# Patient Record
Sex: FEMALE | Race: WHITE | NOT HISPANIC OR LATINO | Employment: OTHER | URBAN - METROPOLITAN AREA
[De-identification: names, ages, dates, MRNs, and addresses within clinical notes are randomized per-mention and may not be internally consistent; named-entity substitution may affect disease eponyms.]

---

## 2023-07-10 LAB
ALANINE AMINOTRANSFERASE (SGPT) (U/L) IN SER/PLAS: 13 U/L (ref 7–45)
ALBUMIN (G/DL) IN SER/PLAS: 4.4 G/DL (ref 3.4–5)
ALKALINE PHOSPHATASE (U/L) IN SER/PLAS: 65 U/L (ref 33–136)
ANION GAP IN SER/PLAS: 12 MMOL/L (ref 10–20)
ASPARTATE AMINOTRANSFERASE (SGOT) (U/L) IN SER/PLAS: 20 U/L (ref 9–39)
BILIRUBIN TOTAL (MG/DL) IN SER/PLAS: 0.7 MG/DL (ref 0–1.2)
CALCIUM (MG/DL) IN SER/PLAS: 9.2 MG/DL (ref 8.6–10.6)
CARBON DIOXIDE, TOTAL (MMOL/L) IN SER/PLAS: 28 MMOL/L (ref 21–32)
CHLORIDE (MMOL/L) IN SER/PLAS: 102 MMOL/L (ref 98–107)
CHOLESTEROL (MG/DL) IN SER/PLAS: 172 MG/DL (ref 0–199)
CHOLESTEROL IN HDL (MG/DL) IN SER/PLAS: 56.5 MG/DL
CHOLESTEROL/HDL RATIO: 3
CREATININE (MG/DL) IN SER/PLAS: 0.6 MG/DL (ref 0.5–1.05)
GFR FEMALE: 89 ML/MIN/1.73M2
GLUCOSE (MG/DL) IN SER/PLAS: 84 MG/DL (ref 74–99)
LDL: 79 MG/DL (ref 0–99)
POTASSIUM (MMOL/L) IN SER/PLAS: 4.7 MMOL/L (ref 3.5–5.3)
PROTEIN TOTAL: 7.2 G/DL (ref 6.4–8.2)
SODIUM (MMOL/L) IN SER/PLAS: 137 MMOL/L (ref 136–145)
THYROTROPIN (MIU/L) IN SER/PLAS BY DETECTION LIMIT <= 0.05 MIU/L: 1.32 MIU/L (ref 0.44–3.98)
THYROXINE (T4) FREE (NG/DL) IN SER/PLAS: 1.36 NG/DL (ref 0.78–1.48)
TRIGLYCERIDE (MG/DL) IN SER/PLAS: 184 MG/DL (ref 0–149)
TRIIODOTHYRONINE (T3) FREE (PG/ML) IN SER/PLAS: 3.2 PG/ML (ref 2.3–4.2)
UREA NITROGEN (MG/DL) IN SER/PLAS: 16 MG/DL (ref 6–23)
VLDL: 37 MG/DL (ref 0–40)

## 2024-01-15 ENCOUNTER — LAB (OUTPATIENT)
Dept: LAB | Facility: LAB | Age: 84
End: 2024-01-15
Payer: MEDICARE

## 2024-01-15 ENCOUNTER — OFFICE VISIT (OUTPATIENT)
Dept: PRIMARY CARE | Facility: CLINIC | Age: 84
End: 2024-01-15
Payer: MEDICARE

## 2024-01-15 ENCOUNTER — TELEPHONE (OUTPATIENT)
Dept: PRIMARY CARE | Facility: CLINIC | Age: 84
End: 2024-01-15

## 2024-01-15 VITALS
DIASTOLIC BLOOD PRESSURE: 84 MMHG | WEIGHT: 163 LBS | SYSTOLIC BLOOD PRESSURE: 128 MMHG | HEART RATE: 84 BPM | BODY MASS INDEX: 26.51 KG/M2 | OXYGEN SATURATION: 94 %

## 2024-01-15 DIAGNOSIS — Z00.00 HEALTHCARE MAINTENANCE: ICD-10-CM

## 2024-01-15 DIAGNOSIS — Z76.89 ENCOUNTER TO ESTABLISH CARE WITH NEW DOCTOR: ICD-10-CM

## 2024-01-15 DIAGNOSIS — E03.9 HYPOTHYROIDISM, UNSPECIFIED TYPE: ICD-10-CM

## 2024-01-15 DIAGNOSIS — E78.2 MIXED HYPERLIPIDEMIA: ICD-10-CM

## 2024-01-15 DIAGNOSIS — N32.81 OVERACTIVE BLADDER: ICD-10-CM

## 2024-01-15 DIAGNOSIS — E78.2 MIXED HYPERLIPIDEMIA: Primary | ICD-10-CM

## 2024-01-15 DIAGNOSIS — J01.90 ACUTE NON-RECURRENT SINUSITIS, UNSPECIFIED LOCATION: Primary | ICD-10-CM

## 2024-01-15 PROBLEM — R10.31 RIGHT LOWER QUADRANT PAIN: Status: ACTIVE | Noted: 2024-01-15

## 2024-01-15 PROBLEM — M25.551 RIGHT HIP PAIN: Status: ACTIVE | Noted: 2020-06-11

## 2024-01-15 PROBLEM — Z96.649 HISTORY OF HIP REPLACEMENT, TOTAL: Status: ACTIVE | Noted: 2024-01-15

## 2024-01-15 PROBLEM — R79.0 LOW MAGNESIUM LEVEL: Status: ACTIVE | Noted: 2020-05-06

## 2024-01-15 PROBLEM — E87.1 LOW SODIUM LEVELS: Status: ACTIVE | Noted: 2019-12-23

## 2024-01-15 PROBLEM — E53.8 VITAMIN B 12 DEFICIENCY: Status: ACTIVE | Noted: 2020-05-06

## 2024-01-15 PROBLEM — E55.9 VITAMIN D DEFICIENCY: Status: ACTIVE | Noted: 2020-05-06

## 2024-01-15 PROBLEM — B02.23 SHINGLES (HERPES ZOSTER) POLYNEUROPATHY: Status: ACTIVE | Noted: 2019-12-06

## 2024-01-15 LAB
CHOLEST SERPL-MCNC: 159 MG/DL (ref 0–199)
CHOLESTEROL/HDL RATIO: 3.5
HDLC SERPL-MCNC: 45.9 MG/DL
LDLC SERPL CALC-MCNC: 82 MG/DL
NON HDL CHOLESTEROL: 113 MG/DL (ref 0–149)
T3FREE SERPL-MCNC: 2.9 PG/ML (ref 2.3–4.2)
T4 FREE SERPL-MCNC: 1.49 NG/DL (ref 0.78–1.48)
TRIGL SERPL-MCNC: 158 MG/DL (ref 0–149)
TSH SERPL-ACNC: 3.58 MIU/L (ref 0.44–3.98)
VLDL: 32 MG/DL (ref 0–40)

## 2024-01-15 PROCEDURE — 84439 ASSAY OF FREE THYROXINE: CPT

## 2024-01-15 PROCEDURE — 84481 FREE ASSAY (FT-3): CPT

## 2024-01-15 PROCEDURE — 1159F MED LIST DOCD IN RCRD: CPT | Performed by: STUDENT IN AN ORGANIZED HEALTH CARE EDUCATION/TRAINING PROGRAM

## 2024-01-15 PROCEDURE — 84443 ASSAY THYROID STIM HORMONE: CPT

## 2024-01-15 PROCEDURE — 1036F TOBACCO NON-USER: CPT | Performed by: STUDENT IN AN ORGANIZED HEALTH CARE EDUCATION/TRAINING PROGRAM

## 2024-01-15 PROCEDURE — 99213 OFFICE O/P EST LOW 20 MIN: CPT | Performed by: STUDENT IN AN ORGANIZED HEALTH CARE EDUCATION/TRAINING PROGRAM

## 2024-01-15 PROCEDURE — 36415 COLL VENOUS BLD VENIPUNCTURE: CPT

## 2024-01-15 PROCEDURE — 80061 LIPID PANEL: CPT

## 2024-01-15 PROCEDURE — 99387 INIT PM E/M NEW PAT 65+ YRS: CPT | Performed by: STUDENT IN AN ORGANIZED HEALTH CARE EDUCATION/TRAINING PROGRAM

## 2024-01-15 PROCEDURE — 1126F AMNT PAIN NOTED NONE PRSNT: CPT | Performed by: STUDENT IN AN ORGANIZED HEALTH CARE EDUCATION/TRAINING PROGRAM

## 2024-01-15 RX ORDER — SIMVASTATIN 40 MG/1
40 TABLET, FILM COATED ORAL NIGHTLY
COMMUNITY
End: 2024-01-15 | Stop reason: SDUPTHER

## 2024-01-15 RX ORDER — SIMVASTATIN 40 MG/1
40 TABLET, FILM COATED ORAL NIGHTLY
Qty: 90 TABLET | Refills: 1 | Status: SHIPPED | OUTPATIENT
Start: 2024-01-15

## 2024-01-15 RX ORDER — LEVOTHYROXINE SODIUM 100 UG/1
100 TABLET ORAL
Qty: 90 TABLET | Refills: 1 | Status: SHIPPED | OUTPATIENT
Start: 2024-01-15

## 2024-01-15 RX ORDER — NITROGLYCERIN 20 MG/G
0.5 OINTMENT TOPICAL
COMMUNITY
Start: 2023-01-26 | End: 2024-01-15 | Stop reason: WASHOUT

## 2024-01-15 RX ORDER — DOXYCYCLINE 100 MG/1
100 CAPSULE ORAL 2 TIMES DAILY
Qty: 10 CAPSULE | Refills: 0 | Status: SHIPPED | OUTPATIENT
Start: 2024-01-15 | End: 2024-01-20

## 2024-01-15 RX ORDER — DESMOPRESSIN ACETATE 0.2 MG/1
0.2 TABLET ORAL DAILY
COMMUNITY

## 2024-01-15 RX ORDER — LEVOTHYROXINE SODIUM 100 UG/1
100 TABLET ORAL
COMMUNITY
End: 2024-01-15 | Stop reason: SDUPTHER

## 2024-01-15 RX ORDER — MELOXICAM 15 MG/1
15 TABLET ORAL DAILY
COMMUNITY
Start: 2024-01-02 | End: 2024-01-15 | Stop reason: WASHOUT

## 2024-01-15 RX ORDER — LATANOPROST 50 UG/ML
1 SOLUTION/ DROPS OPHTHALMIC DAILY
COMMUNITY
End: 2024-01-15 | Stop reason: WASHOUT

## 2024-01-15 ASSESSMENT — PAIN SCALES - GENERAL: PAINLEVEL: 0-NO PAIN

## 2024-01-15 ASSESSMENT — PATIENT HEALTH QUESTIONNAIRE - PHQ9
2. FEELING DOWN, DEPRESSED OR HOPELESS: NOT AT ALL
SUM OF ALL RESPONSES TO PHQ9 QUESTIONS 1 AND 2: 0
1. LITTLE INTEREST OR PLEASURE IN DOING THINGS: NOT AT ALL

## 2024-01-15 NOTE — PROGRESS NOTES
Subjective   Patient ID: Keisha Baxter is a 83 y.o. female who presents for Medicare Annual Wellness Visit Subsequent (Cough and congestion in nose and chest. ).    HPI  84 yo female here to establish care and for annual wellness and URI  Est care  2. Hypothyrroidism  On levothyroxine 100 mcg  3. HLD  On simvastatin 40 mg   4. Overactive bladder  Sees Dr Bergeron  On desmopressin .2mg daily  5. Sinusitis  Headache  Neg covid  Cough, nasal congestion  Symptoms for 11 days  6. HM  No longer gets Mammogram  No longer gets Pap smear  No longer getting Colonoscopy  Immunizations: UTD on pneumonia vaccine, shingles vaccine, covid  Well balanced diet  Exercises regularly  Sees dentist regularly  No hearing or vision changes  No tobacco use currently, 50 years of use 20 years ago  Occasional alcohol use    Review of Systems  All pertinent positive symptoms are included in the history of present illness.    All other systems have been reviewed and are negative and noncontributory to this patient's current ailments.     Allergies   Allergen Reactions    Nickel Itching and Rash     rash    Penicillins Hives and Unknown     hives    Shellfish Derived Hives        Immunization History   Administered Date(s) Administered    Pfizer Purple Cap SARS-CoV-2 01/29/2021, 02/27/2021, 10/05/2021       Objective   Vitals:    01/15/24 0951   BP: 128/84   BP Location: Right arm   Patient Position: Sitting   Pulse: 84   SpO2: 94%   Weight: 73.9 kg (163 lb)       Physical Exam  CONSTITUTIONAL - well nourished, well developed, looks like stated age, in no acute distress  SKIN - normal skin color and pigmentation  HEAD - no trauma, normocephalic  EYES - extraocular muscles are intact  ENT - atraumatic  NECK - no neck mass was observed  LUNGS - CTA B/L  CARDIAC - regular rate and regular rhythm  ABDOMEN - no organomegaly, soft, nontender, nondistended  EXTREMITIES - no edema, no deformities  MSK - moves limbs equally  NEUROLOGICAL - alert,  oriented and no focal signs  PSYCHIATRIC - alert, pleasant and cordial, age-appropriate    Assessment/Plan   84 yo female here to establish care and for annual wellness and URI  Est care  PMHx reviewed  2. Hypothryroidism  Continue levothyroxine 100 mcg  3. HLD  Continue simvastatin 40 mg   4. Overactive bladder  Continue seeing Dr Bergeron  Continue desmopressin .2mg daily  5. Sinusitis  Start doxycycline  6. HM  No longer gets Mammogram  No longer gets Pap smear  No longer getting Colonoscopy  Immunizations: UTD on pneumonia vaccine, shingles vaccine, covid  Lab work ordered    Follow up in 6 months

## 2024-07-15 ENCOUNTER — OFFICE VISIT (OUTPATIENT)
Dept: PRIMARY CARE | Facility: CLINIC | Age: 84
End: 2024-07-15
Payer: MEDICARE

## 2024-07-15 ENCOUNTER — LAB (OUTPATIENT)
Dept: LAB | Facility: LAB | Age: 84
End: 2024-07-15
Payer: MEDICARE

## 2024-07-15 VITALS — HEART RATE: 87 BPM | OXYGEN SATURATION: 98 % | SYSTOLIC BLOOD PRESSURE: 150 MMHG | DIASTOLIC BLOOD PRESSURE: 80 MMHG

## 2024-07-15 DIAGNOSIS — E78.2 MIXED HYPERLIPIDEMIA: Primary | ICD-10-CM

## 2024-07-15 DIAGNOSIS — E03.9 HYPOTHYROIDISM, UNSPECIFIED TYPE: ICD-10-CM

## 2024-07-15 DIAGNOSIS — E78.2 MIXED HYPERLIPIDEMIA: ICD-10-CM

## 2024-07-15 LAB
ALBUMIN SERPL BCP-MCNC: 4.6 G/DL (ref 3.4–5)
ALP SERPL-CCNC: 68 U/L (ref 33–136)
ALT SERPL W P-5'-P-CCNC: 16 U/L (ref 7–45)
ANION GAP SERPL CALC-SCNC: 12 MMOL/L (ref 10–20)
AST SERPL W P-5'-P-CCNC: 17 U/L (ref 9–39)
BILIRUB SERPL-MCNC: 0.8 MG/DL (ref 0–1.2)
BUN SERPL-MCNC: 15 MG/DL (ref 6–23)
CALCIUM SERPL-MCNC: 9.8 MG/DL (ref 8.6–10.6)
CHLORIDE SERPL-SCNC: 100 MMOL/L (ref 98–107)
CHOLEST SERPL-MCNC: 180 MG/DL (ref 0–199)
CHOLESTEROL/HDL RATIO: 3.2
CO2 SERPL-SCNC: 28 MMOL/L (ref 21–32)
CREAT SERPL-MCNC: 0.64 MG/DL (ref 0.5–1.05)
EGFRCR SERPLBLD CKD-EPI 2021: 87 ML/MIN/1.73M*2
GLUCOSE SERPL-MCNC: 88 MG/DL (ref 74–99)
HDLC SERPL-MCNC: 55.5 MG/DL
LDLC SERPL CALC-MCNC: 94 MG/DL
NON HDL CHOLESTEROL: 125 MG/DL (ref 0–149)
POTASSIUM SERPL-SCNC: 4.4 MMOL/L (ref 3.5–5.3)
PROT SERPL-MCNC: 7.3 G/DL (ref 6.4–8.2)
SODIUM SERPL-SCNC: 136 MMOL/L (ref 136–145)
TRIGL SERPL-MCNC: 154 MG/DL (ref 0–149)
TSH SERPL-ACNC: 2.74 MIU/L (ref 0.44–3.98)
VLDL: 31 MG/DL (ref 0–40)

## 2024-07-15 PROCEDURE — 99214 OFFICE O/P EST MOD 30 MIN: CPT | Performed by: STUDENT IN AN ORGANIZED HEALTH CARE EDUCATION/TRAINING PROGRAM

## 2024-07-15 PROCEDURE — 80061 LIPID PANEL: CPT

## 2024-07-15 PROCEDURE — 84443 ASSAY THYROID STIM HORMONE: CPT

## 2024-07-15 PROCEDURE — 1157F ADVNC CARE PLAN IN RCRD: CPT | Performed by: STUDENT IN AN ORGANIZED HEALTH CARE EDUCATION/TRAINING PROGRAM

## 2024-07-15 PROCEDURE — 1159F MED LIST DOCD IN RCRD: CPT | Performed by: STUDENT IN AN ORGANIZED HEALTH CARE EDUCATION/TRAINING PROGRAM

## 2024-07-15 PROCEDURE — 36415 COLL VENOUS BLD VENIPUNCTURE: CPT

## 2024-07-15 PROCEDURE — 1158F ADVNC CARE PLAN TLK DOCD: CPT | Performed by: STUDENT IN AN ORGANIZED HEALTH CARE EDUCATION/TRAINING PROGRAM

## 2024-07-15 PROCEDURE — 1123F ACP DISCUSS/DSCN MKR DOCD: CPT | Performed by: STUDENT IN AN ORGANIZED HEALTH CARE EDUCATION/TRAINING PROGRAM

## 2024-07-15 PROCEDURE — 1125F AMNT PAIN NOTED PAIN PRSNT: CPT | Performed by: STUDENT IN AN ORGANIZED HEALTH CARE EDUCATION/TRAINING PROGRAM

## 2024-07-15 PROCEDURE — 80053 COMPREHEN METABOLIC PANEL: CPT

## 2024-07-15 PROCEDURE — 1036F TOBACCO NON-USER: CPT | Performed by: STUDENT IN AN ORGANIZED HEALTH CARE EDUCATION/TRAINING PROGRAM

## 2024-07-15 RX ORDER — MELOXICAM 15 MG/1
1 TABLET ORAL
COMMUNITY
Start: 2024-06-12

## 2024-07-15 RX ORDER — ACETAMINOPHEN, DIPHENHYDRAMINE HCL, PHENYLEPHRINE HCL 325; 25; 5 MG/1; MG/1; MG/1
TABLET ORAL
COMMUNITY

## 2024-07-15 RX ORDER — ACETAMINOPHEN 500 MG
5000 TABLET ORAL
COMMUNITY

## 2024-07-15 ASSESSMENT — PAIN SCALES - GENERAL: PAINLEVEL: 10-WORST PAIN EVER

## 2024-07-15 ASSESSMENT — PATIENT HEALTH QUESTIONNAIRE - PHQ9
1. LITTLE INTEREST OR PLEASURE IN DOING THINGS: NOT AT ALL
SUM OF ALL RESPONSES TO PHQ9 QUESTIONS 1 AND 2: 0
2. FEELING DOWN, DEPRESSED OR HOPELESS: NOT AT ALL

## 2024-07-15 NOTE — PROGRESS NOTES
Subjective   Patient ID: Keisha Baxter is a 84 y.o. female who presents for Follow-up (6 months).    HPI  83 yo female here for 6 month follow up  Hypothyroidism  On levothyroxine 100 mcg  2. HLD  On simvastatin 40 mg   3. Overactive bladder  Seeing Dr Davidson  On desmopressin .2mg daily  4. R knee osteoporosis  Seeing ortho  Uses meloxicam prn    Review of Systems  All pertinent positive symptoms are included in the history of present illness.    All other systems have been reviewed and are negative and noncontributory to this patient's current ailments.     Allergies   Allergen Reactions    Nickel Itching and Rash     rash    Penicillins Hives and Unknown     hives    Shellfish Derived Hives        Immunization History   Administered Date(s) Administered    Pfizer Purple Cap SARS-CoV-2 01/29/2021, 02/27/2021, 10/05/2021       Objective   Vitals:    07/15/24 1024 07/15/24 1230   BP: (!) 148/94 150/80   Pulse: 87    SpO2: 98%        Physical Exam  CONSTITUTIONAL - well nourished, well developed, looks like stated age, in no acute distress  SKIN - normal skin color and pigmentation. No rash, lesions, or nodules visualized  HEAD - no trauma, normocephalic  EYES - extraocular muscles are intact  ENT - atraumatic  NECK - no neck mass was observed  LUNGS - CTA B/L  CARDIAC - regular rate and regular rhythm  ABDOMEN - no organomegaly, soft, nontender, nondistended  EXTREMITIES - no edema, no deformities  MSK - moves limbs equally  NEUROLOGICAL - alert, oriented and no focal signs  PSYCHIATRIC - alert, pleasant and cordial, age-appropriate  IMMUNOLOGIC - no cervical lymphadenopathy     Assessment/Plan   83 yo female here for 6 month follow up  Hypothyroidism  Continue levothyroxine 100 mcg  2. HLD  Continue simvastatin 40 mg   3. Overactive bladder  See Dr Davidson  Continue desmopressin .2mg daily  4. R knee osteoporosis  See ortho  Meloxicam prn    RTC in 6 months

## 2024-07-16 ENCOUNTER — TELEPHONE (OUTPATIENT)
Dept: PRIMARY CARE | Facility: CLINIC | Age: 84
End: 2024-07-16
Payer: MEDICARE

## 2024-07-16 DIAGNOSIS — E03.9 HYPOTHYROIDISM, UNSPECIFIED TYPE: ICD-10-CM

## 2024-07-16 DIAGNOSIS — E78.2 MIXED HYPERLIPIDEMIA: ICD-10-CM

## 2024-07-16 RX ORDER — SIMVASTATIN 40 MG/1
40 TABLET, FILM COATED ORAL NIGHTLY
Qty: 90 TABLET | Refills: 1 | Status: SHIPPED | OUTPATIENT
Start: 2024-07-16

## 2024-07-16 RX ORDER — LEVOTHYROXINE SODIUM 100 UG/1
100 TABLET ORAL
Qty: 90 TABLET | Refills: 1 | Status: SHIPPED | OUTPATIENT
Start: 2024-07-16

## 2024-10-01 ENCOUNTER — LAB (OUTPATIENT)
Dept: LAB | Facility: LAB | Age: 84
End: 2024-10-01
Payer: MEDICARE

## 2024-10-01 ENCOUNTER — PRE-ADMISSION TESTING (OUTPATIENT)
Dept: PREADMISSION TESTING | Facility: HOSPITAL | Age: 84
End: 2024-10-01
Payer: MEDICARE

## 2024-10-01 VITALS
HEIGHT: 66 IN | RESPIRATION RATE: 16 BRPM | HEART RATE: 90 BPM | WEIGHT: 162.6 LBS | SYSTOLIC BLOOD PRESSURE: 162 MMHG | BODY MASS INDEX: 26.13 KG/M2 | OXYGEN SATURATION: 98 % | TEMPERATURE: 98.6 F | DIASTOLIC BLOOD PRESSURE: 110 MMHG

## 2024-10-01 DIAGNOSIS — Z01.818 PRE-OP EXAMINATION: Primary | ICD-10-CM

## 2024-10-01 DIAGNOSIS — Z01.818 PRE-OP EXAMINATION: ICD-10-CM

## 2024-10-01 LAB
ATRIAL RATE: 79 BPM
BASOPHILS # BLD AUTO: 0.02 X10*3/UL (ref 0–0.1)
BASOPHILS NFR BLD AUTO: 0.4 %
EOSINOPHIL # BLD AUTO: 0.06 X10*3/UL (ref 0–0.4)
EOSINOPHIL NFR BLD AUTO: 1.1 %
ERYTHROCYTE [DISTWIDTH] IN BLOOD BY AUTOMATED COUNT: 12.5 % (ref 11.5–14.5)
HCT VFR BLD AUTO: 46.4 % (ref 36–46)
HGB BLD-MCNC: 15.2 G/DL (ref 12–16)
IMM GRANULOCYTES # BLD AUTO: 0.01 X10*3/UL (ref 0–0.5)
IMM GRANULOCYTES NFR BLD AUTO: 0.2 % (ref 0–0.9)
LYMPHOCYTES # BLD AUTO: 1.58 X10*3/UL (ref 0.8–3)
LYMPHOCYTES NFR BLD AUTO: 28.8 %
MCH RBC QN AUTO: 30.7 PG (ref 26–34)
MCHC RBC AUTO-ENTMCNC: 32.8 G/DL (ref 32–36)
MCV RBC AUTO: 94 FL (ref 80–100)
MONOCYTES # BLD AUTO: 0.79 X10*3/UL (ref 0.05–0.8)
MONOCYTES NFR BLD AUTO: 14.4 %
NEUTROPHILS # BLD AUTO: 3.03 X10*3/UL (ref 1.6–5.5)
NEUTROPHILS NFR BLD AUTO: 55.1 %
NRBC BLD-RTO: 0 /100 WBCS (ref 0–0)
P AXIS: 56 DEGREES
P OFFSET: 194 MS
P ONSET: 135 MS
PLATELET # BLD AUTO: 153 X10*3/UL (ref 150–450)
PR INTERVAL: 184 MS
Q ONSET: 227 MS
QRS COUNT: 13 BEATS
QRS DURATION: 114 MS
QT INTERVAL: 416 MS
QTC CALCULATION(BAZETT): 477 MS
QTC FREDERICIA: 456 MS
R AXIS: -43 DEGREES
RBC # BLD AUTO: 4.95 X10*6/UL (ref 4–5.2)
T AXIS: 75 DEGREES
T OFFSET: 435 MS
VENTRICULAR RATE: 79 BPM
WBC # BLD AUTO: 5.5 X10*3/UL (ref 4.4–11.3)

## 2024-10-01 PROCEDURE — 99203 OFFICE O/P NEW LOW 30 MIN: CPT

## 2024-10-01 PROCEDURE — 93010 ELECTROCARDIOGRAM REPORT: CPT | Performed by: INTERNAL MEDICINE

## 2024-10-01 PROCEDURE — 93005 ELECTROCARDIOGRAM TRACING: CPT

## 2024-10-01 PROCEDURE — 87081 CULTURE SCREEN ONLY: CPT | Mod: WESLAB

## 2024-10-01 PROCEDURE — 36415 COLL VENOUS BLD VENIPUNCTURE: CPT

## 2024-10-01 PROCEDURE — 85025 COMPLETE CBC W/AUTO DIFF WBC: CPT

## 2024-10-01 RX ORDER — CHLORHEXIDINE GLUCONATE ORAL RINSE 1.2 MG/ML
SOLUTION DENTAL
Qty: 473 ML | Refills: 0 | Status: SHIPPED | OUTPATIENT
Start: 2024-10-01 | End: 2024-10-04 | Stop reason: HOSPADM

## 2024-10-01 ASSESSMENT — DUKE ACTIVITY SCORE INDEX (DASI)
CAN YOU HAVE SEXUAL RELATIONS: YES
CAN YOU WALK A BLOCK OR TWO ON LEVEL GROUND: NO
CAN YOU WALK INDOORS, SUCH AS AROUND YOUR HOUSE: YES
CAN YOU DO MODERATE WORK AROUND THE HOUSE LIKE VACUUMING, SWEEPING FLOORS OR CARRYING GROCERIES: NO
CAN YOU PARTICIPATE IN STRENOUS SPORTS LIKE SWIMMING, SINGLES TENNIS, FOOTBALL, BASKETBALL, OR SKIING: NO
CAN YOU DO HEAVY WORK AROUND THE HOUSE LIKE SCRUBBING FLOORS OR LIFTING AND MOVING HEAVY FURNITURE: YES
CAN YOU CLIMB A FLIGHT OF STAIRS OR WALK UP A HILL: YES
CAN YOU DO LIGHT WORK AROUND THE HOUSE LIKE DUSTING OR WASHING DISHES: NO
CAN YOU DO YARD WORK LIKE RAKING LEAVES, WEEDING OR PUSHING A MOWER: YES
CAN YOU PARTICIPATE IN MODERATE RECREATIONAL ACTIVITIES LIKE GOLF, BOWLING, DANCING, DOUBLES TENNIS OR THROWING A BASEBALL OR FOOTBALL: YES
DASI METS SCORE: 6.9
TOTAL_SCORE: 33.75
CAN YOU RUN A SHORT DISTANCE: NO
CAN YOU TAKE CARE OF YOURSELF (EAT, DRESS, BATHE, OR USE TOILET): YES

## 2024-10-01 ASSESSMENT — ENCOUNTER SYMPTOMS
GASTROINTESTINAL NEGATIVE: 1
CONSTITUTIONAL NEGATIVE: 1
CARDIOVASCULAR NEGATIVE: 1
RESPIRATORY NEGATIVE: 1
HEMATOLOGIC/LYMPHATIC NEGATIVE: 1
EYES NEGATIVE: 1
PSYCHIATRIC NEGATIVE: 1
ENDOCRINE NEGATIVE: 1
ALLERGIC/IMMUNOLOGIC NEGATIVE: 1

## 2024-10-01 ASSESSMENT — PAIN - FUNCTIONAL ASSESSMENT: PAIN_FUNCTIONAL_ASSESSMENT: 0-10

## 2024-10-01 ASSESSMENT — PAIN SCALES - GENERAL: PAINLEVEL_OUTOF10: 0 - NO PAIN

## 2024-10-01 NOTE — H&P (VIEW-ONLY)
CPM/PAT Evaluation       Name: Keisha Baxter (Keisha Baxter)  /Age: 1940/84 y.o.     In-Person       Chief Complaint: Right knee pain    HPI: Keisha Baxter is a 84 year old female with history of hypothyroid and hyperlipidemia. She states she has been having right knee pain since after her bilateral hip replacements in 2019. She denies numbness and tingling. She states she can't walk long distances without having pain. She mentioned going from sitting standing is painful . She denies instability problems with knee. She states she is very careful when moving around. She denies fever, chills ,nausea, vomiting, chest pain, sob, dizziness, and palpitations. She is scheduled for a total right knee replacement.     Past Medical History:   Diagnosis Date    Arthritis     Disease of thyroid gland     Hyperlipidemia        Past Surgical History:   Procedure Laterality Date    TOTAL HIP ARTHROPLASTY Bilateral 2019       Social History     Tobacco Use    Smoking status: Former     Current packs/day: 0.00     Average packs/day: 1 pack/day for 48.0 years (48.0 ttl pk-yrs)     Types: Cigarettes     Start date:      Quit date:      Years since quittin.7    Smokeless tobacco: Never   Substance Use Topics    Alcohol use: Yes     Alcohol/week: 3.0 standard drinks of alcohol     Types: 3 Glasses of wine per week     Comment: 3 glasses of wine per week     Social History     Substance and Sexual Activity   Drug Use Never         No family history on file.    Allergies   Allergen Reactions    Penicillins Hives and Unknown     hives    Shellfish Derived Hives     Current Outpatient Medications   Medication Sig Dispense Refill    cholecalciferol (Vitamin D-3) 5,000 Units tablet Take 1 tablet (5,000 Units) by mouth once daily.      cyanocobalamin, vitamin B-12, 5,000 mcg tablet, sublingual Place under the tongue.      desmopressin (DDAVP) 0.2 mg tablet Take 1 tablet (0.2 mg) by mouth once daily.       levothyroxine (Synthroid, Levoxyl) 100 mcg tablet Take 1 tablet (100 mcg) by mouth once daily in the morning. Take before meals. 90 tablet 1    meloxicam (Mobic) 15 mg tablet Take 1 tablet (15 mg) by mouth early in the morning..      simvastatin (Zocor) 40 mg tablet Take 1 tablet (40 mg) by mouth once daily at bedtime. 90 tablet 1    chlorhexidine (Peridex) 0.12 % solution Use as directed 473 mL 0     No current facility-administered medications for this visit.       Review of Systems   Constitutional: Negative.    HENT: Negative.     Eyes: Negative.    Respiratory: Negative.     Cardiovascular: Negative.    Gastrointestinal: Negative.    Endocrine: Negative.    Genitourinary: Negative.    Musculoskeletal:  Positive for gait problem.        Right knee pain     Skin: Negative.    Allergic/Immunologic: Negative.    Hematological: Negative.    Psychiatric/Behavioral: Negative.                Physical Exam  Vitals reviewed.   Constitutional:       Appearance: Normal appearance.   HENT:      Head: Normocephalic and atraumatic.      Nose: Nose normal.      Mouth/Throat:      Mouth: Mucous membranes are moist.      Pharynx: Oropharynx is clear.   Eyes:      Extraocular Movements: Extraocular movements intact.      Conjunctiva/sclera: Conjunctivae normal.   Cardiovascular:      Rate and Rhythm: Normal rate and regular rhythm.      Pulses: Normal pulses.      Heart sounds: Normal heart sounds.   Pulmonary:      Effort: Pulmonary effort is normal.      Breath sounds: Normal breath sounds.   Abdominal:      General: Bowel sounds are normal.      Palpations: Abdomen is soft.   Genitourinary:     Comments: Assessment referred to physician  Musculoskeletal:         General: Normal range of motion.      Cervical back: Normal range of motion and neck supple.   Skin:     General: Skin is warm and dry.   Neurological:      General: No focal deficit present.      Mental Status: She is alert and oriented to person, place, and time.  "  Psychiatric:         Mood and Affect: Mood normal.         Behavior: Behavior normal.         Thought Content: Thought content normal.         Judgment: Judgment normal.          PAT AIRWAY:   Airway:     Mallampati::  II    TM distance::  >3 FB    Neck ROM::  Full  normal      BP (!) 162/110 Comment: maunual  Pulse 90   Temp 37 °C (98.6 °F) (Temporal)   Resp 16   Ht 1.676 m (5' 6\")   Wt 73.8 kg (162 lb 9.6 oz)   SpO2 98%   BMI 26.24 kg/m²     ASA:II  EDWIN:2.8%  RCRI:0.4%      DASI Risk Score      Flowsheet Row Pre-Admission Testing from 10/1/2024 in [facility]   Can you take care of yourself (eat, dress, bathe, or use toilet)?  2.75 filed at 10/01/2024 0918   Can you walk indoors, such as around your house? 1.75 filed at 10/01/2024 0918   Can you walk a block or two on level ground?  0 filed at 10/01/2024 0918   Can you climb a flight of stairs or walk up a hill? 5.5 filed at 10/01/2024 0918   Can you run a short distance? 0 filed at 10/01/2024 0918   Can you do light work around the house like dusting or washing dishes? 0 filed at 10/01/2024 0918   Can you do moderate work around the house like vacuuming, sweeping floors or carrying groceries? 0 filed at 10/01/2024 0918   Can you do heavy work around the house like scrubbing floors or lifting and moving heavy furniture?  8 filed at 10/01/2024 0918   Can you do yard work like raking leaves, weeding or pushing a mower? 4.5 filed at 10/01/2024 0918   Can you have sexual relations? 5.25 filed at 10/01/2024 0918   Can you participate in moderate recreational activities like golf, bowling, dancing, doubles tennis or throwing a baseball or football? 6 filed at 10/01/2024 0918   Can you participate in strenous sports like swimming, singles tennis, football, basketball, or skiing? 0 filed at 10/01/2024 0918   DASI SCORE 33.75 filed at 10/01/2024 0918   METS Score (Will be calculated only when all the questions are answered) 6.9 filed at 10/01/2024 " 0918          Caprini DVT Assessment    No data to display       Modified Frailty Index    No data to display       CHADS2 Stroke Risk         N/A 3 to 100%: High Risk   2 to < 3%: Medium Risk   0 to < 2%: Low Risk     Last Change: N/A          This score determines the patient's risk of having a stroke if the patient has atrial fibrillation.        This score is not applicable to this patient. Components are not calculated.          Revised Cardiac Risk Index      Flowsheet Row Pre-Admission Testing from 10/1/2024 in [facility]   High-Risk Surgery (Intraperitoneal, Intrathoracic,Suprainguinal vascular) 0 filed at 10/01/2024 0956   History of ischemic heart disease (History of MI, History of positive exercuse test, Current chest paint considered due to myocardial ischemia, Use of nitrate therapy, ECG with pathological Q Waves) 0 filed at 10/01/2024 0956   History of congestive heart failure (pulmonary edemia, bilateral rales or S3 gallop, Paroxysmal nocturnal dyspnea, CXR showing pulmonary vascular redistribution) 0 filed at 10/01/2024 0956   History of cerebrovascular disease (Prior TIA or stroke) 0 filed at 10/01/2024 0956   Pre-operative insulin treatment 0 filed at 10/01/2024 0956   Pre-operative creatinine>2 mg/dl 0 filed at 10/01/2024 0956   Revised Cardiac Risk Calculator 0 filed at 10/01/2024 0956          Apfel Simplified Score    No data to display       Risk Analysis Index Results This Encounter    No data found in the last 10 encounters.       Stop Bang Score      Flowsheet Row Pre-Admission Testing from 10/1/2024 in [facility]   Do you snore loudly? 1 filed at 10/01/2024 0919   Do you often feel tired or fatigued after your sleep? 0 filed at 10/01/2024 0919   Has anyone ever observed you stop breathing in your sleep? 0 filed at 10/01/2024 0919   Do you have or are you being treated for high blood pressure? 0 filed at 10/01/2024 0919   Recent BMI (Calculated) 26.3 filed  at 10/01/2024 0919   Is BMI greater than 35 kg/m2? 0=No filed at 10/01/2024 0919   Age older than 50 years old? 1=Yes filed at 10/01/2024 0919   Is your neck circumference greater than 17 inches (Male) or 16 inches (Female)? 0 filed at 10/01/2024 0919   Gender - Male 0=No filed at 10/01/2024 0919   STOP-BANG Total Score 2 filed at 10/01/2024 0919            Assessment and Plan:     Primary osteoarthritis of right knee : OPEN TOTAL KNEE ARTHROPLASTY   Hypothyroid:  Levothyroxine, TSH 2.74  7/15/24  Hyperlipidemia: Simvastatin  High blood pressure without diagnosis of hypertension: initial dynamap /135. Manual /110. Patient states she doesn't have high blood pressure. Patient will see her primary for clearance on 10/7/24. Patient was instructed to let the primary know her blood pressure was high today in PAT.    [facility]  [facility] 8/28/24  HECTOR Allen      EKG read by cardiologist. EKG with similar reading can be found scanned into [facility] date 1/23/2019.    JAS Maxwell-SAUL

## 2024-10-01 NOTE — PREPROCEDURE INSTRUCTIONS
Medication List            Accurate as of October 1, 2024  9:20 AM. Always use your most recent med list.                cholecalciferol 5,000 Units tablet  Commonly known as: Vitamin D-3  Additional Medication Adjustments for Surgery: Take last dose 7 days before surgery     cyanocobalamin (vitamin B-12) 5,000 mcg tablet, sublingual  Additional Medication Adjustments for Surgery: Take last dose 7 days before surgery     desmopressin 0.2 mg tablet  Commonly known as: DDAVP  Medication Adjustments for Surgery: Take on the morning of surgery     levothyroxine 100 mcg tablet  Commonly known as: Synthroid, Levoxyl  Take 1 tablet (100 mcg) by mouth once daily in the morning. Take before meals.  Medication Adjustments for Surgery: Take on the morning of surgery     meloxicam 15 mg tablet  Commonly known as: Mobic  Additional Medication Adjustments for Surgery: Take last dose 7 days before surgery     simvastatin 40 mg tablet  Commonly known as: Zocor  Take 1 tablet (40 mg) by mouth once daily at bedtime.  Medication Adjustments for Surgery: Take/Use as prescribed                Preoperative Fasting Guidelines    Why must I stop eating and drinking near surgery time?  With sedation, food or liquid in your stomach can enter your lungs causing serious complications  Increases nausea and vomiting    When do I need to stop eating and drinking before my surgery?  Do not eat any food or drink any liquids after midnight the night before your surgery/procedure.  You may have sips of water to take medications.    PAT DISCHARGE INSTRUCTIONS    Please call the Same Day Surgery (SDS) Department of the hospital where your procedure will be performed after 2:00 PM the day before your surgery. If you are scheduled on a Monday, or a Tuesday following a Monday holiday, you will need to call on the last business day prior to your surgery.    [address]  [address]  [phone]  [facility]  [address] [facility], [id number] [phone]  [facility]  [address] [place] [address]  [phone]    Please let your surgeon know if:      You develop any open sores, shingles, burning or painful urination as these may increase your risk of an infection.   You no longer wish to have the surgery.   Any other personal circumstances change that may lead to the need to cancel or defer this surgery-such as being sick or getting admitted to any hospital within one week of your planned procedure.    Your contact details change, such as a change of address or phone number.    Starting now:     Please DO NOT drink alcohol or smoke for 24 hours before surgery. It is well known that quitting smoking can make a huge difference to your health and recovery from surgery. The longer you abstain from smoking, the better your chances of a healthy recovery. If you need help with quitting, call 1-686-QUIT-NOW to be connected to a trained counselor who will discuss the best methods to help you quit.     Before your surgery:    Please stop all supplements 7 days prior to surgery. Or as directed by your surgeon.   Please stop taking NSAID pain medicine such as Advil and Motrin 7 days before surgery.    If you develop any fever, cough, cold, rashes, cuts, scratches, scrapes, urinary symptoms or infection anywhere on your body (including teeth and gums) prior to surgery, please call your surgeon’s office as soon as possible. This may require treatment to reduce the chance of cancellation on the day of surgery.    The day before your surgery:   DIET- Please follow the diet instructions at the top of your packet.   Get a good night’s rest.  Use the special soap for bathing if you have been instructed to use one.    Scheduled surgery times may change and you will be notified if  this occurs - please check your personal voicemail for any updates.     On the morning of surgery:   Wear comfortable, loose fitting clothes which open in the front. Please do not wear moisturizers, creams, lotions, makeup or perfume.    Please bring with you to surgery:   Photo ID and insurance card   Current list of medicines and allergies   Pacemaker/ Defibrillator/Heart stent cards   CPAP machine and mask    Slings/ splints/ crutches   A copy of your complete advanced directive/DHPOA.    Please do NOT bring with you to surgery:   All jewelry and valuables should be left at home.   Prosthetic devices such as contact lenses, hearing aids, dentures, eyelash extensions, hairpins and body piercings must be removed prior to going in to the surgical suite.    After outpatient surgery:   A responsible adult MUST accompany you at the time of discharge and stay with you for 24 hours after your surgery. You may NOT drive yourself home after surgery.    Do not drive, operate machinery, make critical decisions or do activities that require co-ordination or balance until after a night’s sleep.   Do not drink alcoholic beverages for 24 hours.   Instructions for resuming your medications will be provided by your surgeon.    CALL YOUR DOCTOR AFTER SURGERY IF YOU HAVE:     Chills and/or a fever of 101° F or higher.    Redness, swelling, pus or drainage from your surgical wound or a bad smell from the wound.    Lightheadedness, fainting or confusion.    Persistent vomiting (throwing up) and are not able to eat or drink for 12 hours.    Three or more loose, watery bowel movements in 24 hours (diarrhea).   Difficulty or pain while urinating( after non-urological surgery)    Pain and swelling in your legs, especially if it is only on one side.    Difficulty breathing or are breathing faster than normal.    Any new concerning symptoms.      Patient Information: Pre-Operative Infection Prevention Measures     Why did I have my nose,  under my arms, and groin swabbed?  The purpose of the swab is to identify Staphylococcus aureus inside your nose or on your skin.  The swab was sent to the laboratory for culture.  A positive swab/culture for Staphylococcus aureus is called colonization or carriage.      What is Staphylococcus aureus?  Staphylococcus aureus, also known as “staph”, is a germ found on the skin or in the nose of healthy people.  Sometimes Staphylococcus aureus can get into the body and cause an infection.  This can be minor (such as pimples, boils, or other skin problems).  It might also be serious (such as a blood infection, pneumonia, or a surgical site infection).    What is Staphylococcus aureus colonization or carriage?  Colonization or carriage means that a person has the germ but is not sick from it.  These bacteria can be spread on the hands or when breathing or sneezing.    How is Staphylococcus aureus spread?  It is most often spread by close contact with a person or item that carries it.    What happens if my culture is positive for Staphylococcus aureus?  Your doctor/medical team will use this information to guide any antibiotic treatment which may be necessary.  Regardless of the culture results, we will clean the inside of your nose with a betadine swab just before you have your surgery.      Will I get an infection if I have Staphylococcus aureus in my nose or on my skin?  Anyone can get an infection with Staphylococcus aureus.  However, the best way to reduce your risk of infection is to follow the instructions provided to you for the use of your CHG soap and dental rinse.        Patient Information: Oral/Dental Rinse    What is oral/dental rinse?   It is a mouthwash. It is a way of cleaning the mouth with a germ-killing solution before your surgery.  The solution contains chlorhexidine, commonly known as CHG.   It is used inside the mouth to kill a bacteria known as Staphylococcus aureus.  Let your doctor know if you  are allergic to Chlorhexidine.    Why do I need to use CHG oral/dental rinse?  The CHG oral/dental rinse helps to kill a bacteria in your mouth known as Staphylococcus aureus.     This reduces the risk of infection at the surgical site.      Using your CHG oral/dental rinse  STEPS:  Use your CHG oral/dental rinse after you brush your teeth the night before (at bedtime) and the morning of your surgery.  Follow all directions on your prescription label.    Use the cap on the container to measure 15ml   Swish (gargle if you can) the mouthwash in your mouth for at least 30 seconds, (do not swallow) and spit out  After you use your CHG rinse, do not rinse your mouth with water, drink or eat.  Please refer to the prescription label for the appropriate time to resume oral intake      What side effects might I have using the CHG oral/dental rinse?  CHG rinse will stick to plaque on the teeth.  Brush and floss just before use.  Teeth brushing will help avoid staining of plaque during use.      Patient Information: Home Preoperative Antibacterial Shower      What is a home preoperative antibacterial shower?  This shower is a way of cleaning the skin with a germ-killing solution before surgery.  The solution contains chlorhexidine, commonly known as CHG.  CHG is a skin cleanser with germ-killing ability.  Let your doctor know if you are allergic to chlorhexidine.    Why do I need to take a preoperative antibacterial shower?  Skin is not sterile.  It is best to try to make your skin as free of germs as possible before surgery.  Proper cleansing with a germ-killing soap before surgery can lower the number of germs on your skin.  This helps to reduce the risk of infection at the surgical site.  Following the instructions listed below will help you prepare your skin for surgery.      How do I use the solution?  Steps:  Begin using your CHG soap 5 days before your scheduled surgery on _____start wash 10/11/24_______.    First, wash  and rinse your hair using soap. Keep CHG soap away from ear canals and eyes.  Rinse completely, do not condition.  Hair extensions should be removed.  Wash your face with your normal soap and rinse.    Apply the CHG solution to a clean wet washcloth.  Turn the water off or move away from the water spray to avoid premature rinsing of the CHG soap as you are applying.   Firmly lather your entire body from the neck down.  Do not use on your face.  Pay special attention to the area(s) where your incision(s) will be located unless they are on your face.  Avoid scrubbing your skin too hard.  The important point is to have the CHG soap sit on your skin for 3 minutes.    When the 3 minutes are up, turn on the water and rinse the CHG solution off your body completely.   DO NOT wash with regular soap after you have used the CHG soap solution  Pat yourself dry with a clean, freshly-laundered towel.  DO NOT apply powders, deodorants, or lotions.  Dress in clean, freshly laundered nightclothes.    Be sure to sleep with clean, freshly laundered sheets.  Be aware that CHG will cause stains on fabrics; if you wash them with bleach after use.  Rinse your washcloth and other linens that have contact with CHG completely.  Use only non-chlorine detergents to launder the items used.   The morning of surgery is the fifth day.  Repeat the above steps and dress in clean comfortable clothing     Whom should I contact if I have any questions regarding the use of CHG soap?  Call the [facility] at [phone] if you have any questions.

## 2024-10-01 NOTE — CPM/PAT H&P
CPM/PAT Evaluation       Name: Keisha Baxter (eKisha Baxter)  /Age: 1940/84 y.o.     In-Person       Chief Complaint: Right knee pain    HPI: Keisha Baxter is a 84 year old female with history of hypothyroid and hyperlipidemia. She states she has been having right knee pain since after her bilateral hip replacements in 2019. She denies numbness and tingling. She states she can't walk long distances without having pain. She mentioned going from sitting standing is painful . She denies instability problems with knee. She states she is very careful when moving around. She denies fever, chills ,nausea, vomiting, chest pain, sob, dizziness, and palpitations. She is scheduled for a total right knee replacement.     Past Medical History:   Diagnosis Date    Arthritis     Disease of thyroid gland     Hyperlipidemia        Past Surgical History:   Procedure Laterality Date    TOTAL HIP ARTHROPLASTY Bilateral 2019       Social History     Tobacco Use    Smoking status: Former     Current packs/day: 0.00     Average packs/day: 1 pack/day for 48.0 years (48.0 ttl pk-yrs)     Types: Cigarettes     Start date:      Quit date:      Years since quittin.7    Smokeless tobacco: Never   Substance Use Topics    Alcohol use: Yes     Alcohol/week: 3.0 standard drinks of alcohol     Types: 3 Glasses of wine per week     Comment: 3 glasses of wine per week     Social History     Substance and Sexual Activity   Drug Use Never         No family history on file.    Allergies   Allergen Reactions    Penicillins Hives and Unknown     hives    Shellfish Derived Hives     Current Outpatient Medications   Medication Sig Dispense Refill    cholecalciferol (Vitamin D-3) 5,000 Units tablet Take 1 tablet (5,000 Units) by mouth once daily.      cyanocobalamin, vitamin B-12, 5,000 mcg tablet, sublingual Place under the tongue.      desmopressin (DDAVP) 0.2 mg tablet Take 1 tablet (0.2 mg) by mouth once daily.       levothyroxine (Synthroid, Levoxyl) 100 mcg tablet Take 1 tablet (100 mcg) by mouth once daily in the morning. Take before meals. 90 tablet 1    meloxicam (Mobic) 15 mg tablet Take 1 tablet (15 mg) by mouth early in the morning..      simvastatin (Zocor) 40 mg tablet Take 1 tablet (40 mg) by mouth once daily at bedtime. 90 tablet 1    chlorhexidine (Peridex) 0.12 % solution Use as directed 473 mL 0     No current facility-administered medications for this visit.       Review of Systems   Constitutional: Negative.    HENT: Negative.     Eyes: Negative.    Respiratory: Negative.     Cardiovascular: Negative.    Gastrointestinal: Negative.    Endocrine: Negative.    Genitourinary: Negative.    Musculoskeletal:  Positive for gait problem.        Right knee pain     Skin: Negative.    Allergic/Immunologic: Negative.    Hematological: Negative.    Psychiatric/Behavioral: Negative.                Physical Exam  Vitals reviewed.   Constitutional:       Appearance: Normal appearance.   HENT:      Head: Normocephalic and atraumatic.      Nose: Nose normal.      Mouth/Throat:      Mouth: Mucous membranes are moist.      Pharynx: Oropharynx is clear.   Eyes:      Extraocular Movements: Extraocular movements intact.      Conjunctiva/sclera: Conjunctivae normal.   Cardiovascular:      Rate and Rhythm: Normal rate and regular rhythm.      Pulses: Normal pulses.      Heart sounds: Normal heart sounds.   Pulmonary:      Effort: Pulmonary effort is normal.      Breath sounds: Normal breath sounds.   Abdominal:      General: Bowel sounds are normal.      Palpations: Abdomen is soft.   Genitourinary:     Comments: Assessment referred to physician  Musculoskeletal:         General: Normal range of motion.      Cervical back: Normal range of motion and neck supple.   Skin:     General: Skin is warm and dry.   Neurological:      General: No focal deficit present.      Mental Status: She is alert and oriented to person, place, and time.  "  Psychiatric:         Mood and Affect: Mood normal.         Behavior: Behavior normal.         Thought Content: Thought content normal.         Judgment: Judgment normal.          PAT AIRWAY:   Airway:     Mallampati::  II    TM distance::  >3 FB    Neck ROM::  Full  normal      BP (!) 162/110 Comment: maunual  Pulse 90   Temp 37 °C (98.6 °F) (Temporal)   Resp 16   Ht 1.676 m (5' 6\")   Wt 73.8 kg (162 lb 9.6 oz)   SpO2 98%   BMI 26.24 kg/m²     ASA:II  EDWIN:2.8%  RCRI:0.4%      DASI Risk Score      Flowsheet Row Pre-Admission Testing from 10/1/2024 in [facility]   Can you take care of yourself (eat, dress, bathe, or use toilet)?  2.75 filed at 10/01/2024 0918   Can you walk indoors, such as around your house? 1.75 filed at 10/01/2024 0918   Can you walk a block or two on level ground?  0 filed at 10/01/2024 0918   Can you climb a flight of stairs or walk up a hill? 5.5 filed at 10/01/2024 0918   Can you run a short distance? 0 filed at 10/01/2024 0918   Can you do light work around the house like dusting or washing dishes? 0 filed at 10/01/2024 0918   Can you do moderate work around the house like vacuuming, sweeping floors or carrying groceries? 0 filed at 10/01/2024 0918   Can you do heavy work around the house like scrubbing floors or lifting and moving heavy furniture?  8 filed at 10/01/2024 0918   Can you do yard work like raking leaves, weeding or pushing a mower? 4.5 filed at 10/01/2024 0918   Can you have sexual relations? 5.25 filed at 10/01/2024 0918   Can you participate in moderate recreational activities like golf, bowling, dancing, doubles tennis or throwing a baseball or football? 6 filed at 10/01/2024 0918   Can you participate in strenous sports like swimming, singles tennis, football, basketball, or skiing? 0 filed at 10/01/2024 0918   DASI SCORE 33.75 filed at 10/01/2024 0918   METS Score (Will be calculated only when all the questions are answered) 6.9 filed at 10/01/2024 " 0918          Caprini DVT Assessment    No data to display       Modified Frailty Index    No data to display       CHADS2 Stroke Risk         N/A 3 to 100%: High Risk   2 to < 3%: Medium Risk   0 to < 2%: Low Risk     Last Change: N/A          This score determines the patient's risk of having a stroke if the patient has atrial fibrillation.        This score is not applicable to this patient. Components are not calculated.          Revised Cardiac Risk Index      Flowsheet Row Pre-Admission Testing from 10/1/2024 in [facility]   High-Risk Surgery (Intraperitoneal, Intrathoracic,Suprainguinal vascular) 0 filed at 10/01/2024 0956   History of ischemic heart disease (History of MI, History of positive exercuse test, Current chest paint considered due to myocardial ischemia, Use of nitrate therapy, ECG with pathological Q Waves) 0 filed at 10/01/2024 0956   History of congestive heart failure (pulmonary edemia, bilateral rales or S3 gallop, Paroxysmal nocturnal dyspnea, CXR showing pulmonary vascular redistribution) 0 filed at 10/01/2024 0956   History of cerebrovascular disease (Prior TIA or stroke) 0 filed at 10/01/2024 0956   Pre-operative insulin treatment 0 filed at 10/01/2024 0956   Pre-operative creatinine>2 mg/dl 0 filed at 10/01/2024 0956   Revised Cardiac Risk Calculator 0 filed at 10/01/2024 0956          Apfel Simplified Score    No data to display       Risk Analysis Index Results This Encounter    No data found in the last 10 encounters.       Stop Bang Score      Flowsheet Row Pre-Admission Testing from 10/1/2024 in [facility]   Do you snore loudly? 1 filed at 10/01/2024 0919   Do you often feel tired or fatigued after your sleep? 0 filed at 10/01/2024 0919   Has anyone ever observed you stop breathing in your sleep? 0 filed at 10/01/2024 0919   Do you have or are you being treated for high blood pressure? 0 filed at 10/01/2024 0919   Recent BMI (Calculated) 26.3 filed  at 10/01/2024 0919   Is BMI greater than 35 kg/m2? 0=No filed at 10/01/2024 0919   Age older than 50 years old? 1=Yes filed at 10/01/2024 0919   Is your neck circumference greater than 17 inches (Male) or 16 inches (Female)? 0 filed at 10/01/2024 0919   Gender - Male 0=No filed at 10/01/2024 0919   STOP-BANG Total Score 2 filed at 10/01/2024 0919            Assessment and Plan:     Primary osteoarthritis of right knee : OPEN TOTAL KNEE ARTHROPLASTY   Hypothyroid:  Levothyroxine, TSH 2.74  7/15/24  Hyperlipidemia: Simvastatin  High blood pressure without diagnosis of hypertension: initial dynamap /135. Manual /110. Patient states she doesn't have high blood pressure. Patient will see her primary for clearance on 10/7/24. Patient was instructed to let the primary know her blood pressure was high today in PAT.    EKG collected in [facility]  CBC collected in [facility], [facility] 8/28/24  HECTOR Allen      EKG read by cardiologist. EKG with similar reading can be found scanned into [facility] date 1/23/2019.    JAS Maxwell-SAUL

## 2024-10-03 ENCOUNTER — APPOINTMENT (OUTPATIENT)
Dept: RADIOLOGY | Facility: HOSPITAL | Age: 84
End: 2024-10-03
Payer: MEDICARE

## 2024-10-03 ENCOUNTER — HOSPITAL ENCOUNTER (OUTPATIENT)
Facility: HOSPITAL | Age: 84
Setting detail: OBSERVATION
LOS: 1 days | Discharge: HOME | End: 2024-10-04
Attending: EMERGENCY MEDICINE | Admitting: INTERNAL MEDICINE
Payer: MEDICARE

## 2024-10-03 ENCOUNTER — APPOINTMENT (OUTPATIENT)
Dept: CARDIOLOGY | Facility: HOSPITAL | Age: 84
End: 2024-10-03
Payer: MEDICARE

## 2024-10-03 DIAGNOSIS — G45.9 TIA (TRANSIENT ISCHEMIC ATTACK): ICD-10-CM

## 2024-10-03 DIAGNOSIS — I63.9 CEREBROVASCULAR ACCIDENT (CVA), UNSPECIFIED MECHANISM (MULTI): ICD-10-CM

## 2024-10-03 DIAGNOSIS — I16.1 HYPERTENSIVE EMERGENCY: Primary | ICD-10-CM

## 2024-10-03 DIAGNOSIS — R42 DIZZINESS: ICD-10-CM

## 2024-10-03 DIAGNOSIS — G45.8 ACUTE ANTERIOR CIRCULATION TIA: ICD-10-CM

## 2024-10-03 PROBLEM — G89.29 CHRONIC PAIN OF RIGHT KNEE: Status: ACTIVE | Noted: 2024-10-03

## 2024-10-03 PROBLEM — R20.0 LEFT SIDED NUMBNESS: Status: ACTIVE | Noted: 2024-10-03

## 2024-10-03 PROBLEM — R03.0 ELEVATED BLOOD PRESSURE READING WITHOUT DIAGNOSIS OF HYPERTENSION: Status: ACTIVE | Noted: 2024-10-03

## 2024-10-03 PROBLEM — M25.561 CHRONIC PAIN OF RIGHT KNEE: Status: ACTIVE | Noted: 2024-10-03

## 2024-10-03 LAB
ALBUMIN SERPL BCP-MCNC: 4.5 G/DL (ref 3.4–5)
ALP SERPL-CCNC: 66 U/L (ref 33–136)
ALT SERPL W P-5'-P-CCNC: 15 U/L (ref 7–45)
ANION GAP SERPL CALCULATED.3IONS-SCNC: 13 MMOL/L (ref 10–20)
APTT PPP: 28.2 SECONDS (ref 22–32.5)
AST SERPL W P-5'-P-CCNC: 18 U/L (ref 9–39)
ATRIAL RATE: 78 BPM
BASOPHILS # BLD AUTO: 0.02 X10*3/UL (ref 0–0.1)
BASOPHILS NFR BLD AUTO: 0.4 %
BILIRUB SERPL-MCNC: 0.8 MG/DL (ref 0–1.2)
BUN SERPL-MCNC: 16 MG/DL (ref 6–23)
CALCIUM SERPL-MCNC: 9.2 MG/DL (ref 8.6–10.3)
CARDIAC TROPONIN I PNL SERPL HS: 9 NG/L (ref 0–13)
CHLORIDE SERPL-SCNC: 98 MMOL/L (ref 98–107)
CO2 SERPL-SCNC: 25 MMOL/L (ref 21–32)
CREAT SERPL-MCNC: 0.63 MG/DL (ref 0.5–1.05)
EGFRCR SERPLBLD CKD-EPI 2021: 88 ML/MIN/1.73M*2
EOSINOPHIL # BLD AUTO: 0.06 X10*3/UL (ref 0–0.4)
EOSINOPHIL NFR BLD AUTO: 1.1 %
ERYTHROCYTE [DISTWIDTH] IN BLOOD BY AUTOMATED COUNT: 12.4 % (ref 11.5–14.5)
GLUCOSE BLD MANUAL STRIP-MCNC: 140 MG/DL (ref 74–99)
GLUCOSE SERPL-MCNC: 123 MG/DL (ref 74–99)
HCT VFR BLD AUTO: 46.7 % (ref 36–46)
HGB BLD-MCNC: 15.6 G/DL (ref 12–16)
IMM GRANULOCYTES # BLD AUTO: 0.02 X10*3/UL (ref 0–0.5)
IMM GRANULOCYTES NFR BLD AUTO: 0.4 % (ref 0–0.9)
INR PPP: 1 (ref 0.9–1.2)
LYMPHOCYTES # BLD AUTO: 2.33 X10*3/UL (ref 0.8–3)
LYMPHOCYTES NFR BLD AUTO: 43.9 %
MCH RBC QN AUTO: 30.6 PG (ref 26–34)
MCHC RBC AUTO-ENTMCNC: 33.4 G/DL (ref 32–36)
MCV RBC AUTO: 92 FL (ref 80–100)
MONOCYTES # BLD AUTO: 0.69 X10*3/UL (ref 0.05–0.8)
MONOCYTES NFR BLD AUTO: 13 %
NEUTROPHILS # BLD AUTO: 2.19 X10*3/UL (ref 1.6–5.5)
NEUTROPHILS NFR BLD AUTO: 41.2 %
NRBC BLD-RTO: 0 /100 WBCS (ref 0–0)
P AXIS: 67 DEGREES
P OFFSET: 194 MS
P ONSET: 134 MS
PLATELET # BLD AUTO: 147 X10*3/UL (ref 150–450)
POTASSIUM SERPL-SCNC: 3.7 MMOL/L (ref 3.5–5.3)
PR INTERVAL: 182 MS
PROT SERPL-MCNC: 7.5 G/DL (ref 6.4–8.2)
PROTHROMBIN TIME: 10.8 SECONDS (ref 9.3–12.7)
Q ONSET: 225 MS
QRS COUNT: 13 BEATS
QRS DURATION: 120 MS
QT INTERVAL: 414 MS
QTC CALCULATION(BAZETT): 471 MS
QTC FREDERICIA: 451 MS
R AXIS: -24 DEGREES
RBC # BLD AUTO: 5.1 X10*6/UL (ref 4–5.2)
SODIUM SERPL-SCNC: 132 MMOL/L (ref 136–145)
STAPHYLOCOCCUS SPEC CULT: NORMAL
T AXIS: 84 DEGREES
T OFFSET: 432 MS
VENTRICULAR RATE: 78 BPM
WBC # BLD AUTO: 5.3 X10*3/UL (ref 4.4–11.3)

## 2024-10-03 PROCEDURE — G0378 HOSPITAL OBSERVATION PER HR: HCPCS

## 2024-10-03 PROCEDURE — 70544 MR ANGIOGRAPHY HEAD W/O DYE: CPT

## 2024-10-03 PROCEDURE — 82947 ASSAY GLUCOSE BLOOD QUANT: CPT

## 2024-10-03 PROCEDURE — 93005 ELECTROCARDIOGRAM TRACING: CPT

## 2024-10-03 PROCEDURE — 96375 TX/PRO/DX INJ NEW DRUG ADDON: CPT

## 2024-10-03 PROCEDURE — 70551 MRI BRAIN STEM W/O DYE: CPT

## 2024-10-03 PROCEDURE — 85610 PROTHROMBIN TIME: CPT | Performed by: EMERGENCY MEDICINE

## 2024-10-03 PROCEDURE — 85730 THROMBOPLASTIN TIME PARTIAL: CPT | Performed by: EMERGENCY MEDICINE

## 2024-10-03 PROCEDURE — 2500000004 HC RX 250 GENERAL PHARMACY W/ HCPCS (ALT 636 FOR OP/ED): Performed by: NURSE PRACTITIONER

## 2024-10-03 PROCEDURE — 99291 CRITICAL CARE FIRST HOUR: CPT

## 2024-10-03 PROCEDURE — 96365 THER/PROPH/DIAG IV INF INIT: CPT

## 2024-10-03 PROCEDURE — 2500000004 HC RX 250 GENERAL PHARMACY W/ HCPCS (ALT 636 FOR OP/ED): Performed by: EMERGENCY MEDICINE

## 2024-10-03 PROCEDURE — 85025 COMPLETE CBC W/AUTO DIFF WBC: CPT | Performed by: EMERGENCY MEDICINE

## 2024-10-03 PROCEDURE — 96366 THER/PROPH/DIAG IV INF ADDON: CPT

## 2024-10-03 PROCEDURE — 80053 COMPREHEN METABOLIC PANEL: CPT | Performed by: EMERGENCY MEDICINE

## 2024-10-03 PROCEDURE — 84484 ASSAY OF TROPONIN QUANT: CPT | Performed by: EMERGENCY MEDICINE

## 2024-10-03 PROCEDURE — 36415 COLL VENOUS BLD VENIPUNCTURE: CPT | Performed by: EMERGENCY MEDICINE

## 2024-10-03 PROCEDURE — 70450 CT HEAD/BRAIN W/O DYE: CPT

## 2024-10-03 PROCEDURE — 70450 CT HEAD/BRAIN W/O DYE: CPT | Performed by: RADIOLOGY

## 2024-10-03 RX ORDER — GUAIFENESIN/DEXTROMETHORPHAN 100-10MG/5
5 SYRUP ORAL EVERY 4 HOURS PRN
Status: DISCONTINUED | OUTPATIENT
Start: 2024-10-03 | End: 2024-10-04 | Stop reason: HOSPADM

## 2024-10-03 RX ORDER — FAMOTIDINE 10 MG/ML
20 INJECTION INTRAVENOUS 2 TIMES DAILY
Status: DISCONTINUED | OUTPATIENT
Start: 2024-10-04 | End: 2024-10-04 | Stop reason: HOSPADM

## 2024-10-03 RX ORDER — ASPIRIN 325 MG
325 TABLET ORAL ONCE
Status: DISCONTINUED | OUTPATIENT
Start: 2024-10-03 | End: 2024-10-03

## 2024-10-03 RX ORDER — POLYETHYLENE GLYCOL 3350 17 G/17G
17 POWDER, FOR SOLUTION ORAL AS NEEDED
Status: DISCONTINUED | OUTPATIENT
Start: 2024-10-03 | End: 2024-10-04 | Stop reason: HOSPADM

## 2024-10-03 RX ORDER — DESMOPRESSIN ACETATE 0.1 MG/1
0.2 TABLET ORAL DAILY
Status: DISCONTINUED | OUTPATIENT
Start: 2024-10-04 | End: 2024-10-04 | Stop reason: HOSPADM

## 2024-10-03 RX ORDER — SIMVASTATIN 40 MG/1
40 TABLET, FILM COATED ORAL NIGHTLY
Status: DISCONTINUED | OUTPATIENT
Start: 2024-10-04 | End: 2024-10-04 | Stop reason: HOSPADM

## 2024-10-03 RX ORDER — LEVOTHYROXINE SODIUM 100 UG/1
100 TABLET ORAL DAILY
Status: DISCONTINUED | OUTPATIENT
Start: 2024-10-04 | End: 2024-10-04 | Stop reason: HOSPADM

## 2024-10-03 RX ORDER — BISACODYL 10 MG/1
10 SUPPOSITORY RECTAL DAILY PRN
Status: DISCONTINUED | OUTPATIENT
Start: 2024-10-03 | End: 2024-10-04 | Stop reason: HOSPADM

## 2024-10-03 RX ORDER — NICARDIPINE HYDROCHLORIDE 0.2 MG/ML
2.5-15 INJECTION INTRAVENOUS CONTINUOUS
Status: DISCONTINUED | OUTPATIENT
Start: 2024-10-03 | End: 2024-10-04 | Stop reason: HOSPADM

## 2024-10-03 RX ORDER — PROCHLORPERAZINE 25 MG/1
25 SUPPOSITORY RECTAL EVERY 12 HOURS PRN
Status: DISCONTINUED | OUTPATIENT
Start: 2024-10-03 | End: 2024-10-04 | Stop reason: HOSPADM

## 2024-10-03 RX ORDER — PROCHLORPERAZINE EDISYLATE 5 MG/ML
10 INJECTION INTRAMUSCULAR; INTRAVENOUS EVERY 6 HOURS PRN
Status: DISCONTINUED | OUTPATIENT
Start: 2024-10-03 | End: 2024-10-04 | Stop reason: HOSPADM

## 2024-10-03 RX ORDER — ONDANSETRON HYDROCHLORIDE 2 MG/ML
4 INJECTION, SOLUTION INTRAVENOUS EVERY 8 HOURS PRN
Status: DISCONTINUED | OUTPATIENT
Start: 2024-10-03 | End: 2024-10-04 | Stop reason: HOSPADM

## 2024-10-03 RX ORDER — ASPIRIN 81 MG/1
81 TABLET ORAL DAILY
Status: DISCONTINUED | OUTPATIENT
Start: 2024-10-04 | End: 2024-10-04

## 2024-10-03 RX ORDER — ACETAMINOPHEN 650 MG/1
650 SUPPOSITORY RECTAL EVERY 4 HOURS PRN
Status: DISCONTINUED | OUTPATIENT
Start: 2024-10-03 | End: 2024-10-04 | Stop reason: HOSPADM

## 2024-10-03 RX ORDER — NAPROXEN SODIUM 220 MG/1
81 TABLET, FILM COATED ORAL DAILY
Status: DISCONTINUED | OUTPATIENT
Start: 2024-10-04 | End: 2024-10-03 | Stop reason: ALTCHOICE

## 2024-10-03 RX ORDER — BISACODYL 5 MG
10 TABLET, DELAYED RELEASE (ENTERIC COATED) ORAL DAILY PRN
Status: DISCONTINUED | OUTPATIENT
Start: 2024-10-03 | End: 2024-10-04 | Stop reason: HOSPADM

## 2024-10-03 RX ORDER — ACETAMINOPHEN 160 MG/5ML
650 SOLUTION ORAL EVERY 4 HOURS PRN
Status: DISCONTINUED | OUTPATIENT
Start: 2024-10-03 | End: 2024-10-04 | Stop reason: HOSPADM

## 2024-10-03 RX ORDER — ONDANSETRON 4 MG/1
4 TABLET, FILM COATED ORAL EVERY 8 HOURS PRN
Status: DISCONTINUED | OUTPATIENT
Start: 2024-10-03 | End: 2024-10-04 | Stop reason: HOSPADM

## 2024-10-03 RX ORDER — ACETAMINOPHEN 325 MG/1
650 TABLET ORAL EVERY 4 HOURS PRN
Status: DISCONTINUED | OUTPATIENT
Start: 2024-10-03 | End: 2024-10-04 | Stop reason: HOSPADM

## 2024-10-03 RX ORDER — HEPARIN SODIUM 5000 [USP'U]/ML
5000 INJECTION, SOLUTION INTRAVENOUS; SUBCUTANEOUS EVERY 12 HOURS
Status: DISCONTINUED | OUTPATIENT
Start: 2024-10-04 | End: 2024-10-03 | Stop reason: SDUPTHER

## 2024-10-03 RX ORDER — FAMOTIDINE 20 MG/1
20 TABLET, FILM COATED ORAL 2 TIMES DAILY
Status: DISCONTINUED | OUTPATIENT
Start: 2024-10-04 | End: 2024-10-04 | Stop reason: HOSPADM

## 2024-10-03 RX ORDER — CHOLECALCIFEROL (VITAMIN D3) 25 MCG
5000 TABLET,CHEWABLE ORAL DAILY
Status: DISCONTINUED | OUTPATIENT
Start: 2024-10-04 | End: 2024-10-04 | Stop reason: HOSPADM

## 2024-10-03 RX ORDER — HEPARIN SODIUM 5000 [USP'U]/ML
5000 INJECTION, SOLUTION INTRAVENOUS; SUBCUTANEOUS 2 TIMES DAILY
Status: DISCONTINUED | OUTPATIENT
Start: 2024-10-04 | End: 2024-10-04 | Stop reason: HOSPADM

## 2024-10-03 RX ORDER — PROCHLORPERAZINE MALEATE 10 MG
10 TABLET ORAL EVERY 6 HOURS PRN
Status: DISCONTINUED | OUTPATIENT
Start: 2024-10-03 | End: 2024-10-04 | Stop reason: HOSPADM

## 2024-10-03 RX ORDER — HYDRALAZINE HYDROCHLORIDE 20 MG/ML
5 INJECTION INTRAMUSCULAR; INTRAVENOUS EVERY 4 HOURS PRN
Status: DISCONTINUED | OUTPATIENT
Start: 2024-10-03 | End: 2024-10-04 | Stop reason: HOSPADM

## 2024-10-03 SDOH — SOCIAL STABILITY: SOCIAL INSECURITY
WITHIN THE LAST YEAR, HAVE TO BEEN RAPED OR FORCED TO HAVE ANY KIND OF SEXUAL ACTIVITY BY YOUR PARTNER OR EX-PARTNER?: NO

## 2024-10-03 SDOH — HEALTH STABILITY: MENTAL HEALTH
HOW OFTEN DO YOU NEED TO HAVE SOMEONE HELP YOU WHEN YOU READ INSTRUCTIONS, PAMPHLETS, OR OTHER WRITTEN MATERIAL FROM YOUR DOCTOR OR PHARMACY?: NEVER

## 2024-10-03 SDOH — HEALTH STABILITY: MENTAL HEALTH: HOW OFTEN DO YOU HAVE 6 OR MORE DRINKS ON ONE OCCASION?: NEVER

## 2024-10-03 SDOH — ECONOMIC STABILITY: HOUSING INSECURITY: AT ANY TIME IN THE PAST 12 MONTHS, WERE YOU HOMELESS OR LIVING IN A SHELTER (INCLUDING NOW)?: NO

## 2024-10-03 SDOH — SOCIAL STABILITY: SOCIAL INSECURITY: ARE YOU OR HAVE YOU BEEN THREATENED OR ABUSED PHYSICALLY, EMOTIONALLY, OR SEXUALLY BY ANYONE?: NO

## 2024-10-03 SDOH — HEALTH STABILITY: PHYSICAL HEALTH: ON AVERAGE, HOW MANY DAYS PER WEEK DO YOU ENGAGE IN MODERATE TO STRENUOUS EXERCISE (LIKE A BRISK WALK)?: 0 DAYS

## 2024-10-03 SDOH — SOCIAL STABILITY: SOCIAL INSECURITY: HAVE YOU HAD THOUGHTS OF HARMING ANYONE ELSE?: NO

## 2024-10-03 SDOH — ECONOMIC STABILITY: FOOD INSECURITY: WITHIN THE PAST 12 MONTHS, YOU WORRIED THAT YOUR FOOD WOULD RUN OUT BEFORE YOU GOT MONEY TO BUY MORE.: NEVER TRUE

## 2024-10-03 SDOH — HEALTH STABILITY: MENTAL HEALTH: HOW OFTEN DO YOU HAVE A DRINK CONTAINING ALCOHOL?: 2-4 TIMES A MONTH

## 2024-10-03 SDOH — SOCIAL STABILITY: SOCIAL INSECURITY: ARE THERE ANY APPARENT SIGNS OF INJURIES/BEHAVIORS THAT COULD BE RELATED TO ABUSE/NEGLECT?: NO

## 2024-10-03 SDOH — HEALTH STABILITY: MENTAL HEALTH: HOW MANY STANDARD DRINKS CONTAINING ALCOHOL DO YOU HAVE ON A TYPICAL DAY?: 1 OR 2

## 2024-10-03 SDOH — HEALTH STABILITY: MENTAL HEALTH
STRESS IS WHEN SOMEONE FEELS TENSE, NERVOUS, ANXIOUS, OR CAN'T SLEEP AT NIGHT BECAUSE THEIR MIND IS TROUBLED. HOW STRESSED ARE YOU?: NOT AT ALL

## 2024-10-03 SDOH — ECONOMIC STABILITY: INCOME INSECURITY: IN THE PAST 12 MONTHS, HAS THE ELECTRIC, GAS, OIL, OR WATER COMPANY THREATENED TO SHUT OFF SERVICE IN YOUR HOME?: NO

## 2024-10-03 SDOH — ECONOMIC STABILITY: INCOME INSECURITY: IN THE LAST 12 MONTHS, WAS THERE A TIME WHEN YOU WERE NOT ABLE TO PAY THE MORTGAGE OR RENT ON TIME?: NO

## 2024-10-03 SDOH — SOCIAL STABILITY: SOCIAL INSECURITY: DOES ANYONE TRY TO KEEP YOU FROM HAVING/CONTACTING OTHER FRIENDS OR DOING THINGS OUTSIDE YOUR HOME?: NO

## 2024-10-03 SDOH — ECONOMIC STABILITY: HOUSING INSECURITY: IN THE PAST 12 MONTHS, HOW MANY TIMES HAVE YOU MOVED WHERE YOU WERE LIVING?: 0

## 2024-10-03 SDOH — ECONOMIC STABILITY: INCOME INSECURITY: HOW HARD IS IT FOR YOU TO PAY FOR THE VERY BASICS LIKE FOOD, HOUSING, MEDICAL CARE, AND HEATING?: NOT HARD AT ALL

## 2024-10-03 SDOH — HEALTH STABILITY: MENTAL HEALTH: BEHAVIORAL HEALTH(WDL): WITHIN DEFINED LIMITS

## 2024-10-03 SDOH — SOCIAL STABILITY: SOCIAL INSECURITY: WITHIN THE LAST YEAR, HAVE YOU BEEN AFRAID OF YOUR PARTNER OR EX-PARTNER?: NO

## 2024-10-03 SDOH — ECONOMIC STABILITY: FOOD INSECURITY: WITHIN THE PAST 12 MONTHS, THE FOOD YOU BOUGHT JUST DIDN'T LAST AND YOU DIDN'T HAVE MONEY TO GET MORE.: NEVER TRUE

## 2024-10-03 SDOH — SOCIAL STABILITY: SOCIAL INSECURITY: DO YOU FEEL ANYONE HAS EXPLOITED OR TAKEN ADVANTAGE OF YOU FINANCIALLY OR OF YOUR PERSONAL PROPERTY?: NO

## 2024-10-03 SDOH — SOCIAL STABILITY: SOCIAL NETWORK: HOW OFTEN DO YOU ATTEND CHURCH OR RELIGIOUS SERVICES?: NEVER

## 2024-10-03 SDOH — SOCIAL STABILITY: SOCIAL INSECURITY: WERE YOU ABLE TO COMPLETE ALL THE BEHAVIORAL HEALTH SCREENINGS?: YES

## 2024-10-03 SDOH — SOCIAL STABILITY: SOCIAL NETWORK: HOW OFTEN DO YOU GET TOGETHER WITH FRIENDS OR RELATIVES?: MORE THAN THREE TIMES A WEEK

## 2024-10-03 SDOH — SOCIAL STABILITY: SOCIAL INSECURITY: WITHIN THE LAST YEAR, HAVE YOU BEEN HUMILIATED OR EMOTIONALLY ABUSED IN OTHER WAYS BY YOUR PARTNER OR EX-PARTNER?: NO

## 2024-10-03 SDOH — SOCIAL STABILITY: SOCIAL NETWORK: ARE YOU MARRIED, WIDOWED, DIVORCED, SEPARATED, NEVER MARRIED, OR LIVING WITH A PARTNER?: MARRIED

## 2024-10-03 SDOH — SOCIAL STABILITY: SOCIAL NETWORK
DO YOU BELONG TO ANY CLUBS OR ORGANIZATIONS SUCH AS CHURCH GROUPS UNIONS, FRATERNAL OR ATHLETIC GROUPS, OR SCHOOL GROUPS?: NO

## 2024-10-03 SDOH — SOCIAL STABILITY: SOCIAL INSECURITY: HAS ANYONE EVER THREATENED TO HURT YOUR FAMILY OR YOUR PETS?: NO

## 2024-10-03 SDOH — SOCIAL STABILITY: SOCIAL INSECURITY
WITHIN THE LAST YEAR, HAVE YOU BEEN KICKED, HIT, SLAPPED, OR OTHERWISE PHYSICALLY HURT BY YOUR PARTNER OR EX-PARTNER?: NO

## 2024-10-03 SDOH — ECONOMIC STABILITY: TRANSPORTATION INSECURITY
IN THE PAST 12 MONTHS, HAS THE LACK OF TRANSPORTATION KEPT YOU FROM MEDICAL APPOINTMENTS OR FROM GETTING MEDICATIONS?: NO

## 2024-10-03 SDOH — SOCIAL STABILITY: SOCIAL NETWORK: HOW OFTEN DO YOU ATTENT MEETINGS OF THE CLUB OR ORGANIZATION YOU BELONG TO?: NEVER

## 2024-10-03 SDOH — HEALTH STABILITY: PHYSICAL HEALTH: ON AVERAGE, HOW MANY MINUTES DO YOU ENGAGE IN EXERCISE AT THIS LEVEL?: 0 MIN

## 2024-10-03 SDOH — SOCIAL STABILITY: SOCIAL NETWORK
IN A TYPICAL WEEK, HOW MANY TIMES DO YOU TALK ON THE PHONE WITH FAMILY, FRIENDS, OR NEIGHBORS?: MORE THAN THREE TIMES A WEEK

## 2024-10-03 SDOH — SOCIAL STABILITY: SOCIAL INSECURITY: ABUSE: ADULT

## 2024-10-03 SDOH — ECONOMIC STABILITY: TRANSPORTATION INSECURITY
IN THE PAST 12 MONTHS, HAS LACK OF TRANSPORTATION KEPT YOU FROM MEETINGS, WORK, OR FROM GETTING THINGS NEEDED FOR DAILY LIVING?: NO

## 2024-10-03 SDOH — SOCIAL STABILITY: SOCIAL INSECURITY: DO YOU FEEL UNSAFE GOING BACK TO THE PLACE WHERE YOU ARE LIVING?: NO

## 2024-10-03 ASSESSMENT — ENCOUNTER SYMPTOMS
CHEST TIGHTNESS: 0
ABDOMINAL PAIN: 0
CONSTIPATION: 0
FACIAL ASYMMETRY: 0
VOICE CHANGE: 0
FEVER: 0
CHILLS: 0
ARTHRALGIAS: 0
PALPITATIONS: 0
MYALGIAS: 1
HEMATOLOGIC/LYMPHATIC NEGATIVE: 1
VOMITING: 0
DIFFICULTY URINATING: 0
SHORTNESS OF BREATH: 0
ACTIVITY CHANGE: 1
COUGH: 0
NAUSEA: 1
ENDOCRINE NEGATIVE: 1
DIAPHORESIS: 0
TREMORS: 0
ABDOMINAL DISTENTION: 0
UNEXPECTED WEIGHT CHANGE: 0
TROUBLE SWALLOWING: 0
WHEEZING: 0
CHOKING: 0
APNEA: 0
SORE THROAT: 0
PSYCHIATRIC NEGATIVE: 1
BACK PAIN: 0
SEIZURES: 0
DIZZINESS: 1
FATIGUE: 0
NUMBNESS: 1
APPETITE CHANGE: 0
HEADACHES: 0
NECK STIFFNESS: 0
BLOOD IN STOOL: 0
HEMATURIA: 0
RHINORRHEA: 0
SPEECH DIFFICULTY: 0
LIGHT-HEADEDNESS: 1
NECK PAIN: 0
EYES NEGATIVE: 1
DIARRHEA: 0
SINUS PAIN: 0
WEAKNESS: 1

## 2024-10-03 ASSESSMENT — COLUMBIA-SUICIDE SEVERITY RATING SCALE - C-SSRS
6. HAVE YOU EVER DONE ANYTHING, STARTED TO DO ANYTHING, OR PREPARED TO DO ANYTHING TO END YOUR LIFE?: NO
1. IN THE PAST MONTH, HAVE YOU WISHED YOU WERE DEAD OR WISHED YOU COULD GO TO SLEEP AND NOT WAKE UP?: NO
2. HAVE YOU ACTUALLY HAD ANY THOUGHTS OF KILLING YOURSELF?: NO

## 2024-10-03 ASSESSMENT — LIFESTYLE VARIABLES
AUDIT-C TOTAL SCORE: 2
HAVE PEOPLE ANNOYED YOU BY CRITICIZING YOUR DRINKING: NO
PRESCIPTION_ABUSE_PAST_12_MONTHS: NO
SKIP TO QUESTIONS 9-10: 1
SKIP TO QUESTIONS 9-10: 1
EVER HAD A DRINK FIRST THING IN THE MORNING TO STEADY YOUR NERVES TO GET RID OF A HANGOVER: NO
TOTAL SCORE: 0
SUBSTANCE_ABUSE_PAST_12_MONTHS: NO
HAVE YOU EVER FELT YOU SHOULD CUT DOWN ON YOUR DRINKING: NO
HOW MANY STANDARD DRINKS CONTAINING ALCOHOL DO YOU HAVE ON A TYPICAL DAY: PATIENT DOES NOT DRINK
AUDIT-C TOTAL SCORE: 0
AUDIT-C TOTAL SCORE: 0
HOW OFTEN DO YOU HAVE 6 OR MORE DRINKS ON ONE OCCASION: NEVER
HOW OFTEN DO YOU HAVE A DRINK CONTAINING ALCOHOL: NEVER
EVER FELT BAD OR GUILTY ABOUT YOUR DRINKING: NO

## 2024-10-03 ASSESSMENT — ACTIVITIES OF DAILY LIVING (ADL)
TOILETING: INDEPENDENT
GROOMING: INDEPENDENT
ADEQUATE_TO_COMPLETE_ADL: YES
DRESSING YOURSELF: INDEPENDENT
HEARING - RIGHT EAR: FUNCTIONAL
BATHING: INDEPENDENT
FEEDING YOURSELF: INDEPENDENT
WALKS IN HOME: INDEPENDENT
PATIENT'S MEMORY ADEQUATE TO SAFELY COMPLETE DAILY ACTIVITIES?: YES
JUDGMENT_ADEQUATE_SAFELY_COMPLETE_DAILY_ACTIVITIES: YES
LACK_OF_TRANSPORTATION: NO
HEARING - LEFT EAR: FUNCTIONAL

## 2024-10-03 ASSESSMENT — COGNITIVE AND FUNCTIONAL STATUS - GENERAL
PATIENT BASELINE BEDBOUND: NO
MOVING TO AND FROM BED TO CHAIR: A LITTLE
DRESSING REGULAR UPPER BODY CLOTHING: A LITTLE
PERSONAL GROOMING: A LITTLE
TURNING FROM BACK TO SIDE WHILE IN FLAT BAD: A LITTLE
TOILETING: A LITTLE
CLIMB 3 TO 5 STEPS WITH RAILING: A LITTLE
STANDING UP FROM CHAIR USING ARMS: A LITTLE
DAILY ACTIVITIY SCORE: 19
HELP NEEDED FOR BATHING: A LITTLE
DRESSING REGULAR LOWER BODY CLOTHING: A LITTLE
MOBILITY SCORE: 19
WALKING IN HOSPITAL ROOM: A LITTLE

## 2024-10-03 ASSESSMENT — PATIENT HEALTH QUESTIONNAIRE - PHQ9
1. LITTLE INTEREST OR PLEASURE IN DOING THINGS: NOT AT ALL
2. FEELING DOWN, DEPRESSED OR HOPELESS: NOT AT ALL
SUM OF ALL RESPONSES TO PHQ9 QUESTIONS 1 & 2: 0

## 2024-10-03 ASSESSMENT — PAIN - FUNCTIONAL ASSESSMENT: PAIN_FUNCTIONAL_ASSESSMENT: 0-10

## 2024-10-03 ASSESSMENT — PAIN SCALES - GENERAL: PAINLEVEL_OUTOF10: 0 - NO PAIN

## 2024-10-03 NOTE — ASSESSMENT & PLAN NOTE
Patient's blood pressure was around 180 we will keep blood pressure high due to possible TIA  Order hydralazine if systolic blood pressure above 220  Consider to start patient on oral blood pressure meds if remains high after 24 hours

## 2024-10-03 NOTE — ED NOTES
Keisha Baxter is a 84 y.o. who presents to the emergency department with complaints of dizziness and hypertension.  She states this occurred suddenly while getting up and watching her coffee cup.  She was preparing to go rollerskating with her .  She has never had anything like this previously.  EMS arrived and noted that her symptoms are worse upon standing however her blood pressure was significantly elevated.  She denies a history of hypertension and is on no antihypertensive medications per her report.  Shortly after arrival to the emergency department the patient complained of numbness and tingling to the left upper and left lower extremity and there was nursing concern for possible facial droop.  Patient complains of minimal headache.  No chest pain or difficulty breathing.  No other acute complaints.         PMHX:  Past Medical History:   Diagnosis Date    Arthritis     Disease of thyroid gland     Hyperlipidemia         Allergies   Allergen Reactions    Penicillins Hives and Unknown     hives    Shellfish Derived Hives         LABS:   Latest Reference Range & Units 10/03/24 10:23   GLUCOSE 74 - 99 mg/dL 123 (H)   SODIUM 136 - 145 mmol/L 132 (L)   POTASSIUM 3.5 - 5.3 mmol/L 3.7   CHLORIDE 98 - 107 mmol/L 98   Bicarbonate 21 - 32 mmol/L 25   Anion Gap 10 - 20 mmol/L 13   Blood Urea Nitrogen 6 - 23 mg/dL 16   Creatinine 0.50 - 1.05 mg/dL 0.63   EGFR >60 mL/min/1.73m*2 88   Calcium 8.6 - 10.3 mg/dL 9.2   Albumin 3.4 - 5.0 g/dL 4.5   Alkaline Phosphatase 33 - 136 U/L 66   ALT 7 - 45 U/L 15   AST 9 - 39 U/L 18   Bilirubin Total 0.0 - 1.2 mg/dL 0.8   Total Protein 6.4 - 8.2 g/dL 7.5   Troponin I, High Sensitivity 0 - 13 ng/L 9   (H): Data is abnormally high  (L): Data is abnormally low   Latest Reference Range & Units 10/03/24 10:23   INR 0.9 - 1.2  1.0   Protime 9.3 - 12.7 seconds 10.8   aPTT 22.0 - 32.5 seconds 28.2   WBC 4.4 - 11.3 x10*3/uL 5.3   nRBC 0.0 - 0.0 /100 WBCs 0.0   RBC 4.00 - 5.20  x10*6/uL 5.10   HEMOGLOBIN 12.0 - 16.0 g/dL 15.6   HEMATOCRIT 36.0 - 46.0 % 46.7 (H)   MCV 80 - 100 fL 92   MCH 26.0 - 34.0 pg 30.6   MCHC 32.0 - 36.0 g/dL 33.4   RED CELL DISTRIBUTION WIDTH 11.5 - 14.5 % 12.4   Platelets 150 - 450 x10*3/uL 147 (L)   (H): Data is abnormally high  (L): Data is abnormally low          PLAN: MULU FOR MRI                   Faith Atwood, RN  10/03/24 [id number]

## 2024-10-03 NOTE — ASSESSMENT & PLAN NOTE
Patient has tingling numbness on left side, addition light headache  MRI MRI ultrasound of carotid  Order D echo  Consult neuro  Neurocheck every shift and if needed  Fall precautions and aspiration precaution

## 2024-10-03 NOTE — PROGRESS NOTES
10/03/24 1510   Physical Activity   On average, how many days per week do you engage in moderate to strenuous exercise (like a brisk walk)? 0 days   On average, how many minutes do you engage in exercise at this level? 0 min   Financial Resource Strain   How hard is it for you to pay for the very basics like food, housing, medical care, and heating? Not hard   Housing Stability   In the last 12 months, was there a time when you were not able to pay the mortgage or rent on time? N   In the past 12 months, how many times have you moved where you were living? 0   At any time in the past 12 months, were you homeless or living in a shelter (including now)? N   Transportation Needs   In the past 12 months, has lack of transportation kept you from medical appointments or from getting medications? no   In the past 12 months, has lack of transportation kept you from meetings, work, or from getting things needed for daily living? No   Food Insecurity   Within the past 12 months, you worried that your food would run out before you got the money to buy more. Never true   Within the past 12 months, the food you bought just didn't last and you didn't have money to get more. Never true   Stress   Do you feel stress - tense, restless, nervous, or anxious, or unable to sleep at night because your mind is troubled all the time - these days? Not at all   Social Connections   In a typical week, how many times do you talk on the phone with family, friends, or neighbors? More than 3   How often do you get together with friends or relatives? More than 3   How often do you attend Yarsani or Jehovah's witness services? Never   Do you belong to any clubs or organizations such as Yarsani groups, unions, fraternal or athletic groups, or school groups? No   How often do you attend meetings of the clubs or organizations you belong to? Never   Are you , , , , never , or living with a partner?    Intimate  Partner Violence   Within the last year, have you been afraid of your partner or ex-partner? No   Within the last year, have you been humiliated or emotionally abused in other ways by your partner or ex-partner? No   Within the last year, have you been kicked, hit, slapped, or otherwise physically hurt by your partner or ex-partner? No   Within the last year, have you been raped or forced to have any kind of sexual activity by your partner or ex-partner? No   Alcohol Use   Q1: How often do you have a drink containing alcohol? 2-4 pr month   Q2: How many drinks containing alcohol do you have on a typical day when you are drinking? 1 or 2   Q3: How often do you have six or more drinks on one occasion? Never   Utilities   In the past 12 months has the electric, gas, oil, or water company threatened to shut off services in your home? No   Health Literacy   How often do you need to have someone help you when you read instructions, pamphlets, or other written material from your doctor or pharmacy? Never

## 2024-10-03 NOTE — PROGRESS NOTES
Spiritual Care Visit    Clinical Encounter Type  Visited With: Patient  Routine Visit: Introduction    Advent Encounters  Advent Needs:  (Emotional Support.)      received notification for Brain attack alert. Patient was resting and appears comfortable.  greeted the patient's spouse who was in the room.  explained the role for support and provided a listening presence as the patient shared the events of earlier today. Patient expressed concern and the  offered a calm reassuring presence. No spiritual or Presybeterian needs were expressed today. Spiritual care will provide follow up as requested. No other needs were identified at this time.                                     Taxonomy  Intended Effects: Establish rapport and connectedness, Build relationship of care and support, Demonstrate caring and concern

## 2024-10-03 NOTE — SIGNIFICANT EVENT
CRITICAL CARE SIGNIFICANT EVENT NOTE:    Date:  10/3/2024  Patient:  Keisha Baxter  YOB: 1940  MRN:  [id number]   Admit Date:  10/3/2024  =============================================================================================    Called to ED to evaluate patient for ICU admission.  Patient is a 84-year-old female with PMH of HLD, hypothyroidism, and overactive bladder.  Presented to the ED for dizziness and hypertension.  Patient states that she has been having bouts of dizziness/vertigo for the past several months, has not sought any treatment for this.  She says she can occasionally has neck pain prior to these episodes.  Today she is preparing to go rollerskating with her  when she became extremely dizzy.  This was unlike her other episodes and did not improve.  She presented to emergency department.  She then developed left upper extremity and and left lower extremity numbness/tingling.  Stroke alert was called.  CT head was negative.  But patient did have significant elevated blood pressure 240/140.     She was started on nicardipine for blood pressure control and improved significantly to 170/80.  With control of her blood pressure symptoms resolved.  She was able to be titrated off nicardipine.    Exam:  On my exam patient is awake, alert, orient x 3.  No neurological deficits.  NIH 0.  Strength and sensation is equal in bilateral upper and lower extremities.  BP is now 174/80.  Heart rate 80.      Action Plan:  - Off nicardipine, BP currently controlled and asymptomatic  - Recommend admission to medicine  - Initiate on oral antihypertensives, lisinopril would be a good choice  - Can use as needed labetalol or hydralazine to treat SBP >180  - Given episodes of vertigo and family history of stroke, would recommend CT angio of head and neck versus MRI/MRA of head and neck    If patient becomes symptomatic or requires nicardipine ICU happy to reevaluate for admission.    Glenroy MICHAELS  Benedict APRN-CNP  Pulmonary & Critical Care Medicine  [facility]

## 2024-10-03 NOTE — PROGRESS NOTES
10/03/24 1515   [facility] Disability Status   Are you deaf or do you have serious difficulty hearing? N   Are you blind or do you have serious difficulty seeing, even when wearing glasses? N   Because of a physical, mental, or emotional condition, do you have serious difficulty concentrating, remembering, or making decisions? (5 years old or older) N   Do you have serious difficulty walking or climbing stairs? N   Do you have serious difficulty dressing or bathing? N   Because of a physical, mental, or emotional condition, do you have serious difficulty doing errands alone such as visiting the doctor? N                                   10/03/24 1515   [facility] Disability Status   Are you deaf or do you have serious difficulty hearing? N   Are you blind or do you have serious difficulty seeing, even when wearing glasses? N   Because of a physical, mental, or emotional condition, do you have serious difficulty concentrating, remembering, or making decisions? (5 years old or older) N   Do you have serious difficulty walking or climbing stairs? N   Do you have serious difficulty dressing or bathing? N   Because of a physical, mental, or emotional condition, do you have serious difficulty doing errands alone such as visiting the doctor? N

## 2024-10-03 NOTE — PROGRESS NOTES
10/03/24 1512   Discharge Planning   Living Arrangements Spouse/significant other   Support Systems Spouse/significant other   Type of Residence Private residence   Number of Stairs to Enter Residence 0   Number of Stairs Within Residence 0   Do you have animals or pets at home? No   Who is requesting discharge planning? Provider   Home or Post Acute Services None   Expected Discharge Disposition Home   Does the patient need discharge transport arranged? No   Financial Resource Strain   How hard is it for you to pay for the very basics like food, housing, medical care, and heating? Not hard   Housing Stability   In the last 12 months, was there a time when you were not able to pay the mortgage or rent on time? N   In the past 12 months, how many times have you moved where you were living? 0   At any time in the past 12 months, were you homeless or living in a shelter (including now)? N   Transportation Needs   In the past 12 months, has lack of transportation kept you from medical appointments or from getting medications? no   In the past 12 months, has lack of transportation kept you from meetings, work, or from getting things needed for daily living? No   Patient Choice   Patient / Family choosing to utilize agency / facility established prior to hospitalization No                                 10/03/24 1512   Discharge Planning   Living Arrangements Spouse/significant other   Support Systems Spouse/significant other   Type of Residence Private residence   Number of Stairs to Enter Residence 0   Number of Stairs Within Residence 0   Do you have animals or pets at home? No   Who is requesting discharge planning? Provider   Home or Post Acute Services None   Expected Discharge Disposition Home   Does the patient need discharge transport arranged? No   Financial Resource Strain   How hard is it for you to pay for the very basics like food, housing, medical care, and heating? Not hard   Housing Stability   In the  last 12 months, was there a time when you were not able to pay the mortgage or rent on time? N   In the past 12 months, how many times have you moved where you were living? 0   At any time in the past 12 months, were you homeless or living in a shelter (including now)? N   Transportation Needs   In the past 12 months, has lack of transportation kept you from medical appointments or from getting medications? no   In the past 12 months, has lack of transportation kept you from meetings, work, or from getting things needed for daily living? No   Patient Choice   Patient / Family choosing to utilize agency / facility established prior to hospitalization No

## 2024-10-03 NOTE — ED PROVIDER NOTES
EMERGENCY DEPARTMENT ENCOUNTER      Pt Name: Keisha Baxter  MRN: [id number]  Birthdate 1940  Date of evaluation: 10/3/2024  ED Provider: Swati Terrazas DO     CHIEF COMPLAINT       Chief Complaint   Patient presents with    Cerebrovascular Accident       HISTORY OF PRESENT ILLNESS    Keisha Baxter is a 84 y.o. who presents to the emergency department with complaints of dizziness and hypertension.  She states this occurred suddenly while getting up and watching her coffee cup.  She was preparing to go rollerskating with her .  She has never had anything like this previously.  EMS arrived and noted that her symptoms are worse upon standing however her blood pressure was significantly elevated.  She denies a history of hypertension and is on no antihypertensive medications per her report.  Shortly after arrival to the emergency department the patient complained of numbness and tingling to the left upper and left lower extremity and there was nursing concern for possible facial droop.  Patient complains of minimal headache.  No chest pain or difficulty breathing.  No other acute complaints.    REVIEW OF SYSTEMS     Focused ROS performed and negative other than as listed in HPI    PAST MEDICAL HISTORY     Past Medical History:   Diagnosis Date    Arthritis     Disease of thyroid gland     Hyperlipidemia        SURGICAL HISTORY       Past Surgical History:   Procedure Laterality Date    TOTAL HIP ARTHROPLASTY Bilateral 2019       CURRENT MEDICATIONS       Previous Medications    CHLORHEXIDINE (PERIDEX) 0.12 % SOLUTION    Use as directed    CHOLECALCIFEROL (VITAMIN D-3) 5,000 UNITS TABLET    Take 1 tablet (5,000 Units) by mouth once daily.    CYANOCOBALAMIN, VITAMIN B-12, 5,000 MCG TABLET, SUBLINGUAL    Place under the tongue.    DESMOPRESSIN (DDAVP) 0.2 MG TABLET    Take 1 tablet (0.2 mg) by mouth once daily.    LEVOTHYROXINE (SYNTHROID, LEVOXYL) 100 MCG TABLET    Take 1 tablet (100 mcg) by mouth once  daily in the morning. Take before meals.    MELOXICAM (MOBIC) 15 MG TABLET    Take 1 tablet (15 mg) by mouth early in the morning..    SIMVASTATIN (ZOCOR) 40 MG TABLET    Take 1 tablet (40 mg) by mouth once daily at bedtime.       ALLERGIES     Penicillins and Shellfish derived    FAMILY HISTORY     No family history on file.     SOCIAL HISTORY       Social History     Socioeconomic History    Marital status:    Tobacco Use    Smoking status: Former     Current packs/day: 0.00     Average packs/day: 1 pack/day for 48.0 years (48.0 ttl pk-yrs)     Types: Cigarettes     Start date:      Quit date:      Years since quittin.7    Smokeless tobacco: Never   Vaping Use    Vaping status: Never Used   Substance and Sexual Activity    Alcohol use: Yes     Alcohol/week: 3.0 standard drinks of alcohol     Types: 3 Glasses of wine per week     Comment: 3 glasses of wine per week    Drug use: Never     Social Determinants of Health     Financial Resource Strain: Low Risk  (10/20/2020)    Received from [facility]    Overall Financial Resource Strain (CARDIA)     Difficulty of Paying Living Expenses: Not hard at all   Food Insecurity: Unknown (10/20/2020)    Received from [facility]    Hunger Vital Sign     Worried About Running Out of Food in the Last Year: Patient declined     Ran Out of Food in the Last Year: Patient declined   Transportation Needs: Unknown (10/20/2020)    Received from [facility]    PRAPARE - Transportation     Lack of Transportation (Medical): Patient declined     Lack of Transportation (Non-Medical): Patient declined       PHYSICAL EXAM       ED Triage Vitals [10/03/24 1011]   Temperature Heart Rate Respirations BP   36.9 °C (98.4 °F) 82 16 (!) 242/121      Pulse Ox Temp Source Heart Rate Source Patient Position   98 % Temporal Monitor Sitting      BP Location FiO2 (%)     Left arm --        General: Appears in no  acute distress, alert  Head: Head atraumatic; normocephalic  Eyes: normal inspection; no icterus  ENT: mucosa moist without lesion  Neck: Normal inspection, no meningeal signs  Resp: Normal breath sounds, no wheeze or crackles; No respiratory distress  Chest Wall: no tenderness or deformity  Heart: Heart rate and rhythm regular; No Murmurs  Abdomen: Soft, Non-tender; No distention, guarding, rigidity, or rebound  MSK: Normal appearance; Moves all extremities; No Pedal edema  Neuro: AxOx3; CNII-XII intact; sensation intact to upper and lower extremities bilaterally; finger-nose test normal; no nystagmus;  speech normal, no dysphasia or aphasia, NIH 2 for L leg drift and numbness/paresthesias to LLE/LUE  Psych: Mood and Affect normal  Skin: Color appropriate; warm; Dry    DIAGNOSTIC RESULTS   Lab and radiology results are independently interpreted unless noted below.  RADIOLOGY (Per Emergency Physician):     Interpretation per the Radiologist below, if available at the time of this note:  CT brain attack head wo IV contrast   Final Result   No acute intracranial bleed or focal mass effect.        Mild-to-moderate volume loss.        Mild chronic white matter ischemic disease in the deep   periventricular regions.        MACRO:   Dash Packer discussed the significance and urgency of this critical   finding by epic secure chat with  MARIA DEL CARMEN ALFARO on 10/3/2024 at 10:33   am.  (**-RCF-**) Findings:  See findings.        Signed by: Dash Packer 10/3/2024 10:33 AM   Dictation workstation:   [id number]          LABS:  Abnormal Labs Reviewed   CBC WITH AUTO DIFFERENTIAL - Abnormal; Notable for the following components:       Result Value    Hematocrit 46.7 (*)     Platelets 147 (*)     All other components within normal limits   COMPREHENSIVE METABOLIC PANEL - Abnormal; Notable for the following components:    Glucose 123 (*)     Sodium 132 (*)     All other components within normal limits   POCT GLUCOSE - Abnormal; Notable  for the following components:    POCT Glucose 140 (*)     All other components within normal limits       All other labs were within normal range or not returned as of this dictation.    EKG:  Personally interpreted by Swati Terrazas DO  1029: Normal sinus rhythm with left ventricular hypertrophy significant left axis deviation ventricular rate 78 normal intervals no acute ischemic changes    EMERGENCY DEPARTMENT COURSE/MDM   Patient presents with dizziness that turned into paresthesias and slight left leg weakness.  Given this brain attack was called.  Last known well was approximately 9 AM.  Patient taken over to CT scan and nicardipine will be initiated to allow for titration and slow decrease in blood pressure.    ED Course as of 10/03/24 1413   Thu Oct 03, 2024   1027 CT of the head is personally reviewed and demonstrates no evidence of acute bleed.  Consult placed to stroke neurology and awaiting formal radiology read. [EF]   1033 Spoke with Dr Saenz, [facility], she agreed with IV CCB for hypertension. No indication for thrombolytics. VAN negative. [EF]   1330 Patient is blood pressure improved with nifedipine and eventually she was titrated off.  Evaluated by the ICU team and was deemed stable for the floor. [EF]   1357 Cased discussed with hospitalist, accepted to the SDU in stable condition. BP remains elevate but more appropriate. No need for further gtt at this time.  [EF]      ED Course User Index  [EF] Swati Terrazas DO         Diagnoses as of 10/03/24 1413   Dizziness   Hypertensive emergency   Cerebrovascular accident (CVA), unspecified mechanism (Multi)       Meds Administered:  Medications   niCARdipine (Cardene) 40 mg in sodium chloride 200 mL (0.2 mg/mL) infusion (premix) (2.5 mg/hr intravenous New Bag 10/3/24 1020)   aspirin tablet 325 mg (325 mg oral Not Given 10/3/24 1021)       PROCEDURES   Unless otherwise noted below, none  Procedures      FINAL IMPRESSION      1. Hypertensive emergency    2.  Dizziness    3. Cerebrovascular accident (CVA), unspecified mechanism (Multi)          DISPOSITION    Admit 10/03/2024 01:56:59 PM    Critical Care Time   TOTAL CRITICAL CARE TIME (EXCLUDING SEPARATE BILLABLE PROCEDURES): 42 min  CC time assessed for complex medical decision making, coordination of care between providers and specialists, discussion with family (if patient unable to contribute), documentation of findings, and interpretation of labwork and imaging.      (Comment: Please note this report has been produced using speech recognition software and may contain errors related to that system including errors in grammar, punctuation, and spelling, as well as words and phrases that may be inappropriate.  If there are any questions or concerns please feel free to contact the dictating provider for clarification.)    Swati Terrazas DO (electronically signed)  Emergency Medicine Physician    History Limited by: None  Independent history obtained from: EMS  External records reviewed: None  Diagnostics interpreted by me: CT Scan(s) no ICH and EKG - see my independent interpretation elsewhere in the chart  Discussions with other clinicians: Hospitalist/Admitting Team ICU team, hospitalist and Neurologist Dr Saenz  Chronic conditions impacting care:  HLD  Social determinants of health affecting care: None  Diagnostic tests considered but not performed: CTA head/neck  ED Medications managed:  Medications   niCARdipine (Cardene) 40 mg in sodium chloride 200 mL (0.2 mg/mL) infusion (premix) (2.5 mg/hr intravenous New Bag 10/3/24 1020)   aspirin tablet 325 mg (325 mg oral Not Given 10/3/24 1021)       Prescription drugs considered: None             Swati Terrazas DO  10/03/24 1106

## 2024-10-03 NOTE — TELECONSULT
HPI: Telestroke consult at 10?25 am  84 y.o. female presenting with dizziness and L hemiparesthesias in setting of hypertensive urgency.     NIH Stroke Scale Reported: 0-1     Imaging Results:  CT Head: microvascular changes and volume loss; no acute findings    Assessment:   Working Diagnosis: TIA/ Ischemic Stroke/ ischemic symptoms due to hypertensive vasospasm       Recommendations:   IV tNK is not recommended/ Patient is NOT a candidate for endovascular treatment/ Rationale low NIH      Additional Recommendations: Admit for neurologic monitoring, evaluation and management.   Stroke best practices- Swallow eval prior to any PO.  Initiate DVT prophylaxis  Risk factor control- Initial permissive hypertension with serial assessments then gradual reduction by increments of 10-15% as tolerated to ultimate goal <130/80, Intensive statin to goal LDL < 70, Glucose < 180 mg/dl, smoking cessation.   Antiplatelet therapy for prevention until evaluation complete to determine best therapy.     Lana Saenz MD  Consult completed by: TELEPHONE communication was used to provide this telehealth service.  Time includes consultation with ED provider and extensive review of data- history, medical records, test results, and neuroimaging studies: 5 - 10 mins was spent in consultation

## 2024-10-03 NOTE — CARE PLAN
Pt has a POA, she said that it is her  Jean--document not on file  ADOD: 2days    Pt lives at home with her  in a 1 floor home with 0 steps to climb to enter the home  She is able to drive, cook, clean; she and her  go golfing and they go out with other couples. Very active  She does not use home 02/cpap/bipap; no assistive device needed when ambulating  Pt denies depression and anxiety, No S.I.  She wears reading glasses, no hearing aids. She said that she is able to read and comprehend what she reads.  Her PCP is Dr. Ange Frey from ; she uses [facility] for her scripts and she can afford her meds  Pt is here for hypertension and dizziness  No anticipated discharge needs    DISCHARGE PLAN: HOME WITH

## 2024-10-03 NOTE — ED TRIAGE NOTES
0930 dizziness, left arm numbness upon arrival, Aox4, left sided facial droop, left sided weakness, brain attack at 1006, 140 glucose, hypertension

## 2024-10-03 NOTE — H&P
History Of Present Illness  Keisha Baxter is a 84 y.o. female presenting with Complaint of dizziness Not feeling good.    This is an 84 years old female with past medical history of hyperlipidemia and hypothyroidism who was presented to the ER due to dizziness not feeling good.  Patient did not fall just feeling some numbness and tingling on her left side including left hand left leg and left side of her lips.  Patient said that she never felt this way.  No history of hypertension no history of stroke or TIA.  Not on any blood thinner.  Patient's denies any chest or shortness of breath in the room air no palpitation.  No fever or chills.  No recent illness or flu symptoms.  She was having some nausea but no vomiting or abdominal pain.  She was also concerning of minimal headache.  Able to move all her extremities without any limitation.      Past Medical History  Past Medical History:   Diagnosis Date    Arthritis     Disease of thyroid gland     Hyperlipidemia        Surgical History  Past Surgical History:   Procedure Laterality Date    COLOSTOMY      HERNIA REPAIR      TOTAL HIP ARTHROPLASTY Bilateral 2019        Social History  She reports that she quit smoking about 20 years ago. Her smoking use included cigarettes. She started smoking about 68 years ago. She has a 48 pack-year smoking history. She has never used smokeless tobacco. She reports current alcohol use of about 3.0 standard drinks of alcohol per week. She reports that she does not use drugs.    Family History  No family history on file.     Allergies  Penicillins and Shellfish derived    Review of Systems   Constitutional:  Positive for activity change. Negative for appetite change, chills, diaphoresis, fatigue, fever and unexpected weight change.   HENT:  Negative for congestion, rhinorrhea, sinus pain, sore throat, trouble swallowing and voice change.    Eyes: Negative.    Respiratory:  Negative for apnea, cough, choking, chest tightness,  "shortness of breath and wheezing.    Cardiovascular:  Negative for chest pain, palpitations and leg swelling.   Gastrointestinal:  Positive for nausea. Negative for abdominal distention, abdominal pain, blood in stool, constipation, diarrhea and vomiting.   Endocrine: Negative.    Genitourinary:  Negative for decreased urine volume, difficulty urinating and hematuria.   Musculoskeletal:  Positive for myalgias. Negative for arthralgias, back pain, neck pain and neck stiffness.   Skin: Negative.    Neurological:  Positive for dizziness, weakness, light-headedness and numbness. Negative for tremors, seizures, syncope, facial asymmetry, speech difficulty and headaches.   Hematological: Negative.    Psychiatric/Behavioral: Negative.          Physical Exam  Vitals and nursing note reviewed.   Constitutional:       Appearance: Normal appearance. She is normal weight.   HENT:      Nose: Nose normal. No congestion or rhinorrhea.      Mouth/Throat:      Mouth: Mucous membranes are moist.   Eyes:      Extraocular Movements: Extraocular movements intact.      Pupils: Pupils are equal, round, and reactive to light.   Cardiovascular:      Rate and Rhythm: Normal rate.      Pulses: Normal pulses.   Pulmonary:      Effort: Pulmonary effort is normal.      Breath sounds: Normal breath sounds.   Abdominal:      General: Bowel sounds are normal.      Palpations: Abdomen is soft.   Musculoskeletal:      Cervical back: Normal range of motion and neck supple.   Skin:     General: Skin is warm.   Neurological:      General: No focal deficit present.      Mental Status: She is alert and oriented to person, place, and time.   Psychiatric:         Mood and Affect: Mood normal.         Behavior: Behavior normal.          Last Recorded Vitals  Blood pressure 180/84, pulse 80, temperature 36.9 °C (98.4 °F), temperature source Temporal, resp. rate (!) 21, height 1.676 m (5' 6\"), weight 73.5 kg (162 lb), SpO2 95%.    Relevant Results           "   Assessment/Plan   Assessment & Plan  Dizziness  Ortho vitals  Could be from multiple factor elevated blood pressure  Acquired hypothyroidism  Patient on Synthroid continue with home dose 100 micro milligram  65  Mixed hyperlipidemia  Patient on simvastatin 40 mg will continue  Lipid panel in a.m.  Left sided numbness  Left-sided numbness and tingling possible TIA  See below  Chronic pain of right knee  Patient supposed to have a surgery on October 15 on her right knee  Keep pain under control  TIA (transient ischemic attack)  Patient has tingling numbness on left side, addition light headache  MRI MRI ultrasound of carotid  Order D echo  Consult neuro  Neurocheck every shift and if needed  Fall precautions and aspiration precaution  Elevated blood pressure reading without diagnosis of hypertension  Patient's blood pressure was around 180 we will keep blood pressure high due to possible TIA  Order hydralazine if systolic blood pressure above 220  Consider to start patient on oral blood pressure meds if remains high after 24 hours    DVT prophylaxis  On heparin subcutaneous      I spent 65 minutes in the professional and overall care of this patient.      Aparna Garcia, APRN-CNP

## 2024-10-03 NOTE — PROGRESS NOTES
Pharmacy Medication History Review    Keisha Baxter is a 84 y.o. female admitted for Dizziness. Pharmacy reviewed the patient's [id number] medications and allergies for accuracy.    Medications ADDED:  none  Medications CHANGED:  Chlorheidine 0.12% solution - not taking - for future procedure  Medications REMOVED:   None      The list below reflects the updated PTA list. Comments regarding how patient may be taking medications differently can be found in the Admit Orders Activity  Prior to Admission Medications   Prescriptions Last Dose Informant   cholecalciferol (Vitamin D-3) 5,000 Units tablet 10/2/2024 self   Sig: Take 1 tablet (5,000 Units) by mouth once daily.   cyanocobalamin, vitamin B-12, 5,000 mcg tablet, sublingual 10/2/2024 self   Sig: Place under the tongue.   desmopressin (DDAVP) 0.2 mg tablet 10/2/2024 self   Sig: Take 1 tablet (0.2 mg) by mouth once daily.   levothyroxine (Synthroid, Levoxyl) 100 mcg tablet 10/3/2024 self   Sig: Take 1 tablet (100 mcg) by mouth once daily in the morning. Take before meals.   meloxicam (Mobic) 15 mg tablet 10/2/2024 self   Sig: Take 1 tablet (15 mg) by mouth early in the morning..   simvastatin (Zocor) 40 mg tablet 10/2/2024 self   Sig: Take 1 tablet (40 mg) by mouth once daily at bedtime.      Facility-Administered Medications: None        The list below reflects the updated allergy list. Please review each documented allergy for additional clarification and justification.  Allergies  Reviewed by Moira Luis CPhT on 10/3/2024        Severity Reactions Comments    Penicillins Medium Hives, Unknown hives    Shellfish Derived Medium Hives             Pharmacy has been updated to [facility].    Sources used to complete the med history include dispense history, PTA medication list, patient interview. Patient is a fair historian.    Below are additional concerns with the patient's PTA list.  None     Moira Luis CPhT-Adv  Please reach  out via [employer] Secure Chat for questions

## 2024-10-03 NOTE — HOSPITAL COURSE
21959/1     Luz Jimenes MRN: [id number]  AGE: 85 year old  ADMIT DATE: 9/2/2022    CODE STATUS: Full Resuscitation  ISOLATION STATUS: No active isolations   DIET: Cardiac Diet    ALLERGIES:  Morphine     DX:Hyponatremia  (primary encounter diagnosis)  Dizziness     Att: Madeleine Koroma MD  PCP: Zofia Nuno MD          BP: (!) 166/52  Temp: 98.6 °F (37 °C)  Temp src: Oral  Heart Rate: 61  Resp: 15  SpO2: 94 %  Height: 5' 6\" (167.6 cm)  Weight: 66 kg (145 lb 8.1 oz)   Weight change:      No results available in last 24 hours     Creatinine (mg/dL)   Date Value   09/03/2022 0.76   09/02/2022 0.73     WBC (K/mcL)   Date Value   09/03/2022 5.6   09/02/2022 6.6        I/O last 3 completed shifts:  In: 546 [P.O.:150; I.V.:396]  Out: -                          IMPORTANT EVENTS THIS SHIFT:  Orthostatic Vitals taken prior to ambulating patient to bathroom and noted significant drop of SBP laying/in supine to standing.Denied dizziness.No significant effect on HR.Dr Ybarra informed.Safety measures suggested to patient,Patient insisted on ambulating to bathroom on her own. IMPORTANT EVENTS COMING UP/GOALS (PLEASE INCLUDE WHITE BOARD AND DISCHARGE BOARD UPDATES):       PATIENT SPECIAL NEEDS/ACCOMMODATIONS:                                "This is an 84 years old female with past medical history of hyperlipidemia and hypothyroidism who presented to the ER due to complaint of dizziness, not feeling good, and numbness with tingling on  the left side.  She was admitted for evaluation of TIA and hypertension emergency.  Evaluated by Dr. Emerson patient will discharge with Plavix and aspirin.  Patient has to follow-up with her as outpatient.  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  Patient has an appointment with her primary doctor on Monday she will follow-up with her blood pressure meds which is new for her.  Patient is hemodynamically stable.  No need of physical therapy at home   "

## 2024-10-04 ENCOUNTER — PHARMACY VISIT (OUTPATIENT)
Dept: PHARMACY | Facility: CLINIC | Age: 84
End: 2024-10-04
Payer: MEDICARE

## 2024-10-04 ENCOUNTER — APPOINTMENT (OUTPATIENT)
Dept: RADIOLOGY | Facility: HOSPITAL | Age: 84
End: 2024-10-04
Payer: MEDICARE

## 2024-10-04 ENCOUNTER — APPOINTMENT (OUTPATIENT)
Dept: CARDIOLOGY | Facility: HOSPITAL | Age: 84
End: 2024-10-04
Payer: MEDICARE

## 2024-10-04 VITALS
OXYGEN SATURATION: 96 % | DIASTOLIC BLOOD PRESSURE: 95 MMHG | TEMPERATURE: 96.8 F | SYSTOLIC BLOOD PRESSURE: 168 MMHG | BODY MASS INDEX: 27 KG/M2 | HEIGHT: 66 IN | HEART RATE: 79 BPM | RESPIRATION RATE: 20 BRPM | WEIGHT: 168 LBS

## 2024-10-04 PROBLEM — R03.0 ELEVATED BLOOD PRESSURE READING WITHOUT DIAGNOSIS OF HYPERTENSION: Status: RESOLVED | Noted: 2024-10-03 | Resolved: 2024-10-04

## 2024-10-04 PROBLEM — G45.9 TIA (TRANSIENT ISCHEMIC ATTACK): Status: RESOLVED | Noted: 2024-10-03 | Resolved: 2024-10-04

## 2024-10-04 PROBLEM — M25.561 CHRONIC PAIN OF RIGHT KNEE: Status: RESOLVED | Noted: 2024-10-03 | Resolved: 2024-10-04

## 2024-10-04 PROBLEM — G89.29 CHRONIC PAIN OF RIGHT KNEE: Status: RESOLVED | Noted: 2024-10-03 | Resolved: 2024-10-04

## 2024-10-04 PROBLEM — R42 DIZZINESS: Status: RESOLVED | Noted: 2024-10-03 | Resolved: 2024-10-04

## 2024-10-04 PROBLEM — E03.9 ACQUIRED HYPOTHYROIDISM: Status: RESOLVED | Noted: 2019-12-06 | Resolved: 2024-10-04

## 2024-10-04 PROBLEM — E78.2 MIXED HYPERLIPIDEMIA: Status: RESOLVED | Noted: 2019-12-06 | Resolved: 2024-10-04

## 2024-10-04 PROBLEM — R20.0 LEFT SIDED NUMBNESS: Status: RESOLVED | Noted: 2024-10-03 | Resolved: 2024-10-04

## 2024-10-04 LAB
ALBUMIN SERPL BCP-MCNC: 4.3 G/DL (ref 3.4–5)
ALP SERPL-CCNC: 62 U/L (ref 33–136)
ALT SERPL W P-5'-P-CCNC: 16 U/L (ref 7–45)
ANION GAP SERPL CALCULATED.3IONS-SCNC: 12 MMOL/L (ref 10–20)
AORTIC VALVE MEAN GRADIENT: 6.5 MMHG
AORTIC VALVE PEAK VELOCITY: 1.77 M/S
AST SERPL W P-5'-P-CCNC: 19 U/L (ref 9–39)
AV PEAK GRADIENT: 12.6 MMHG
AVA (PEAK VEL): 1.57 CM2
AVA (VTI): 1.98 CM2
BILIRUB DIRECT SERPL-MCNC: 0.1 MG/DL (ref 0–0.3)
BILIRUB SERPL-MCNC: 0.7 MG/DL (ref 0–1.2)
BNP SERPL-MCNC: 65 PG/ML (ref 0–99)
BUN SERPL-MCNC: 16 MG/DL (ref 6–23)
CALCIUM SERPL-MCNC: 9.2 MG/DL (ref 8.6–10.3)
CHLORIDE SERPL-SCNC: 104 MMOL/L (ref 98–107)
CHOLEST SERPL-MCNC: 159 MG/DL (ref 0–199)
CHOLEST/HDLC SERPL: 3.2 {RATIO}
CO2 SERPL-SCNC: 24 MMOL/L (ref 21–32)
CREAT SERPL-MCNC: 0.61 MG/DL (ref 0.5–1.05)
EGFRCR SERPLBLD CKD-EPI 2021: 88 ML/MIN/1.73M*2
EJECTION FRACTION APICAL 4 CHAMBER: 69.5
EJECTION FRACTION: 58 %
ERYTHROCYTE [DISTWIDTH] IN BLOOD BY AUTOMATED COUNT: 12.7 % (ref 11.5–14.5)
EST. AVERAGE GLUCOSE BLD GHB EST-MCNC: 117 MG/DL
GLUCOSE SERPL-MCNC: 118 MG/DL (ref 74–99)
HBA1C MFR BLD: 5.7 %
HCT VFR BLD AUTO: 44.5 % (ref 36–46)
HDLC SERPL-MCNC: 50.2 MG/DL
HGB BLD-MCNC: 14.9 G/DL (ref 12–16)
LDLC SERPL CALC-MCNC: 82 MG/DL
LEFT ATRIUM VOLUME AREA LENGTH INDEX BSA: 29.3 ML/M2
LEFT VENTRICLE INTERNAL DIMENSION DIASTOLE: 2.53 CM (ref 3.5–6)
LEFT VENTRICULAR OUTFLOW TRACT DIAMETER: 2.06 CM
LV EJECTION FRACTION BIPLANE: 70 %
MCH RBC QN AUTO: 30.2 PG (ref 26–34)
MCHC RBC AUTO-ENTMCNC: 33.5 G/DL (ref 32–36)
MCV RBC AUTO: 90 FL (ref 80–100)
MITRAL VALVE E/A RATIO: 0.56
MITRAL VALVE E/E' RATIO: 13.98
NON HDL CHOLESTEROL: 109 MG/DL (ref 0–149)
NRBC BLD-RTO: 0 /100 WBCS (ref 0–0)
PLATELET # BLD AUTO: 151 X10*3/UL (ref 150–450)
POTASSIUM SERPL-SCNC: 4 MMOL/L (ref 3.5–5.3)
PROT SERPL-MCNC: 7.2 G/DL (ref 6.4–8.2)
RBC # BLD AUTO: 4.93 X10*6/UL (ref 4–5.2)
RIGHT VENTRICLE FREE WALL PEAK S': 14.58 CM/S
RIGHT VENTRICLE PEAK SYSTOLIC PRESSURE: 27.5 MMHG
SODIUM SERPL-SCNC: 136 MMOL/L (ref 136–145)
TRICUSPID ANNULAR PLANE SYSTOLIC EXCURSION: 1.9 CM
TRIGL SERPL-MCNC: 132 MG/DL (ref 0–149)
VLDL: 26 MG/DL (ref 0–40)
WBC # BLD AUTO: 7.2 X10*3/UL (ref 4.4–11.3)

## 2024-10-04 PROCEDURE — 96375 TX/PRO/DX INJ NEW DRUG ADDON: CPT

## 2024-10-04 PROCEDURE — 2500000004 HC RX 250 GENERAL PHARMACY W/ HCPCS (ALT 636 FOR OP/ED): Performed by: NURSE PRACTITIONER

## 2024-10-04 PROCEDURE — 97161 PT EVAL LOW COMPLEX 20 MIN: CPT | Mod: GP

## 2024-10-04 PROCEDURE — 97535 SELF CARE MNGMENT TRAINING: CPT | Mod: GO

## 2024-10-04 PROCEDURE — 70498 CT ANGIOGRAPHY NECK: CPT | Performed by: RADIOLOGY

## 2024-10-04 PROCEDURE — 85027 COMPLETE CBC AUTOMATED: CPT | Performed by: NURSE PRACTITIONER

## 2024-10-04 PROCEDURE — 70498 CT ANGIOGRAPHY NECK: CPT

## 2024-10-04 PROCEDURE — 97165 OT EVAL LOW COMPLEX 30 MIN: CPT | Mod: GO

## 2024-10-04 PROCEDURE — 84075 ASSAY ALKALINE PHOSPHATASE: CPT | Performed by: NURSE PRACTITIONER

## 2024-10-04 PROCEDURE — G0378 HOSPITAL OBSERVATION PER HR: HCPCS

## 2024-10-04 PROCEDURE — 36415 COLL VENOUS BLD VENIPUNCTURE: CPT | Performed by: NURSE PRACTITIONER

## 2024-10-04 PROCEDURE — 93306 TTE W/DOPPLER COMPLETE: CPT

## 2024-10-04 PROCEDURE — 93306 TTE W/DOPPLER COMPLETE: CPT | Performed by: INTERNAL MEDICINE

## 2024-10-04 PROCEDURE — RXMED WILLOW AMBULATORY MEDICATION CHARGE

## 2024-10-04 PROCEDURE — 83880 ASSAY OF NATRIURETIC PEPTIDE: CPT | Performed by: NURSE PRACTITIONER

## 2024-10-04 PROCEDURE — 96372 THER/PROPH/DIAG INJ SC/IM: CPT | Performed by: NURSE PRACTITIONER

## 2024-10-04 PROCEDURE — 83036 HEMOGLOBIN GLYCOSYLATED A1C: CPT | Mod: WESLAB | Performed by: NURSE PRACTITIONER

## 2024-10-04 PROCEDURE — 2550000001 HC RX 255 CONTRASTS: Performed by: INTERNAL MEDICINE

## 2024-10-04 PROCEDURE — 80061 LIPID PANEL: CPT | Performed by: NURSE PRACTITIONER

## 2024-10-04 PROCEDURE — 2500000001 HC RX 250 WO HCPCS SELF ADMINISTERED DRUGS (ALT 637 FOR MEDICARE OP): Performed by: NURSE PRACTITIONER

## 2024-10-04 PROCEDURE — 82248 BILIRUBIN DIRECT: CPT | Performed by: NURSE PRACTITIONER

## 2024-10-04 RX ORDER — LISINOPRIL 10 MG/1
10 TABLET ORAL DAILY
Qty: 30 TABLET | Refills: 0 | Status: SHIPPED | OUTPATIENT
Start: 2024-10-04 | End: 2024-11-03

## 2024-10-04 RX ORDER — CLOPIDOGREL BISULFATE 75 MG/1
75 TABLET ORAL DAILY
Qty: 30 TABLET | Refills: 0 | Status: SHIPPED | OUTPATIENT
Start: 2024-10-04 | End: 2024-11-03

## 2024-10-04 RX ORDER — CLOPIDOGREL BISULFATE 75 MG/1
75 TABLET ORAL DAILY
Status: DISCONTINUED | OUTPATIENT
Start: 2024-10-04 | End: 2024-10-04 | Stop reason: HOSPADM

## 2024-10-04 RX ORDER — ASPIRIN 81 MG/1
81 TABLET ORAL DAILY
Qty: 30 TABLET | Refills: 0 | Status: SHIPPED | OUTPATIENT
Start: 2024-10-05 | End: 2024-10-04 | Stop reason: HOSPADM

## 2024-10-04 RX ORDER — LISINOPRIL 10 MG/1
10 TABLET ORAL DAILY
Status: DISCONTINUED | OUTPATIENT
Start: 2024-10-04 | End: 2024-10-04 | Stop reason: HOSPADM

## 2024-10-04 ASSESSMENT — PAIN - FUNCTIONAL ASSESSMENT
PAIN_FUNCTIONAL_ASSESSMENT: 0-10

## 2024-10-04 ASSESSMENT — PAIN SCALES - GENERAL
PAINLEVEL_OUTOF10: 0 - NO PAIN

## 2024-10-04 ASSESSMENT — COGNITIVE AND FUNCTIONAL STATUS - GENERAL
STANDING UP FROM CHAIR USING ARMS: A LITTLE
TOILETING: A LITTLE
CLIMB 3 TO 5 STEPS WITH RAILING: A LOT
WALKING IN HOSPITAL ROOM: A LOT
MOBILITY SCORE: 18
HELP NEEDED FOR BATHING: A LITTLE
PERSONAL GROOMING: A LITTLE
DRESSING REGULAR UPPER BODY CLOTHING: A LITTLE
MOVING TO AND FROM BED TO CHAIR: A LITTLE
DAILY ACTIVITIY SCORE: 19
DRESSING REGULAR LOWER BODY CLOTHING: A LITTLE

## 2024-10-04 ASSESSMENT — ACTIVITIES OF DAILY LIVING (ADL)
HOME_MANAGEMENT_TIME_ENTRY: 13
BATHING_ASSISTANCE: NOT PERFORMED
ADL_ASSISTANCE: INDEPENDENT
ADL_ASSISTANCE: INDEPENDENT

## 2024-10-04 NOTE — PROGRESS NOTES
Physical Therapy    Physical Therapy Evaluation    Patient Name: Keisha Baxter  MRN: [id number]  Department: [address]  Room: 09/09-B  Today's Date: 10/4/2024   Time Calculation  Start Time: 0910  Stop Time: 0925  Time Calculation (min): 15 min    Assessment/Plan   PT Assessment  PT Assessment Results: Decreased strength, Decreased endurance, Impaired balance, Decreased mobility  Rehab Prognosis: Good  Evaluation/Treatment Tolerance: Patient tolerated treatment well  Medical Staff Made Aware: Yes  Strengths: Ability to acquire knowledge  End of Session Communication: Bedside nurse  Assessment Comment: patient presents with impaired functional mobility, including decreased BLE strength, impaired balance, decreased tolerance to activity. pt requires supervision-min/mod A for ambulation d/t LOB. educated on use of cane for homegoing.  End of Session Patient Position: Up in chair, Alarm on  IP OR SWING BED PT PLAN  Inpatient or Swing Bed: Inpatient  PT Plan  Treatment/Interventions: Bed mobility, Transfer training, Stair training, Gait training, Balance training, Strengthening, Endurance training, Therapeutic activity, Therapeutic exercise  PT Plan: Ongoing PT  PT Frequency: 5 times per week  PT Discharge Recommendations: Low intensity level of continued care  Equipment Recommended upon Discharge: Straight cane  PT Recommended Transfer Status: Assist x1  PT - OK to Discharge: Yes      Subjective   General Visit Information:  General  Reason for Referral: PT eval and treat; 83 y/o female admit from home with dizziness.  Referred By: HECTOR Alvarado  Past Medical History Relevant to Rehab: B REBEKAH, shingles, vit D deficiency, R knee OA (scheduled for TKA on 10/15/24)  Prior to Session Communication: Bedside nurse  Patient Position Received: Up in chair, Alarm on  General Comment: patient cleared for therapy by nursing, seated in chair upon arrival and agreeable to therapy.  Home Living:  Home Living  Type of Home:  House  Lives With: Spouse  Home Adaptive Equipment: Cane, Walker rolling or standard, Crutches, Reacher  Home Layout: One level  Home Access: Level entry  Prior Level of Function:  Prior Function Per Pt/Caregiver Report  Level of [place]: Independent with ADLs and functional transfers, Independent with homemaking with ambulation  Receives Help From: Family  ADL Assistance: Independent  Homemaking Assistance: Independent  Ambulatory Assistance: Independent (intermittently reaches for furniture d/t R knee OA, scheduled for TKA on 10/15/24)  Vocational: Retired  Leisure: [employer], [employer]  Prior Function Comments: denies falls  Precautions:  Precautions  Medical Precautions: Fall precautions    Objective   Pain:  Pain Assessment  Pain Assessment: 0-10  Cognition:  Cognition  Overall Cognitive Status: Within Functional Limits  Orientation Level: Oriented X4    General Assessments:  General Observation  General Observation: alert, NAD. no LOB.       Activity Tolerance  Endurance: Decreased tolerance for upright activites    Sensation  Light Touch: No apparent deficits  Sensation Comment: denies paresthesias BLEs, reports mild paresthesias in L face    Strength  Strength Comments: BLE grossly >4/5  Strength  Strength Comments: BLE grossly >4/5    Coordination  Movements are Fluid and Coordinated: Yes  Coordination Comment: WFL    Postural Control  Postural Control: Within Functional Limits  Posture Comment: forward head posture, rounded shoulders    Functional Assessments:  Bed Mobility  Bed Mobility: No    Transfers  Transfer:  (supervision sit <> stand from chair for safety, denies s/s.  good eccentric control into chair.)    Ambulation/Gait Training  Ambulation/Gait Training Performed:  (pt ambulated ~40'x2 without AD, supervision for safety with one major LOB to the left requiring min-mod A to correct.  denies s/s.  demonstrates decreased esau, reciprocal gait pattern.  educated on use of cane for homegoing  d/t reaching for furniture)  Extremity/Trunk Assessments:  RLE   RLE : Within Functional Limits  LLE   LLE : Within Functional Limits  Outcome Measures:  [facility] Basic Mobility  Turning from your back to your side while in a flat bed without using bedrails: None  Moving from lying on your back to sitting on the side of a flat bed without using bedrails: None  Moving to and from bed to chair (including a wheelchair): A little  Standing up from a chair using your arms (e.g. wheelchair or bedside chair): A little  To walk in hospital room: A lot  Climbing 3-5 steps with railing: A lot  Basic Mobility - Total Score: 18    Encounter Problems       Encounter Problems (Active)       PT Problem       Patient will transfer supine <> sit independently        Start:  10/04/24    Expected End:  10/25/24            Patient will transfer sit <> stand independently        Start:  10/04/24    Expected End:  10/25/24            Patient will ambulate 150 ft modified independently and use of cane        Start:  10/04/24    Expected End:  10/25/24            Patient will demonstrate improvements in strength        Start:  10/04/24    Expected End:  10/25/24               Pain - Adult              Education Documentation  Precautions, taught by Ashleigh Shook PT at 10/4/2024 10:39 AM.  Learner: Patient  Readiness: Acceptance  Method: Explanation  Response: Needs Reinforcement    Body Mechanics, taught by Ashleigh Shook PT at 10/4/2024 10:39 AM.  Learner: Patient  Readiness: Acceptance  Method: Explanation  Response: Needs Reinforcement    Mobility Training, taught by Ashleigh Shook PT at 10/4/2024 10:39 AM.  Learner: Patient  Readiness: Acceptance  Method: Explanation  Response: Needs Reinforcement    Education Comments  No comments found.

## 2024-10-04 NOTE — CONSULTS
Inpatient consult to Neurology  Consult performed by: Pattie Emerson MD  Consult ordered by: HECTOR Alvarado      Inpatient consult to Neurology  Consult performed by: Pattie Emerson MD  Consult ordered by: HECTOR Alvarado      Inpatient consult to Neurology  Consult performed by: Pattie Emerson MD  Consult ordered by: HECTOR Alvarado          History Of Present Illness  Keisha Baxter is a 84 y.o. female presenting with symptoms concerning for stroke.  The patient reports she has no history of migraine and yesterday morning she was in her usual state of health when she suddenly had a strange feeling while standing.  The next thing that happened was that she felt weak all over and her legs felt rubbery.  Then she lost the left visual field as she noticed that her  had only 1 eye and she could only see half of the clock during which time she felt numbness on her left arm and leg.  All of these happened all at once without a marching progression.  She was brought in for further evaluation and reports that her blood pressure is terrifically high at that time.  She is back to her normal baseline without sequelae.      Past Medical History  She has a past medical history of Arthritis, Disease of thyroid gland, and Hyperlipidemia.    Surgical History  She has a past surgical history that includes Total hip arthroplasty (Bilateral, 2019); Hernia repair; and Colostomy.     Social History  She reports that she quit smoking about 20 years ago. Her smoking use included cigarettes. She started smoking about 68 years ago. She has a 48 pack-year smoking history. She has never used smokeless tobacco. She reports current alcohol use of about 3.0 standard drinks of alcohol per week. She reports that she does not use drugs.     Allergies  Penicillins and Shellfish derived    Medications  Medications Prior to Admission   Medication Sig Dispense Refill Last Dose    cholecalciferol (Vitamin D-3)  "5,000 Units tablet Take 1 tablet (5,000 Units) by mouth once daily.   10/2/2024    cyanocobalamin, vitamin B-12, 5,000 mcg tablet, sublingual Place under the tongue.   10/2/2024    desmopressin (DDAVP) 0.2 mg tablet Take 1 tablet (0.2 mg) by mouth once daily.   10/2/2024    levothyroxine (Synthroid, Levoxyl) 100 mcg tablet Take 1 tablet (100 mcg) by mouth once daily in the morning. Take before meals. 90 tablet 1 10/3/2024    meloxicam (Mobic) 15 mg tablet Take 1 tablet (15 mg) by mouth early in the morning..   10/2/2024    simvastatin (Zocor) 40 mg tablet Take 1 tablet (40 mg) by mouth once daily at bedtime. 90 tablet 1 10/2/2024    chlorhexidine (Peridex) 0.12 % solution Use as directed (Patient not taking: Reported on 10/3/2024) 473 mL 0 Not Taking     Review of Systems    Scheduled medications  aspirin, 81 mg, oral, Daily  [Held by provider] cholecalciferol, 5,000 Units, oral, Daily  [Held by provider] cyanocobalamin, 5,000 mcg, oral, Daily  [Held by provider] desmopressin, 0.2 mg, oral, Daily  famotidine, 20 mg, oral, BID   Or  famotidine, 20 mg, intravenous, BID  heparin (porcine), 5,000 Units, subcutaneous, BID  levothyroxine, 100 mcg, oral, Daily  lisinopril, 10 mg, oral, Daily  simvastatin, 40 mg, oral, Nightly      Continuous medications  niCARdipine, 2.5-15 mg/hr, Last Rate: Stopped (10/03/24 1300)      PRN medications  PRN medications: acetaminophen **OR** acetaminophen **OR** acetaminophen, benzocaine-menthol, bisacodyl, bisacodyl, dextromethorphan-guaifenesin, hydrALAZINE, ondansetron **OR** ondansetron, polyethylene glycol, prochlorperazine **OR** prochlorperazine **OR** prochlorperazine    Last Recorded Vitals   Blood pressure (!) 168/95, pulse 79, temperature 36 °C (96.8 °F), temperature source Temporal, resp. rate 20, height 1.676 m (5' 5.98\"), weight 76.2 kg (168 lb), SpO2 96%.    Heart is RRR, Lungs CTAB  Neurologically, pt is awake and oriented x4. Attention, concentration, memory, cortical " processing are intact. No aphasia  Cranial nerves: VFF, EOMI, No nystagmus, Face is symmetric to sensory and motor, no dysarthria, Tongue protrudes midline, Shrug symmetric  Motor: 5/5 strength B throughout, no tremor or asterixis  Sensation is intact bilaterally throughout  Coordination: no ataxia, no dysmetria/dysdiadochokinesia  DTR symmetric  Gait unable to assess    Relevant Results    Results for orders placed or performed during the hospital encounter of 10/03/24 (from the past 96 hour(s))   POCT GLUCOSE   Result Value Ref Range    POCT Glucose 140 (H) 74 - 99 mg/dL   CBC and Auto Differential   Result Value Ref Range    WBC 5.3 4.4 - 11.3 x10*3/uL    nRBC 0.0 0.0 - 0.0 /100 WBCs    RBC 5.10 4.00 - 5.20 x10*6/uL    Hemoglobin 15.6 12.0 - 16.0 g/dL    Hematocrit 46.7 (H) 36.0 - 46.0 %    MCV 92 80 - 100 fL    MCH 30.6 26.0 - 34.0 pg    MCHC 33.4 32.0 - 36.0 g/dL    RDW 12.4 11.5 - 14.5 %    Platelets 147 (L) 150 - 450 x10*3/uL    Neutrophils % 41.2 40.0 - 80.0 %    Immature Granulocytes %, Automated 0.4 0.0 - 0.9 %    Lymphocytes % 43.9 13.0 - 44.0 %    Monocytes % 13.0 2.0 - 10.0 %    Eosinophils % 1.1 0.0 - 6.0 %    Basophils % 0.4 0.0 - 2.0 %    Neutrophils Absolute 2.19 1.60 - 5.50 x10*3/uL    Immature Granulocytes Absolute, Automated 0.02 0.00 - 0.50 x10*3/uL    Lymphocytes Absolute 2.33 0.80 - 3.00 x10*3/uL    Monocytes Absolute 0.69 0.05 - 0.80 x10*3/uL    Eosinophils Absolute 0.06 0.00 - 0.40 x10*3/uL    Basophils Absolute 0.02 0.00 - 0.10 x10*3/uL   Comprehensive metabolic panel   Result Value Ref Range    Glucose 123 (H) 74 - 99 mg/dL    Sodium 132 (L) 136 - 145 mmol/L    Potassium 3.7 3.5 - 5.3 mmol/L    Chloride 98 98 - 107 mmol/L    Bicarbonate 25 21 - 32 mmol/L    Anion Gap 13 10 - 20 mmol/L    Urea Nitrogen 16 6 - 23 mg/dL    Creatinine 0.63 0.50 - 1.05 mg/dL    eGFR 88 >60 mL/min/1.73m*2    Calcium 9.2 8.6 - 10.3 mg/dL    Albumin 4.5 3.4 - 5.0 g/dL    Alkaline Phosphatase 66 33 - 136 U/L     Total Protein 7.5 6.4 - 8.2 g/dL    AST 18 9 - 39 U/L    Bilirubin, Total 0.8 0.0 - 1.2 mg/dL    ALT 15 7 - 45 U/L   Troponin I, High Sensitivity   Result Value Ref Range    Troponin I, High Sensitivity 9 0 - 13 ng/L   Protime-INR   Result Value Ref Range    Protime 10.8 9.3 - 12.7 seconds    INR 1.0 0.9 - 1.2   APTT   Result Value Ref Range    aPTT 28.2 22.0 - 32.5 seconds   ECG 12 lead   Result Value Ref Range    Ventricular Rate 78 BPM    Atrial Rate 78 BPM    VA Interval 182 ms    QRS Duration 120 ms    QT Interval 414 ms    QTC Calculation(Bazett) 471 ms    P Axis 67 degrees    R Axis -24 degrees    T Axis 84 degrees    QRS Count 13 beats    Q Onset 225 ms    P Onset 134 ms    P Offset 194 ms    T Offset 432 ms    QTC Fredericia 451 ms   Hepatic Function Panel   Result Value Ref Range    Albumin 4.3 3.4 - 5.0 g/dL    Bilirubin, Total 0.7 0.0 - 1.2 mg/dL    Bilirubin, Direct 0.1 0.0 - 0.3 mg/dL    Alkaline Phosphatase 62 33 - 136 U/L    ALT 16 7 - 45 U/L    AST 19 9 - 39 U/L    Total Protein 7.2 6.4 - 8.2 g/dL   B-Type Natriuretic Peptide   Result Value Ref Range    BNP 65 0 - 99 pg/mL   Lipid Panel   Result Value Ref Range    Cholesterol 159 0 - 199 mg/dL    HDL-Cholesterol 50.2 mg/dL    Cholesterol/HDL Ratio 3.2     LDL Calculated 82 <=99 mg/dL    VLDL 26 0 - 40 mg/dL    Triglycerides 132 0 - 149 mg/dL    Non HDL Cholesterol 109 0 - 149 mg/dL   CBC   Result Value Ref Range    WBC 7.2 4.4 - 11.3 x10*3/uL    nRBC 0.0 0.0 - 0.0 /100 WBCs    RBC 4.93 4.00 - 5.20 x10*6/uL    Hemoglobin 14.9 12.0 - 16.0 g/dL    Hematocrit 44.5 36.0 - 46.0 %    MCV 90 80 - 100 fL    MCH 30.2 26.0 - 34.0 pg    MCHC 33.5 32.0 - 36.0 g/dL    RDW 12.7 11.5 - 14.5 %    Platelets 151 150 - 450 x10*3/uL   Basic metabolic panel   Result Value Ref Range    Glucose 118 (H) 74 - 99 mg/dL    Sodium 136 136 - 145 mmol/L    Potassium 4.0 3.5 - 5.3 mmol/L    Chloride 104 98 - 107 mmol/L    Bicarbonate 24 21 - 32 mmol/L    Anion Gap 12 10 - 20  mmol/L    Urea Nitrogen 16 6 - 23 mg/dL    Creatinine 0.61 0.50 - 1.05 mg/dL    eGFR 88 >60 mL/min/1.73m*2    Calcium 9.2 8.6 - 10.3 mg/dL   Hemoglobin A1C   Result Value Ref Range    Hemoglobin A1C 5.7 (H) See comment %    Estimated Average Glucose 117 Not Established mg/dL   Transthoracic Echo (TTE) Complete   Result Value Ref Range    AV pk claudia 1.77 m/s    LVOT diam 2.06 cm    AV mn grad 6.5 mmHg    MV E/A ratio 0.56     Tricuspid annular plane systolic excursion 1.9 cm    LV Biplane EF 70 %    LA vol index A/L 29.3 ml/m2    MV avg E/e' ratio 13.98     LV EF 58 %    RV free wall pk S' 14.58 cm/s    RVSP 27.5 mmHg    LVIDd 2.53 cm    AV pk grad 12.6 mmHg    Aortic Valve Area by Continuity of VTI 1.98 cm2    Aortic Valve Area by Continuity of Peak Velocity 1.57 cm2    LV A4C EF 69.5         Transthoracic Echo (TTE) Complete    Result Date: 10/4/2024           [place] [address]            Phone [phone] TRANSTHORACIC ECHOCARDIOGRAM REPORT Patient Name:      BREEZY Vincent Physician:   [id number] You Maravilla DO Study Date:        10/4/2024            Ordering Provider:   [id number] MONCADA MRN/PID:           [id number]             Fellow: Accession#:        [id number]         Nurse: Date of Birth/Age: 1940 / 84 years Sonographer:         Estephanie Ellis RDCS Gender:            F                    Additional Staff: Height:            167.64 cm            Admit Date: Weight:            76.20 kg             Admission Status:    Inpatient - Routine BSA / BMI:         1.86 m2 / 27.12      Department Location: [facility]                    kg/m2 Blood Pressure: 146 /63 mmHg Study Type:    TRANSTHORACIC ECHO (TTE) COMPLETE Diagnosis/ICD: Transient cerebral ischemic attack, unspecified (NOT on                LCD)-G45.9 Indication:    Transischemic Attack CPT Codes:     Echo Complete w Full Doppler-[id number]  Study Detail: The following Echo studies were  performed: 2D, M-Mode, Doppler and               color flow.  PHYSICIAN INTERPRETATION: Left Ventricle: The left ventricular systolic function is normal, with a visually estimated ejection fraction of 55-60%. There are no regional wall motion abnormalities. The left ventricular cavity size is normal. Spectral Doppler shows a normal pattern of left ventricular diastolic filling. Left Atrium: The left atrium is normal in size. Right Ventricle: The right ventricle is normal in size. There is normal right ventricular global systolic function. Right Atrium: The right atrium is normal in size. Aortic Valve: The aortic valve is trileaflet. The aortic valve dimensionless index is 0.60. There is no evidence of aortic valve regurgitation. The peak instantaneous gradient of the aortic valve is 12.6 mmHg. The mean gradient of the aortic valve is 6.5 mmHg. Mitral Valve: The mitral valve is normal in structure. There is no evidence of mitral valve regurgitation. Tricuspid Valve: The tricuspid valve is structurally normal. No evidence of tricuspid regurgitation. Pulmonic Valve: The pulmonic valve is structurally normal. There is trace pulmonic valve regurgitation. Pericardium: No pericardial effusion noted. Aorta: The aortic root is normal.  CONCLUSIONS:  1. The left ventricular systolic function is normal, with a visually estimated ejection fraction of 55-60%.  2. There is normal right ventricular global systolic function.  3. No cardiac source of embolus identified. QUANTITATIVE DATA SUMMARY:  2D MEASUREMENTS:           Normal Ranges: IVSd:            1.94 cm   (0.6-1.1cm) LVPWd:           1.27 cm   (0.6-1.1cm) LVIDd:           2.53 cm   (3.9-5.9cm) LVIDs:           1.72 cm LV Mass Index:   77.7 g/m2 LV % FS          32.1 %  LA VOLUME:                    Normal Ranges: LA Vol A4C:        45.4 ml    (22+/-6mL/m2) LA Vol A2C:        56.6 ml LA Vol BP:         54.4 ml LA Vol Index A4C:  24.4 ml/m2 LA Vol Index A2C:  30.4 ml/m2 LA  Vol Index BP:   29.3 ml/m2 LA Area A4C:       17.9 cm2 LA Area A2C:       18.6 cm2 LA Major Axis A4C: 6.0 cm LA Major Axis A2C: 5.2 cm LA Volume Index:   29.2 ml/m2 LA Vol A4C:        41.0 ml LA Vol A2C:        54.6 ml LA Vol Index BSA:  25.7 ml/m2  RA VOLUME BY A/L METHOD:          Normal Ranges: RA Area A4C:             14.8 cm2  AORTA MEASUREMENTS:         Normal Ranges: Ao Sinus, d:        3.10 cm (2.1-3.5cm) Ao STJ, d:          2.00 cm (1.7-3.4cm) Asc Ao, d:          3.10 cm (2.1-3.4cm)  LV SYSTOLIC FUNCTION BY 2D PLANIMETRY (MOD):                      Normal Ranges: EF-A4C View:    70 % (>=55%) EF-A2C View:    72 % EF-Biplane:     70 % EF-Visual:      58 % LV EF Reported: 58 %  LV DIASTOLIC FUNCTION:           Normal Ranges: MV Peak E:             0.55 m/s  (0.7-1.2 m/s) MV Peak A:             1.00 m/s  (0.42-0.7 m/s) E/A Ratio:             0.56      (1.0-2.2) MV e'                  0.040 m/s (>8.0) MV lateral e'          0.04 m/s MV medial e'           0.04 m/s E/e' Ratio:            13.98     (<8.0)  MITRAL VALVE:          Normal Ranges: MV DT:        319 msec (150-240msec)  AORTIC VALVE:                      Normal Ranges: AoV Vmax:                1.77 m/s  (<=1.7m/s) AoV Peak P.6 mmHg (<20mmHg) AoV Mean P.5 mmHg  (1.7-11.5mmHg) LVOT Max Sheldon:            0.83 m/s  (<=1.1m/s) AoV VTI:                 31.69 cm  (18-25cm) LVOT VTI:                18.89 cm LVOT Diameter:           2.06 cm   (1.8-2.4cm) AoV Area, VTI:           1.98 cm2  (2.5-5.5cm2) AoV Area,Vmax:           1.57 cm2  (2.5-4.5cm2) AoV Dimensionless Index: 0.60  RIGHT VENTRICLE: RV Basal 3.88 cm RV Major 6.6 cm TAPSE:   19.3 mm RV s'    0.15 m/s  TRICUSPID VALVE/RVSP:          Normal Ranges: Peak TR Velocity:     2.47 m/s RV Syst Pressure:     27 mmHg  (< 30mmHg) IVC Diam:             1.86 cm  AORTA: Asc Ao Diam 3.08 cm  [id number] [facility] Electronically signed on 10/4/2024 at 3:13:46 PM  ** Final **     CT angio  neck    Result Date: 10/4/2024  Interpreted By:  Onofre Marie, STUDY: CT ANGIO NECK;  10/4/2024 1:27 pm   INDICATION: Signs/Symptoms:tia.   COMPARISON: None.   ACCESSION NUMBER(S): [id number]   ORDERING CLINICIAN: BSESY MONCADA   TECHNIQUE: Following intravenous injection of contrast material, CT was acquired from the aortic arch to the vertex of the skull. Multiplanar reconstructions and 3D reconstructions were made.3D reconstructions, MIPs, Volume Rendered, or Surface Shading image were performed at a separate workstation   FINDINGS: Chest   The aortic arch and origin of great vessels are normal. The visualized mediastinum and pulmonary apices are normal.   Neck   Right carotid The common carotid artery is normal. There are atherosclerotic calcified and noncalcified plaques at the bifurcation without measurable luminal narrowing. The cervical, petrous and cavernous portions are normal. There are minor atherosclerotic calcifications in the supraclinoid portions.   Left carotid The common carotid artery is normal. The bifurcation harbors calcified and noncalcified atherosclerotic plaques without measurable luminal narrowing. The cervical, petrous and cavernous portions are normal. There are minor atherosclerotic calcifications in the supraclinoid portions.   Vertebrals   The right vertebral artery is dominant. Vertebral artery origin is are normal. No evidence of compression or filling defect in the cervical portions. The transverse intradural portions are normal. The vertebrobasilar junction is normal. The basilar artery is normal.   Soft tissue neck     There is no evidence of mass, cyst or adenopathy within the neck   Intracranial   There is no evidence of aneurysm, vascular malformation or branch occlusion.       * Atherosclerotic changes at the carotid bifurcations and supraclinoid as well as cavernous carotid arteries bilaterally. *Minimal decreased lumen at the carotid bifurcations without measurable  luminal narrowing.   MACRO: none   Signed by: Onofre Marie 10/4/2024 1:43 PM Dictation workstation:   [id number]    ECG 12 lead    Result Date: 10/3/2024  Normal sinus rhythm Left ventricular hypertrophy with QRS widening and repolarization abnormality Abnormal ECG When compared with ECG of 01-OCT-2024 08:37, No significant change was found Confirmed by Michael Barrios ([id number]) on 10/3/2024 10:52:22 PM    MR brain wo IV contrast    Result Date: 10/3/2024  Interpreted By:  Ruben Ariza, STUDY: MR BRAIN WO IV CONTRAST; MR ANGIO HEAD WO IV CONTRAST;  10/3/2024 7:35 pm   INDICATION: Signs/Symptoms:TIA.   COMPARISON: Head CT 10/03/2024. Brain MRI 01/22/2016.   ACCESSION NUMBER(S): [id number]; [id number]   ORDERING CLINICIAN: BESSY MONCADA   TECHNIQUE: Axial T2, FLAIR, DWI, gradient echo T2 and sagittal and coronal T1 weighted images of brain were acquired. Noncontrast time-of-flight MR angiogram of the xvulip-[id number] vessels was obtained with MIP reformats.   FINDINGS: Brain MRI: There is no evidence of acute infarction. There are nocerebral atrophy and prominence of extra  extra-axial collections. There is no mass lesion and no midline shift. There is no hydrocephalus. There is mild-to-moderate generalized cerebral volume loss with more focal frontal cerebral atrophy and prominence of the frontal extra-axial spaces similar to 2016. Scattered periventricular and deep white matter and brainstem FLAIR hyperintensities suggestive of mild-to-moderate chronic microvascular ischemic change.   Paranasal Sinuses and Mastoids: Visualized paranasal sinuses are well aerated. Small amount of fluid in the left caudal mastoid air cells. Right mastoid air cells are clear. The orbits are grossly normal.   MRA of the brain: Anterior circulation: The intracranial portions of the internal carotid, middle cerebral, and anterior cerebral arteries are patent, without significant focal stenosis. There is a normal anterior  communicating artery.   Posterior circulation: The intracranial portions of the vertebral arteries are patent. The basilar artery is patent without focal stenosis. The visualized portions of proximal posterior cerebral arteries are patent without focal stenosis.         No evidence of acute infarct, intracranial mass effect or midline shift. Mild-to-moderate cerebral volume loss and chronic microvascular ischemic change. No evidence of large vessel occlusion or critical stenosis intracranially.   Signed by: Ruben Ariza 10/3/2024 8:05 PM Dictation workstation:   [id number]    MR angio head wo IV contrast    Result Date: 10/3/2024  Interpreted By:  Ruben Ariza, STUDY: MR BRAIN WO IV CONTRAST; MR ANGIO HEAD WO IV CONTRAST;  10/3/2024 7:35 pm   INDICATION: Signs/Symptoms:TIA.   COMPARISON: Head CT 10/03/2024. Brain MRI 01/22/2016.   ACCESSION NUMBER(S): [id number]; [id number]   ORDERING CLINICIAN: BESSY MONCADA   TECHNIQUE: Axial T2, FLAIR, DWI, gradient echo T2 and sagittal and coronal T1 weighted images of brain were acquired. Noncontrast time-of-flight MR angiogram of the [id number] vessels was obtained with MIP reformats.   FINDINGS: Brain MRI: There is no evidence of acute infarction. There are nocerebral atrophy and prominence of extra  extra-axial collections. There is no mass lesion and no midline shift. There is no hydrocephalus. There is mild-to-moderate generalized cerebral volume loss with more focal frontal cerebral atrophy and prominence of the frontal extra-axial spaces similar to 2016. Scattered periventricular and deep white matter and brainstem FLAIR hyperintensities suggestive of mild-to-moderate chronic microvascular ischemic change.   Paranasal Sinuses and Mastoids: Visualized paranasal sinuses are well aerated. Small amount of fluid in the left caudal mastoid air cells. Right mastoid air cells are clear. The orbits are grossly normal.   MRA of the brain: Anterior circulation: The  intracranial portions of the internal carotid, middle cerebral, and anterior cerebral arteries are patent, without significant focal stenosis. There is a normal anterior communicating artery.   Posterior circulation: The intracranial portions of the vertebral arteries are patent. The basilar artery is patent without focal stenosis. The visualized portions of proximal posterior cerebral arteries are patent without focal stenosis.         No evidence of acute infarct, intracranial mass effect or midline shift. Mild-to-moderate cerebral volume loss and chronic microvascular ischemic change. No evidence of large vessel occlusion or critical stenosis intracranially.   Signed by: Ruben Ariza 10/3/2024 8:05 PM Dictation workstation:   [id number]    CT brain attack head wo IV contrast    Result Date: 10/3/2024  Interpreted By:  Dash Packer, STUDY: CT BRAIN ATTACK HEAD WO IV CONTRAST;  10/3/2024 10:18 am   INDICATION: Signs/Symptoms:L leg weakness, L sided paraesthesias.   COMPARISON: None.   ACCESSION NUMBER(S): [id number]   ORDERING CLINICIAN: MARIA DEL CARMEN ALFARO   TECHNIQUE: Routine axial images were obtained from the skull base through the vertex.  Sagittal and coronal reconstruction images were generated. Brain, subdural, and bone windows were reviewed. N/A Unavailable   FINDINGS: INTRACRANIAL: Mild-to-moderate prominence of ventricles and sulci. There is mild patchy hypodensity throughout the deep periventricular white matter. No acute intracranial bleed, midline shift, or focal mass effect. No destructive bone lesion. No depressed skull fracture. Skullbase arterial calcifications in the carotid siphons and vertebral arteries.   EXTRACRANIAL: Visualized paranasal sinuses were clear. Visualized mastoid air cells were clear.       No acute intracranial bleed or focal mass effect.   Mild-to-moderate volume loss.   Mild chronic white matter ischemic disease in the deep periventricular regions.   MACRO: Dash aPcker  discussed the significance and urgency of this critical finding by epic secure chat with  MARIA DEL CARMEN ALFARO on 10/3/2024 at 10:33 am.  (**-RCF-**) Findings:  See findings.   Signed by: Dash Packer 10/3/2024 10:33 AM Dictation workstation:   [id number]    ECG 12 lead (Clinic Performed)    Result Date: 10/1/2024  Normal sinus rhythm Left axis deviation Incomplete right bundle branch block Voltage criteria for left ventricular hypertrophy Cannot rule out Septal infarct , age undetermined Abnormal ECG No previous ECGs available Confirmed by Michael Barrios ([id number]) on 10/1/2024 11:43:22 PM        Assessment/Plan   Active Problems:  There are no active Hospital Problems.    84-year-old woman presented with a TIA in the setting of malignant hypertension.  I suspect that this was not a thrombus or embolus causing the event but instead likely endorgan effects of her malignant hypertension.  Her blood pressure was managed appropriately and since normalization of her blood pressure her symptoms have resolved.  She did have a little bit of a headache during that time but that is also gone and she has no history of migraine.  Her imaging is reassuring and that she has not had a stroke.  She does have small vessel ischemic changes likely a consequence of her blood pressure over time but this can be managed further as an outpatient.  I do think she is okay for discharge to home but recommend use of Plavix 75 mg daily for stroke prevention in the future.  I will sign off and she may follow-up with neurology in 4 to 6 weeks.    I personally spent 60 minutes today, exclusive of procedures, providing care for this patient, including preparation, face to face time, documentation and other services such as review of medical records, diagnostic result, patient education, counseling, coordination of care as specified in the encounter.

## 2024-10-04 NOTE — PROGRESS NOTES
10/04/24 1100   Discharge Planning   Expected Discharge Disposition Home     TCC spoke to patient at bedside Patient refusing HHC at this time, will turn home with no needs.   Patient states she is having knee surgery in 2 weeks and will have home care set up after that     **Patient has a  safe discharge plan**

## 2024-10-04 NOTE — NURSING NOTE
UPDATE 0000: patient up to use restroom during admission process, patient began to feel lightheaded, felt she had to have a BM, however patient did not have a BM. Patient assisted back to bed, patient Vitals obtained, patient felt nauseated, dizzy and cold with tingling fingers. Patient facial symmetry equal and hand push pulls equal but week.  IV Zofran administered, patient reported nausea reduced. Labs obtained and sent, patient repositioned in bed, pur wick in place. Patient repositioned and boosted in bed, resting. Will continue to monitor.

## 2024-10-04 NOTE — ASSESSMENT & PLAN NOTE
Patient has tingling numbness on left side, addition light headache  MRI MRA were negative    CTA neck Atherosclerotic changes at the carotid bifurcations and  supraclinoid as well as cavernous carotid arteries bilaterally.  *Minimal decreased lumen at the carotid bifurcations without  measurable luminal narrowing.   D echo  Following with  neuro  Neurocheck every shift and if needed  Fall precautions and aspiration precaution

## 2024-10-04 NOTE — PROGRESS NOTES
Occupational Therapy    Evaluation/Treatment    Patient Name: Keisha Baxter  MRN: [id number]  Department: [facility] E  Room: 09/09-B  Today's Date: 10/04/24  Time Calculation  Start Time: 0845  Stop Time: 0908  Time Calculation (min): 23 min       Assessment:  OT Assessment: 84 year old female who comes to the ED with complains of dizziness, when EMS arrived, patient with high BP . In the ED patient with complains of LUE /L LE numbness and tingling with facial droop concerns. Patient negative for CVA from CT and MRI. Patient with no tingling/numbness at the evaluation, mild decreased endurance, decreased balance , will benefit from contnued OT intervention to increased her level of functioning overall to independent. Patient reports scheduled for R Knee replacemetn 10/15.  Prognosis: Good  Barriers to Discharge: None  Evaluation/Treatment Tolerance: Patient limited by fatigue  Medical Staff Made Aware: Yes  End of Session Communication: Bedside nurse  End of Session Patient Position: Alarm on, Up in chair  OT Assessment Results: Decreased functional mobility, Decreased endurance  Prognosis: Good  Barriers to Discharge: None  Evaluation/Treatment Tolerance: Patient limited by fatigue  Medical Staff Made Aware: Yes  Strengths: Ability to acquire knowledge  Barriers to Participation: Comorbidities  Plan:  Treatment Interventions: ADL retraining, Endurance training, UE strengthening/ROM, Patient/family training, Equipment evaluation/education, Functional transfer training, Compensatory technique education  OT Frequency: 3 times per week  OT Discharge Recommendations: Low intensity level of continued care  Equipment Recommended upon Discharge: Straight cane  OT Recommended Transfer Status: Assist of 1  OT - OK to Discharge: Yes  Treatment Interventions: ADL retraining, Endurance training, UE strengthening/ROM, Patient/family training, Equipment evaluation/education, Functional transfer training, Compensatory technique  education    Subjective   Current Problem:  1. Hypertensive emergency  Transthoracic Echo (TTE) Complete    Transthoracic Echo (TTE) Complete      2. Dizziness        3. Cerebrovascular accident (CVA), unspecified mechanism (Multi)        4. TIA (transient ischemic attack)  Transthoracic Echo (TTE) Complete    Vascular US Carotid Artery Duplex Bilateral    Vascular US Carotid Artery Duplex Bilateral    Transthoracic Echo (TTE) Complete      5. Acute anterior circulation TIA  Vascular US Carotid Artery Duplex Bilateral    Vascular US Carotid Artery Duplex Bilateral        General:   OT Received On: 10/04/24  General  Reason for Referral: Decreased ADL function for dizziness, R/O Stroke.  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  Precautions:  Hearing/Visual Limitations: glasses for reading, mild [place]  Medical Precautions: Fall precautions  Precautions Comment: Patient was instructed in calling staff to use the restroom and to walk in the hallways while in the hospital .              Pain:  Pain Assessment  Pain Assessment: 0-10  0-10 (Numeric) Pain Score: 0 - No pain    Objective   Cognition:  Overall Cognitive Status: Within Functional Limits  Orientation Level: Oriented X4  Attention: Within Functional Limits  Problem Solving: Exceptions to WFL  Insight: Mild    Home Living:  Type of Home:  House  Lives With: Spouse  Home Adaptive Equipment: Cane, Reacher, Sock aid, Crutches, Walker rolling or standard  Home Layout: One level  Home Access: Stairs to enter without rails  Entrance Stairs-Rails: None  Entrance Stairs-Number of Steps: 1  Bathroom Shower/Tub: Tub/shower unit, Walk-in shower  Bathroom Toilet: Standard  Bathroom Equipment: Grab bars in shower  Home Living Comments: Patient reports spouse in good health and can assist as needed.  Prior Function:  Level of [place]: Independent with ADLs and functional transfers, Independent with homemaking with ambulation  Receives Help From: Family  ADL Assistance: Independent  Homemaking Assistance: Independent  Ambulatory Assistance: Independent (without AD, furniture walks due to R knee OA, scheduled for R TKA 10/15/24.)  Vocational: Retired  Leisure: Roller blades, [email or web address]  Hand Dominance: Right  Prior Function Comments: Patient denies any falls , drives.  IADL History:  Homemaking Responsibilities: Yes  Meal Prep Responsibility: Primary  Laundry Responsibility: Primary  Cleaning Responsibility: Primary  Bill Paying/Finance Responsibility: Primary  Current License: Yes  Mode of Transportation: Car  ADL:  Eating Assistance: Independent  Eating Deficit: Setup  Grooming Assistance: Independent  Grooming Deficit: Setup  Bathing Assistance: Not performed  UE Dressing Assistance: Independent  UE Dressing Deficit: Setup  LE Dressing Assistance: Stand by  LE Dressing Deficit: Don/doff R sock, Don/doff L sock  Toileting Assistance with Device: Stand by  Toileting Deficit: Supervison/safety  Functional Assistance: Stand by  Functional Deficit: Supervision/safety    Activity Tolerance:  Endurance: Decreased tolerance for upright activites  Activity Tolerance Comments: Patient reports felling weak overall, decreased endurance due to OA in R knee, scheduled for R TKA.10/15.  Functional Standing Tolerance:  Time: 3-4min  Activity: ADL  Functional Standing Tolerance  Comments: CClose Supervision  Bed Mobility/Transfers: Bed Mobility  Bed Mobility: No    Transfers  Transfer: Yes (Patient was CLose Supervision this date to transfer from the chair and toilet this date without AD, no dizziness reported.)    Functional Mobility:  Functional Mobility  Functional Mobility Performed: Yes (Patient was Close supervision this date to walk in the room and to the bathroom this date , tends to furniture walk , per patient scheduled for R TKA 10/15 .)  Sitting Balance:  Static Sitting Balance  Static Sitting-Balance Support: Feet supported  Static Sitting-Level of Assistance: Independent  Static Sitting-Comment/Number of Minutes: 5min  Dynamic Sitting Balance  Dynamic Sitting-Balance Support: Feet supported, No upper extremity supported  Dynamic Sitting-Level of Assistance: Close supervision, Distant supervision  Dynamic Sitting-Balance: Lateral lean, Reaching for objects  Dynamic Sitting-Comments: 5min, good  Standing Balance:  Static Standing Balance  Static Standing-Balance Support: No upper extremity supported  Static Standing-Level of Assistance: Close supervision  Static Standing-Comment/Number of Minutes: 3-4min, Good  Dynamic Standing Balance  Dynamic Standing-Balance Support: No upper extremity supported  Dynamic Standing-Level of Assistance: Close supervision  Dynamic Standing-Balance: Reaching for objects  Dynamic Standing-Comments: Good ,3-4min    Vision:Vision - Basic Assessment  Current Vision: Wears glasses only for reading  Sensation:  Sensation Comment: Patient reports no tingling/numbness in BUE/BLE  Strength:  Strength Comments: 4/5 overall BUE    Coordination:  Movements are Fluid and Coordinated: Yes   Hand Function:  Hand Function  Gross Grasp: Functional  Coordination: Functional    Outcome Measures: [facility] Daily Activity  Putting on and taking off regular lower body clothing: A little  Bathing (including washing, rinsing, drying): A little  Putting on and taking off  regular upper body clothing: A little  Toileting, which includes using toilet, bedpan or urinal: A little  Taking care of personal grooming such as brushing teeth: A little  Eating Meals: None  Daily Activity - Total Score: 19        Education Documentation  ADL Training, taught by Luna Hayes OT at 10/4/2024 11:07 AM.  Learner: Patient  Readiness: Acceptance  Method: Explanation  Response: Verbalizes Understanding, Demonstrated Understanding, Needs Reinforcement  Comment: Patient was instructed in safe functional transfers/mobility with ADL tasks.    Education Comments  No comments found.        OP EDUCATION:       Goals:  Encounter Problems       Encounter Problems (Active)       OT Goals       Patient will be modified independent with all ADL tasks of bathing, dressing, grooming and toileting using good safety with G balance. (Progressing)       Start:  10/04/24    Expected End:  10/11/24            Patient will be able to complete all functional transfers/mobility with modified independence using good safety and with G balance. (Progressing)       Start:  10/04/24    Expected End:  10/11/24            Patient will be able to tolerate 15min of functional standing with G balance in prep for ADL/I ADL tasks. (Progressing)       Start:  10/04/24    Expected End:  10/11/24

## 2024-10-04 NOTE — DISCHARGE SUMMARY
"Discharge Diagnosis  Dizziness    Issues Requiring Follow-Up  Numbness and tingling on left-sided    Discharge Meds     Medication List      START taking these medications     aspirin 81 mg EC tablet; Take 1 tablet (81 mg) by mouth once daily.;   Start taking on: October 5, 2024   clopidogrel 75 mg tablet; Commonly known as: Plavix; Take 1 tablet (75   mg) by mouth once daily.   lisinopril 10 mg tablet; Take 1 tablet (10 mg) by mouth once daily.     CONTINUE taking these medications     cholecalciferol 5,000 Units tablet; Commonly known as: Vitamin D-3   cyanocobalamin (vitamin B-12) 5,000 mcg tablet, sublingual   desmopressin 0.2 mg tablet; Commonly known as: DDAVP   levothyroxine 100 mcg tablet; Commonly known as: Synthroid, Levoxyl;   Take 1 tablet (100 mcg) by mouth once daily in the morning. Take before   meals.   simvastatin 40 mg tablet; Commonly known as: Zocor; Take 1 tablet (40   mg) by mouth once daily at bedtime.     STOP taking these medications     chlorhexidine 0.12 % solution; Commonly known as: Peridex   meloxicam 15 mg tablet; Commonly known as: Mobic       Test Results Pending At Discharge  Pending Labs       No current pending labs.            Hospital Course  This is an 84 years old female with past medical history of hyperlipidemia and hypothyroidism who presented to the ER due to complaint of dizziness, not feeling good, and numbness with tingling on  the left side.  She was admitted for evaluation of TIA and hypertension emergency.  Evaluated by Dr. Emerson patient will discharge with Plavix and aspirin.  Patient has to follow-up with her as outpatient.  Patient is aware that her CTA of the neck shows; \" CTA neck Atherosclerotic changes at the carotid bifurcations and  supraclinoid as well as cavernous carotid arteries bilaterally.\"  Patient patient's blood pressure is under control patient will discharge with lisinopril 10 mg daily, advised to monitor her blood pressure at home at least 3 " times a day call her primary doctor if it is high 140 or less than 100.  Patient has an appointment with her primary doctor on Monday she will follow-up with her blood pressure meds which is new for her.  Patient is hemodynamically stable.  No need of physical therapy at home     Pertinent Physical Exam At Time of Discharge  Physical Exam  Vitals and nursing note reviewed.   Constitutional:       Appearance: Normal appearance.   HENT:      Head: Normocephalic and atraumatic.      Nose: Nose normal.      Mouth/Throat:      Mouth: Mucous membranes are moist.   Eyes:      Extraocular Movements: Extraocular movements intact.      Pupils: Pupils are equal, round, and reactive to light.   Cardiovascular:      Rate and Rhythm: Normal rate.   Pulmonary:      Effort: Pulmonary effort is normal.      Breath sounds: Normal breath sounds.   Abdominal:      General: Bowel sounds are normal.      Palpations: Abdomen is soft.   Musculoskeletal:         General: Normal range of motion.      Cervical back: Normal range of motion.   Skin:     General: Skin is warm.   Neurological:      General: No focal deficit present.      Mental Status: She is alert.   Psychiatric:         Mood and Affect: Mood normal.         Outpatient Follow-Up  Future Appointments   Date Time Provider Department [place]   10/7/2024 10:30 AM Ange Frey DO [id number]   11/4/2024 11:15 AM Sharon Wolff PT [id number]   1/13/2025  9:30 AM DO [id number]         JAS Alvarado-CNP

## 2024-10-04 NOTE — PROGRESS NOTES
Breezy Hernandes is a 84 y.o. female on day 1 of admission presenting with Dizziness.      Subjective   Feels better today no numbness or tingling on her left side.  No neurological deficit no light headache no dizziness.  Able to walk to the bathroom without any issues.  Speech clear       Objective     Last Recorded Vitals  /74 (BP Location: Left arm, Patient Position: Sitting)   Pulse 79   Temp 36.1 °C (97 °F) (Temporal)   Resp 16   Wt 76.2 kg (168 lb)   SpO2 95%   Intake/Output last 3 Shifts:    Intake/Output Summary (Last 24 hours) at 10/4/2024 1550  Last data filed at 10/4/2024 0900  Gross per 24 hour   Intake 393.33 ml   Output 0 ml   Net 393.33 ml       Admission Weight  Weight: 73.5 kg (162 lb) (10/03/24 1011)    Daily Weight  10/04/24 : 76.2 kg (168 lb)    Image Results  Transthoracic Echo (TTE) [place] [address]             Phone [phone]    TRANSTHORACIC ECHOCARDIOGRAM REPORT    Patient Name:      BREEZY HERNANDES     [place] Physician:   [id number] [facility]  Study Date:        10/4/2024            Ordering Provider:   [id number] BESSY MONCADA  MRN/PID:           [id number]             Fellow:  Accession#:        [id number]         Nurse:  Date of Birth/Age: 1940 / 84 years Sonographer:         Estephanie Ellis RDCS  Gender:            F                    Additional Staff:  Height:            167.64 cm            Admit Date:  Weight:            76.20 kg             Admission Status:    Inpatient - Routine  BSA / BMI:         1.86 m2 / 27.12      [facility]                     kg/m2  Blood Pressure: 146 /63 mmHg    Study Type:    TRANSTHORACIC ECHO (TTE) COMPLETE  Diagnosis/ICD: Transient cerebral ischemic attack, unspecified (NOT on                 LCD)-G45.9  Indication:    Transischemic Attack  CPT Codes:     Echo Complete w Full Doppler-[id number]   Study Detail: The following Echo studies were  performed: 2D, M-Mode, Doppler and                color flow.       PHYSICIAN INTERPRETATION:  Left Ventricle: The left ventricular systolic function is normal, with a visually estimated ejection fraction of 55-60%. There are no regional wall motion abnormalities. The left ventricular cavity size is normal. Spectral Doppler shows a normal pattern of left ventricular diastolic filling.  Left Atrium: The left atrium is normal in size.  Right Ventricle: The right ventricle is normal in size. There is normal right ventricular global systolic function.  Right Atrium: The right atrium is normal in size.  Aortic Valve: The aortic valve is trileaflet. The aortic valve dimensionless index is 0.60. There is no evidence of aortic valve regurgitation. The peak instantaneous gradient of the aortic valve is 12.6 mmHg. The mean gradient of the aortic valve is 6.5 mmHg.  Mitral Valve: The mitral valve is normal in structure. There is no evidence of mitral valve regurgitation.  Tricuspid Valve: The tricuspid valve is structurally normal. No evidence of tricuspid regurgitation.  Pulmonic Valve: The pulmonic valve is structurally normal. There is trace pulmonic valve regurgitation.  Pericardium: No pericardial effusion noted.  Aorta: The aortic root is normal.       CONCLUSIONS:   1. The left ventricular systolic function is normal, with a visually estimated ejection fraction of 55-60%.   2. There is normal right ventricular global systolic function.   3. No cardiac source of embolus identified.    QUANTITATIVE DATA SUMMARY:     2D MEASUREMENTS:           Normal Ranges:  IVSd:            1.94 cm   (0.6-1.1cm)  LVPWd:           1.27 cm   (0.6-1.1cm)  LVIDd:           2.53 cm   (3.9-5.9cm)  LVIDs:           1.72 cm  LV Mass Index:   77.7 g/m2  LV % FS          32.1 %       LA VOLUME:                    Normal Ranges:  LA Vol A4C:        45.4 ml    (22+/-6mL/m2)  LA Vol A2C:        56.6 ml  LA Vol BP:         54.4 ml  LA Vol Index A4C:   24.4 ml/m2  LA Vol Index A2C:  30.4 ml/m2  LA Vol Index BP:   29.3 ml/m2  LA Area A4C:       17.9 cm2  LA Area A2C:       18.6 cm2  LA Major Axis A4C: 6.0 cm  LA Major Axis A2C: 5.2 cm  LA Volume Index:   29.2 ml/m2  LA Vol A4C:        41.0 ml  LA Vol A2C:        54.6 ml  LA Vol Index BSA:  25.7 ml/m2       RA VOLUME BY A/L METHOD:          Normal Ranges:  RA Area A4C:             14.8 cm2       AORTA MEASUREMENTS:         Normal Ranges:  Ao Sinus, d:        3.10 cm (2.1-3.5cm)  Ao STJ, d:          2.00 cm (1.7-3.4cm)  Asc Ao, d:          3.10 cm (2.1-3.4cm)       LV SYSTOLIC FUNCTION BY 2D PLANIMETRY (MOD):                       Normal Ranges:  EF-A4C View:    70 % (>=55%)  EF-A2C View:    72 %  EF-Biplane:     70 %  EF-Visual:      58 %  LV EF Reported: 58 %       LV DIASTOLIC FUNCTION:           Normal Ranges:  MV Peak E:             0.55 m/s  (0.7-1.2 m/s)  MV Peak A:             1.00 m/s  (0.42-0.7 m/s)  E/A Ratio:             0.56      (1.0-2.2)  MV e'                  0.040 m/s (>8.0)  MV lateral e'          0.04 m/s  MV medial e'           0.04 m/s  E/e' Ratio:            13.98     (<8.0)       MITRAL VALVE:          Normal Ranges:  MV DT:        319 msec (150-240msec)       AORTIC VALVE:                      Normal Ranges:  AoV Vmax:                1.77 m/s  (<=1.7m/s)  AoV Peak P.6 mmHg (<20mmHg)  AoV Mean P.5 mmHg  (1.7-11.5mmHg)  LVOT Max Sheldon:            0.83 m/s  (<=1.1m/s)  AoV VTI:                 31.69 cm  (18-25cm)  LVOT VTI:                18.89 cm  LVOT Diameter:           2.06 cm   (1.8-2.4cm)  AoV Area, VTI:           1.98 cm2  (2.5-5.5cm2)  AoV Area,Vmax:           1.57 cm2  (2.5-4.5cm2)  AoV Dimensionless Index: 0.60       RIGHT VENTRICLE:  RV Basal 3.88 cm  RV Major 6.6 cm  TAPSE:   19.3 mm  RV s'    0.15 m/s       TRICUSPID VALVE/RVSP:          Normal Ranges:  Peak TR Velocity:     2.47 m/s  RV Syst Pressure:     27 mmHg  (< 30mmHg)  IVC Diam:              1.86 cm       AORTA:  Asc Ao Diam 3.08 cm       [id number] You Maravilla DO  Electronically signed on 10/4/2024 at 3:13:46 PM       ** Final **  CT angio neck  Narrative: Interpreted By:  Onofre Marie,   STUDY:  CT ANGIO NECK;  10/4/2024 1:27 pm      INDICATION:  Signs/Symptoms:tia.      COMPARISON:  None.      ACCESSION NUMBER(S):  [id number]      ORDERING CLINICIAN:  BESSY MONCADA      TECHNIQUE:  Following intravenous injection of contrast material, CT was acquired  from the aortic arch to the vertex of the skull. Multiplanar  reconstructions and 3D reconstructions were made.3D reconstructions,  MIPs, Volume Rendered, or Surface Shading image were performed at a  separate workstation      FINDINGS:  Chest      The aortic arch and origin of great vessels are normal. The  visualized mediastinum and pulmonary apices are normal.      Neck      Right carotid  The common carotid artery is normal. There are atherosclerotic  calcified and noncalcified plaques at the bifurcation without  measurable luminal narrowing. The cervical, petrous and cavernous  portions are normal. There are minor atherosclerotic calcifications  in the supraclinoid portions.      Left carotid  The common carotid artery is normal. The bifurcation harbors  calcified and noncalcified atherosclerotic plaques without measurable  luminal narrowing. The cervical, petrous and cavernous portions are  normal. There are minor atherosclerotic calcifications in the  supraclinoid portions.      Vertebrals      The right vertebral artery is dominant. Vertebral artery origin is  are normal. No evidence of compression or filling defect in the  cervical portions. The transverse intradural portions are normal. The  vertebrobasilar junction is normal. The basilar artery is normal.      Soft tissue neck          There is no evidence of mass, cyst or adenopathy within the neck      Intracranial      There is no evidence of aneurysm, vascular malformation or  branch  occlusion.      Impression: * Atherosclerotic changes at the carotid bifurcations and  supraclinoid as well as cavernous carotid arteries bilaterally.  *Minimal decreased lumen at the carotid bifurcations without  measurable luminal narrowing.      MACRO:  none      Signed by: Onofre Marie 10/4/2024 1:43 PM  Dictation workstation:   [id number]      Physical Exam    Relevant Results               Assessment/Plan                  Assessment & Plan  Dizziness  Ortho vitals  Could be from multiple factor elevated blood pressure  Acquired hypothyroidism  Patient on Synthroid continue with home dose 100 micro milligram  65  Mixed hyperlipidemia  Patient on simvastatin 40 mg will continue  Lipid panel in a.m.  Left sided numbness  Left-sided numbness and tingling possible TIA  See below  Chronic pain of right knee  Patient supposed to have a surgery on October 15 on her right knee  Keep pain under control  TIA (transient ischemic attack)  Patient has tingling numbness on left side, addition light headache  MRI MRA were negative    CTA neck Atherosclerotic changes at the carotid bifurcations and  supraclinoid as well as cavernous carotid arteries bilaterally.  *Minimal decreased lumen at the carotid bifurcations without  measurable luminal narrowing.   D echo  Following with  neuro  Neurocheck every shift and if needed  Fall precautions and aspiration precaution  Elevated blood pressure reading without diagnosis of hypertension  Patient's blood pressure was around 180 we will keep blood pressure high due to possible TIA  Order hydralazine if systolic blood pressure above 220  Consider to start patient on oral blood pressure meds if remains high after 24 hours    Discharge plan patient will discharge home when cleared by neuro Dr. Aparna Garcia, APRN-CNP

## 2024-10-04 NOTE — CARE PLAN
The patient's goals for the shift include      The clinical goals for the shift include Patient will ask for assistance prior to using commode or toilet due to wekaness and dizziness    Problem: Fall/Injury  Goal: Not fall by end of shift  Outcome: Progressing  Goal: Be free from injury by end of the shift  Outcome: Progressing  Goal: Verbalize understanding of personal risk factors for fall in the hospital  Outcome: Progressing  Goal: Verbalize understanding of risk factor reduction measures to prevent injury from fall in the home  Outcome: Progressing  Goal: Use assistive devices by end of the shift  Outcome: Progressing  Goal: Pace activities to prevent fatigue by end of the shift  Outcome: Progressing     Problem: Pain - Adult  Goal: Verbalizes/displays adequate comfort level or baseline comfort level  Outcome: Progressing     Problem: Safety - Adult  Goal: Free from fall injury  Outcome: Progressing     Problem: Discharge Planning  Goal: Discharge to home or other facility with appropriate resources  Outcome: Progressing     Problem: Chronic Conditions and Co-morbidities  Goal: Patient's chronic conditions and co-morbidity symptoms are monitored and maintained or improved  Outcome: Progressing

## 2024-10-07 ENCOUNTER — OFFICE VISIT (OUTPATIENT)
Dept: PRIMARY CARE | Facility: CLINIC | Age: 84
End: 2024-10-07
Payer: MEDICARE

## 2024-10-07 ENCOUNTER — TELEPHONE (OUTPATIENT)
Dept: PRIMARY CARE | Facility: CLINIC | Age: 84
End: 2024-10-07

## 2024-10-07 ENCOUNTER — DOCUMENTATION (OUTPATIENT)
Dept: PRIMARY CARE | Facility: CLINIC | Age: 84
End: 2024-10-07
Payer: MEDICARE

## 2024-10-07 ENCOUNTER — PATIENT OUTREACH (OUTPATIENT)
Dept: PRIMARY CARE | Facility: CLINIC | Age: 84
End: 2024-10-07
Payer: MEDICARE

## 2024-10-07 VITALS
OXYGEN SATURATION: 99 % | WEIGHT: 164 LBS | SYSTOLIC BLOOD PRESSURE: 140 MMHG | BODY MASS INDEX: 27.32 KG/M2 | HEART RATE: 72 BPM | DIASTOLIC BLOOD PRESSURE: 80 MMHG | HEIGHT: 65 IN

## 2024-10-07 DIAGNOSIS — I16.1 HYPERTENSIVE EMERGENCY: ICD-10-CM

## 2024-10-07 DIAGNOSIS — G47.00 INSOMNIA, UNSPECIFIED TYPE: Primary | ICD-10-CM

## 2024-10-07 DIAGNOSIS — N32.81 OVERACTIVE BLADDER: Primary | ICD-10-CM

## 2024-10-07 DIAGNOSIS — G45.9 TIA (TRANSIENT ISCHEMIC ATTACK): ICD-10-CM

## 2024-10-07 PROCEDURE — 99495 TRANSJ CARE MGMT MOD F2F 14D: CPT | Performed by: STUDENT IN AN ORGANIZED HEALTH CARE EDUCATION/TRAINING PROGRAM

## 2024-10-07 PROCEDURE — 1123F ACP DISCUSS/DSCN MKR DOCD: CPT | Performed by: STUDENT IN AN ORGANIZED HEALTH CARE EDUCATION/TRAINING PROGRAM

## 2024-10-07 PROCEDURE — 1157F ADVNC CARE PLAN IN RCRD: CPT | Performed by: STUDENT IN AN ORGANIZED HEALTH CARE EDUCATION/TRAINING PROGRAM

## 2024-10-07 PROCEDURE — 1111F DSCHRG MED/CURRENT MED MERGE: CPT | Performed by: STUDENT IN AN ORGANIZED HEALTH CARE EDUCATION/TRAINING PROGRAM

## 2024-10-07 PROCEDURE — 3077F SYST BP >= 140 MM HG: CPT | Performed by: STUDENT IN AN ORGANIZED HEALTH CARE EDUCATION/TRAINING PROGRAM

## 2024-10-07 PROCEDURE — 1036F TOBACCO NON-USER: CPT | Performed by: STUDENT IN AN ORGANIZED HEALTH CARE EDUCATION/TRAINING PROGRAM

## 2024-10-07 PROCEDURE — 1126F AMNT PAIN NOTED NONE PRSNT: CPT | Performed by: STUDENT IN AN ORGANIZED HEALTH CARE EDUCATION/TRAINING PROGRAM

## 2024-10-07 PROCEDURE — 3079F DIAST BP 80-89 MM HG: CPT | Performed by: STUDENT IN AN ORGANIZED HEALTH CARE EDUCATION/TRAINING PROGRAM

## 2024-10-07 PROCEDURE — 1159F MED LIST DOCD IN RCRD: CPT | Performed by: STUDENT IN AN ORGANIZED HEALTH CARE EDUCATION/TRAINING PROGRAM

## 2024-10-07 RX ORDER — CLOPIDOGREL BISULFATE 75 MG/1
75 TABLET ORAL DAILY
Qty: 90 TABLET | Refills: 1 | Status: SHIPPED | OUTPATIENT
Start: 2024-10-07 | End: 2025-04-05

## 2024-10-07 RX ORDER — LISINOPRIL 10 MG/1
10 TABLET ORAL DAILY
Qty: 90 TABLET | Refills: 1 | Status: SHIPPED | OUTPATIENT
Start: 2024-10-07 | End: 2025-04-05

## 2024-10-07 RX ORDER — TRAZODONE HYDROCHLORIDE 50 MG/1
50 TABLET ORAL NIGHTLY PRN
Qty: 30 TABLET | Refills: 1 | Status: SHIPPED | OUTPATIENT
Start: 2024-10-07 | End: 2025-10-07

## 2024-10-07 ASSESSMENT — PATIENT HEALTH QUESTIONNAIRE - PHQ9
1. LITTLE INTEREST OR PLEASURE IN DOING THINGS: NOT AT ALL
2. FEELING DOWN, DEPRESSED OR HOPELESS: NOT AT ALL
SUM OF ALL RESPONSES TO PHQ9 QUESTIONS 1 AND 2: 0

## 2024-10-07 ASSESSMENT — PAIN SCALES - GENERAL: PAINLEVEL: 0-NO PAIN

## 2024-10-07 NOTE — PROGRESS NOTES
Discharge Facility: [facility]  Discharge Diagnosis: Dizziness  Admission Date: 10/03/2024  Discharge Date: 10/04/2024    PCP Appointment Date: 10/07/2024  Specialist Appointment Date: None  Hospital Encounter and Summary Linked: Yes    Patient has PCP appointment today 10/07/2024

## 2024-10-07 NOTE — TELEPHONE ENCOUNTER
Pt called stating she forgot to mention at the appt today if you can send in a medication for sleep. Pt has been having anxiety due to her current health issues and the TIA

## 2024-10-07 NOTE — PROGRESS NOTES
"Subjective   Patient ID: Keisha Baxter is a 84 y.o. female who presents for Hospital Follow-up (Hospital admission 10/03/2017).    HPI  85 yo female here for hospital follow up  Hospital follow up for TIA  Had visual disturbances, concerned for stroke  Head imaging in hospital normal  Started on plavix 75 mg, lisinopril 10 mg  Saw neurology in hospital  2. Overactive bladder  Would like urology referral      Review of Systems  All pertinent positive symptoms are included in the history of present illness.    All other systems have been reviewed and are negative and noncontributory to this patient's current ailments.     Allergies   Allergen Reactions    Penicillins Hives and Unknown     hives    Shellfish Derived Hives        Immunization History   Administered Date(s) Administered    Pfizer COVID-19 vaccine, bivalent, age 12 years and older (30 mcg/0.3 mL) 09/19/2022    Pfizer Purple Cap SARS-CoV-2 01/29/2021, 02/27/2021, 10/05/2021       Objective   Vitals:    10/07/24 1032 10/07/24 1247   BP: 148/80 140/80   Pulse: 72    SpO2: 99%    Weight: 74.4 kg (164 lb)    Height: 1.651 m (5' 5\")        Physical Exam  CONSTITUTIONAL - well nourished, well developed, looks like stated age, in no acute distress  SKIN - normal skin color and pigmentation. No rash, lesions, or nodules visualized  HEAD - no trauma, normocephalic  EYES - extraocular muscles are intact  ENT - atraumatic  NECK - no neck mass was observed  LUNGS - CTA B/L  CARDIAC - regular rate and regular rhythm  ABDOMEN - no organomegaly, soft, nontender, nondistended  EXTREMITIES - no edema, no deformities  MSK - moves limbs equally  NEUROLOGICAL - alert, oriented and no focal signs  PSYCHIATRIC - alert, pleasant and cordial, age-appropriate  IMMUNOLOGIC - no cervical lymphadenopathy     Assessment/Plan   85 yo female here for hospital follow up  Hospital follow up for TIA  Hospital notes, imaging reviewed  Continue plavix 75 mg, lisinopril 10 mg  Follow up " with neuro  BP follow up in 1 month  2. Overactive bladder  Urology referral    RTC in 1 month for BP check  Would like urology referral

## 2024-10-09 ENCOUNTER — OFFICE VISIT (OUTPATIENT)
Dept: PRIMARY CARE | Facility: CLINIC | Age: 84
End: 2024-10-09
Payer: MEDICARE

## 2024-10-09 VITALS
OXYGEN SATURATION: 96 % | BODY MASS INDEX: 27.32 KG/M2 | DIASTOLIC BLOOD PRESSURE: 75 MMHG | HEIGHT: 65 IN | WEIGHT: 164 LBS | SYSTOLIC BLOOD PRESSURE: 132 MMHG | HEART RATE: 92 BPM

## 2024-10-09 DIAGNOSIS — Z01.818 PRE-OP EXAM: Primary | ICD-10-CM

## 2024-10-09 PROCEDURE — 1123F ACP DISCUSS/DSCN MKR DOCD: CPT | Performed by: STUDENT IN AN ORGANIZED HEALTH CARE EDUCATION/TRAINING PROGRAM

## 2024-10-09 PROCEDURE — 1157F ADVNC CARE PLAN IN RCRD: CPT | Performed by: STUDENT IN AN ORGANIZED HEALTH CARE EDUCATION/TRAINING PROGRAM

## 2024-10-09 PROCEDURE — 1159F MED LIST DOCD IN RCRD: CPT | Performed by: STUDENT IN AN ORGANIZED HEALTH CARE EDUCATION/TRAINING PROGRAM

## 2024-10-09 PROCEDURE — 1125F AMNT PAIN NOTED PAIN PRSNT: CPT | Performed by: STUDENT IN AN ORGANIZED HEALTH CARE EDUCATION/TRAINING PROGRAM

## 2024-10-09 PROCEDURE — 1036F TOBACCO NON-USER: CPT | Performed by: STUDENT IN AN ORGANIZED HEALTH CARE EDUCATION/TRAINING PROGRAM

## 2024-10-09 PROCEDURE — 99213 OFFICE O/P EST LOW 20 MIN: CPT | Performed by: STUDENT IN AN ORGANIZED HEALTH CARE EDUCATION/TRAINING PROGRAM

## 2024-10-09 PROCEDURE — 1111F DSCHRG MED/CURRENT MED MERGE: CPT | Performed by: STUDENT IN AN ORGANIZED HEALTH CARE EDUCATION/TRAINING PROGRAM

## 2024-10-09 ASSESSMENT — PAIN SCALES - GENERAL: PAINLEVEL: 8

## 2024-10-09 NOTE — PROGRESS NOTES
"Subjective   Patient ID: Keisha Baxter is a 84 y.o. female who presents for Surgical clearance (Rt TKR).    HPI  85 yo female here for surgical clearance for R TKR  Surgical clearance  PMHx: Hx TIA, Overactive bladder, Hypothyroidism, HLD, HTN  Allergies: Penicillin, rxn: Hives  Surg: B/L hip replacements, Multiple hernia, colostomy  Social Hx: Hx of tobacco use, no current use  Meds: See Med list    Review of Systems  All pertinent positive symptoms are included in the history of present illness.    All other systems have been reviewed and are negative and noncontributory to this patient's current ailments.     Allergies   Allergen Reactions    Penicillins Hives and Unknown     hives    Shellfish Derived Hives        Immunization History   Administered Date(s) Administered    Pfizer COVID-19 vaccine, bivalent, age 12 years and older (30 mcg/0.3 mL) 09/19/2022    Pfizer Purple Cap SARS-CoV-2 01/29/2021, 02/27/2021, 10/05/2021       Objective   Vitals:    10/09/24 1123 10/09/24 1158   BP: 136/76 132/75   Pulse: 92    SpO2: 96%    Weight: 74.4 kg (164 lb)    Height: 1.651 m (5' 5\")        Physical Exam  CONSTITUTIONAL - well nourished, well developed, looks like stated age, in no acute distress  SKIN - normal skin color and pigmentation. No rash, lesions, or nodules visualized  HEAD - no trauma, normocephalic  EYES - extraocular muscles are intact  ENT - atraumatic  NECK - no neck mass was observed  LUNGS - CTA B/L  CARDIAC - regular rate and regular rhythm  ABDOMEN - no organomegaly, soft, nontender, nondistended  EXTREMITIES - no edema, no deformities  MSK - moves limbs equally  NEUROLOGICAL - alert, oriented and no focal signs  PSYCHIATRIC - alert, pleasant and cordial, age-appropriate  IMMUNOLOGIC - no cervical lymphadenopathy     Assessment/Plan   85 yo female here for surgical clearance  Surgical clearance  Pt cleared for Right TKR  "

## 2024-10-15 ENCOUNTER — HOSPITAL ENCOUNTER (OUTPATIENT)
Facility: HOSPITAL | Age: 84
Discharge: HOME | End: 2024-10-17
Attending: ORTHOPAEDIC SURGERY | Admitting: ORTHOPAEDIC SURGERY
Payer: COMMERCIAL

## 2024-10-15 ENCOUNTER — ANESTHESIA EVENT (OUTPATIENT)
Dept: OPERATING ROOM | Facility: HOSPITAL | Age: 84
End: 2024-10-15
Payer: COMMERCIAL

## 2024-10-15 ENCOUNTER — ANESTHESIA (OUTPATIENT)
Dept: OPERATING ROOM | Facility: HOSPITAL | Age: 84
End: 2024-10-15
Payer: COMMERCIAL

## 2024-10-15 ENCOUNTER — APPOINTMENT (OUTPATIENT)
Dept: RADIOLOGY | Facility: HOSPITAL | Age: 84
End: 2024-10-15
Payer: COMMERCIAL

## 2024-10-15 ENCOUNTER — PATIENT OUTREACH (OUTPATIENT)
Dept: PRIMARY CARE | Facility: CLINIC | Age: 84
End: 2024-10-15
Payer: COMMERCIAL

## 2024-10-15 DIAGNOSIS — N30.00 ACUTE CYSTITIS WITHOUT HEMATURIA: ICD-10-CM

## 2024-10-15 DIAGNOSIS — M17.11 ARTHRITIS OF RIGHT KNEE: Primary | ICD-10-CM

## 2024-10-15 PROCEDURE — 2500000004 HC RX 250 GENERAL PHARMACY W/ HCPCS (ALT 636 FOR OP/ED): Mod: JZ | Performed by: ORTHOPAEDIC SURGERY

## 2024-10-15 PROCEDURE — 2500000004 HC RX 250 GENERAL PHARMACY W/ HCPCS (ALT 636 FOR OP/ED)

## 2024-10-15 PROCEDURE — C1713 ANCHOR/SCREW BN/BN,TIS/BN: HCPCS | Performed by: ORTHOPAEDIC SURGERY

## 2024-10-15 PROCEDURE — 2720000007 HC OR 272 NO HCPCS: Performed by: ORTHOPAEDIC SURGERY

## 2024-10-15 PROCEDURE — C1776 JOINT DEVICE (IMPLANTABLE): HCPCS | Performed by: ORTHOPAEDIC SURGERY

## 2024-10-15 PROCEDURE — 7100000002 HC RECOVERY ROOM TIME - EACH INCREMENTAL 1 MINUTE: Performed by: ORTHOPAEDIC SURGERY

## 2024-10-15 PROCEDURE — 3700000001 HC GENERAL ANESTHESIA TIME - INITIAL BASE CHARGE: Performed by: ORTHOPAEDIC SURGERY

## 2024-10-15 PROCEDURE — 2500000004 HC RX 250 GENERAL PHARMACY W/ HCPCS (ALT 636 FOR OP/ED): Performed by: ANESTHESIOLOGY

## 2024-10-15 PROCEDURE — 2780000003 HC OR 278 NO HCPCS: Performed by: ORTHOPAEDIC SURGERY

## 2024-10-15 PROCEDURE — A6213 FOAM DRG >16<=48 SQ IN W/BDR: HCPCS | Performed by: ORTHOPAEDIC SURGERY

## 2024-10-15 PROCEDURE — 3600000018 HC OR TIME - INITIAL BASE CHARGE - PROCEDURE LEVEL SIX: Performed by: ORTHOPAEDIC SURGERY

## 2024-10-15 PROCEDURE — 73560 X-RAY EXAM OF KNEE 1 OR 2: CPT | Mod: RIGHT SIDE | Performed by: RADIOLOGY

## 2024-10-15 PROCEDURE — 73560 X-RAY EXAM OF KNEE 1 OR 2: CPT | Mod: RT

## 2024-10-15 PROCEDURE — 7100000001 HC RECOVERY ROOM TIME - INITIAL BASE CHARGE: Performed by: ORTHOPAEDIC SURGERY

## 2024-10-15 PROCEDURE — A9999 DME SUPPLY OR ACCESSORY, NOS: HCPCS | Mod: MUE | Performed by: ORTHOPAEDIC SURGERY

## 2024-10-15 PROCEDURE — 99222 1ST HOSP IP/OBS MODERATE 55: CPT | Performed by: NURSE PRACTITIONER

## 2024-10-15 PROCEDURE — 7100000011 HC EXTENDED STAY RECOVERY HOURLY - NURSING UNIT

## 2024-10-15 PROCEDURE — 2500000004 HC RX 250 GENERAL PHARMACY W/ HCPCS (ALT 636 FOR OP/ED): Performed by: ORTHOPAEDIC SURGERY

## 2024-10-15 PROCEDURE — 7100000024 HC EXTENDED STAY RECOVERY PER MINUTE- PACU: Performed by: ORTHOPAEDIC SURGERY

## 2024-10-15 PROCEDURE — 3600000017 HC OR TIME - EACH INCREMENTAL 1 MINUTE - PROCEDURE LEVEL SIX: Performed by: ORTHOPAEDIC SURGERY

## 2024-10-15 PROCEDURE — 2500000001 HC RX 250 WO HCPCS SELF ADMINISTERED DRUGS (ALT 637 FOR MEDICARE OP): Performed by: ORTHOPAEDIC SURGERY

## 2024-10-15 PROCEDURE — 3700000002 HC GENERAL ANESTHESIA TIME - EACH INCREMENTAL 1 MINUTE: Performed by: ORTHOPAEDIC SURGERY

## 2024-10-15 PROCEDURE — 2500000004 HC RX 250 GENERAL PHARMACY W/ HCPCS (ALT 636 FOR OP/ED): Performed by: NURSE PRACTITIONER

## 2024-10-15 PROCEDURE — 97161 PT EVAL LOW COMPLEX 20 MIN: CPT | Mod: GP

## 2024-10-15 PROCEDURE — 97165 OT EVAL LOW COMPLEX 30 MIN: CPT | Mod: GO

## 2024-10-15 PROCEDURE — 2500000005 HC RX 250 GENERAL PHARMACY W/O HCPCS: Performed by: ORTHOPAEDIC SURGERY

## 2024-10-15 PROCEDURE — 2500000005 HC RX 250 GENERAL PHARMACY W/O HCPCS

## 2024-10-15 PROCEDURE — A4649 SURGICAL SUPPLIES: HCPCS | Performed by: ORTHOPAEDIC SURGERY

## 2024-10-15 DEVICE — SIMPLEX® HV IS A FAST-SETTING ACRYLIC RESIN FOR USE IN BONE SURGERY. MIXING THE TWO SEPARATE STERILE COMPONENTS PRODUCES A DUCTILE BONE CEMENT WHICH, AFTER HARDENING, FIXES THE IMPLANT AND TRANSFERS STRESSES PRODUCED DURING MOVEMENT EVENLY TO THE BONE. SIMPLEX® HV CEMENT POWDER ALSO CONTAINS INSOLUBLE ZIRCONIUM DIOXIDE AS AN X-RAY CONTRAST MEDIUM. SIMPLEX® HV DOES NOT EMIT A SIGNAL AND DOES NOT POSE A SAFETY RISK IN A MAGNETIC RESONANCE ENVIRONMENT.
Type: IMPLANTABLE DEVICE | Site: KNEE | Status: FUNCTIONAL
Brand: SIMPLEX HV

## 2024-10-15 DEVICE — PRIMARY TIBIAL BASEPLATE
Type: IMPLANTABLE DEVICE | Site: KNEE | Status: FUNCTIONAL
Brand: TRIATHLON

## 2024-10-15 DEVICE — TIBIAL BEARING INSERT - PS
Type: IMPLANTABLE DEVICE | Site: KNEE | Status: FUNCTIONAL
Brand: TRIATHLON

## 2024-10-15 DEVICE — PATELLA
Type: IMPLANTABLE DEVICE | Site: KNEE | Status: FUNCTIONAL
Brand: TRIATHLON

## 2024-10-15 DEVICE — POSTERIOR STABILIZED FEMORAL
Type: IMPLANTABLE DEVICE | Site: KNEE | Status: FUNCTIONAL
Brand: TRIATHLON

## 2024-10-15 RX ORDER — ACETAMINOPHEN 500 MG
1000 TABLET ORAL EVERY 6 HOURS
Status: DISCONTINUED | OUTPATIENT
Start: 2024-10-15 | End: 2024-10-17 | Stop reason: HOSPADM

## 2024-10-15 RX ORDER — PROPOFOL 10 MG/ML
INJECTION, EMULSION INTRAVENOUS AS NEEDED
Status: DISCONTINUED | OUTPATIENT
Start: 2024-10-15 | End: 2024-10-15

## 2024-10-15 RX ORDER — CEFAZOLIN 1 G/1
INJECTION, POWDER, FOR SOLUTION INTRAVENOUS AS NEEDED
Status: DISCONTINUED | OUTPATIENT
Start: 2024-10-15 | End: 2024-10-15

## 2024-10-15 RX ORDER — SODIUM CHLORIDE, SODIUM LACTATE, POTASSIUM CHLORIDE, CALCIUM CHLORIDE 600; 310; 30; 20 MG/100ML; MG/100ML; MG/100ML; MG/100ML
100 INJECTION, SOLUTION INTRAVENOUS CONTINUOUS
Status: DISCONTINUED | OUTPATIENT
Start: 2024-10-15 | End: 2024-10-15 | Stop reason: HOSPADM

## 2024-10-15 RX ORDER — SIMVASTATIN 40 MG/1
40 TABLET, FILM COATED ORAL NIGHTLY
Status: DISCONTINUED | OUTPATIENT
Start: 2024-10-15 | End: 2024-10-17 | Stop reason: HOSPADM

## 2024-10-15 RX ORDER — LIDOCAINE HYDROCHLORIDE 10 MG/ML
0.1 INJECTION, SOLUTION INFILTRATION; PERINEURAL ONCE
Status: DISCONTINUED | OUTPATIENT
Start: 2024-10-15 | End: 2024-10-15 | Stop reason: HOSPADM

## 2024-10-15 RX ORDER — HYDRALAZINE HYDROCHLORIDE 20 MG/ML
INJECTION INTRAMUSCULAR; INTRAVENOUS AS NEEDED
Status: DISCONTINUED | OUTPATIENT
Start: 2024-10-15 | End: 2024-10-15

## 2024-10-15 RX ORDER — CEFAZOLIN SODIUM 2 G/100ML
2 INJECTION, SOLUTION INTRAVENOUS ONCE
Status: DISCONTINUED | OUTPATIENT
Start: 2024-10-15 | End: 2024-10-15 | Stop reason: HOSPADM

## 2024-10-15 RX ORDER — FENTANYL CITRATE 50 UG/ML
50 INJECTION, SOLUTION INTRAMUSCULAR; INTRAVENOUS EVERY 5 MIN PRN
Status: DISCONTINUED | OUTPATIENT
Start: 2024-10-15 | End: 2024-10-15 | Stop reason: HOSPADM

## 2024-10-15 RX ORDER — FENTANYL CITRATE 50 UG/ML
INJECTION, SOLUTION INTRAMUSCULAR; INTRAVENOUS AS NEEDED
Status: DISCONTINUED | OUTPATIENT
Start: 2024-10-15 | End: 2024-10-15

## 2024-10-15 RX ORDER — FENTANYL CITRATE 50 UG/ML
25 INJECTION, SOLUTION INTRAMUSCULAR; INTRAVENOUS EVERY 5 MIN PRN
Status: DISCONTINUED | OUTPATIENT
Start: 2024-10-15 | End: 2024-10-15 | Stop reason: HOSPADM

## 2024-10-15 RX ORDER — DESMOPRESSIN ACETATE 0.1 MG/1
0.2 TABLET ORAL DAILY
Status: DISCONTINUED | OUTPATIENT
Start: 2024-10-15 | End: 2024-10-17 | Stop reason: HOSPADM

## 2024-10-15 RX ORDER — DOCUSATE SODIUM 100 MG/1
100 CAPSULE, LIQUID FILLED ORAL 2 TIMES DAILY
Status: DISCONTINUED | OUTPATIENT
Start: 2024-10-15 | End: 2024-10-17 | Stop reason: HOSPADM

## 2024-10-15 RX ORDER — TRANEXAMIC ACID 100 MG/ML
INJECTION, SOLUTION INTRAVENOUS AS NEEDED
Status: DISCONTINUED | OUTPATIENT
Start: 2024-10-15 | End: 2024-10-15

## 2024-10-15 RX ORDER — MORPHINE SULFATE 2 MG/ML
2 INJECTION, SOLUTION INTRAMUSCULAR; INTRAVENOUS EVERY 2 HOUR PRN
Status: DISCONTINUED | OUTPATIENT
Start: 2024-10-15 | End: 2024-10-17 | Stop reason: HOSPADM

## 2024-10-15 RX ORDER — PREGABALIN 75 MG/1
75 CAPSULE ORAL ONCE
Status: COMPLETED | OUTPATIENT
Start: 2024-10-15 | End: 2024-10-15

## 2024-10-15 RX ORDER — PHENYLEPHRINE HCL IN 0.9% NACL 0.4MG/10ML
SYRINGE (ML) INTRAVENOUS AS NEEDED
Status: DISCONTINUED | OUTPATIENT
Start: 2024-10-15 | End: 2024-10-15

## 2024-10-15 RX ORDER — LEVOTHYROXINE SODIUM 100 UG/1
100 TABLET ORAL
Status: DISCONTINUED | OUTPATIENT
Start: 2024-10-16 | End: 2024-10-17 | Stop reason: HOSPADM

## 2024-10-15 RX ORDER — OXYCODONE HYDROCHLORIDE 5 MG/1
10 TABLET ORAL EVERY 4 HOURS PRN
Status: DISCONTINUED | OUTPATIENT
Start: 2024-10-15 | End: 2024-10-17 | Stop reason: HOSPADM

## 2024-10-15 RX ORDER — SODIUM CHLORIDE 9 MG/ML
75 INJECTION, SOLUTION INTRAVENOUS CONTINUOUS
Status: DISCONTINUED | OUTPATIENT
Start: 2024-10-15 | End: 2024-10-16 | Stop reason: SDUPTHER

## 2024-10-15 RX ORDER — OXYCODONE HCL 10 MG/1
20 TABLET, FILM COATED, EXTENDED RELEASE ORAL ONCE
Status: COMPLETED | OUTPATIENT
Start: 2024-10-15 | End: 2024-10-15

## 2024-10-15 RX ORDER — POLYETHYLENE GLYCOL 3350 17 G/17G
17 POWDER, FOR SOLUTION ORAL DAILY
Status: DISCONTINUED | OUTPATIENT
Start: 2024-10-15 | End: 2024-10-17 | Stop reason: HOSPADM

## 2024-10-15 RX ORDER — KETOROLAC TROMETHAMINE 30 MG/ML
15 INJECTION, SOLUTION INTRAMUSCULAR; INTRAVENOUS EVERY 6 HOURS PRN
Status: DISCONTINUED | OUTPATIENT
Start: 2024-10-15 | End: 2024-10-17 | Stop reason: HOSPADM

## 2024-10-15 RX ORDER — LIDOCAINE HYDROCHLORIDE 10 MG/ML
INJECTION, SOLUTION INFILTRATION; PERINEURAL AS NEEDED
Status: DISCONTINUED | OUTPATIENT
Start: 2024-10-15 | End: 2024-10-15

## 2024-10-15 RX ORDER — CEFAZOLIN SODIUM 2 G/100ML
2 INJECTION, SOLUTION INTRAVENOUS EVERY 8 HOURS
Status: COMPLETED | OUTPATIENT
Start: 2024-10-15 | End: 2024-10-16

## 2024-10-15 RX ORDER — OXYCODONE HYDROCHLORIDE 5 MG/1
5 TABLET ORAL EVERY 4 HOURS PRN
Status: DISCONTINUED | OUTPATIENT
Start: 2024-10-15 | End: 2024-10-17 | Stop reason: HOSPADM

## 2024-10-15 RX ORDER — ACETAMINOPHEN 325 MG/1
650 TABLET ORAL ONCE
Status: COMPLETED | OUTPATIENT
Start: 2024-10-15 | End: 2024-10-15

## 2024-10-15 RX ORDER — ONDANSETRON HYDROCHLORIDE 2 MG/ML
4 INJECTION, SOLUTION INTRAVENOUS EVERY 8 HOURS PRN
Status: DISCONTINUED | OUTPATIENT
Start: 2024-10-15 | End: 2024-10-17 | Stop reason: HOSPADM

## 2024-10-15 RX ORDER — METOPROLOL TARTRATE 1 MG/ML
INJECTION, SOLUTION INTRAVENOUS AS NEEDED
Status: DISCONTINUED | OUTPATIENT
Start: 2024-10-15 | End: 2024-10-15

## 2024-10-15 RX ORDER — CELECOXIB 200 MG/1
400 CAPSULE ORAL ONCE
Status: COMPLETED | OUTPATIENT
Start: 2024-10-15 | End: 2024-10-15

## 2024-10-15 RX ORDER — PROCHLORPERAZINE MALEATE 10 MG
10 TABLET ORAL EVERY 6 HOURS PRN
Status: DISCONTINUED | OUTPATIENT
Start: 2024-10-15 | End: 2024-10-17 | Stop reason: HOSPADM

## 2024-10-15 RX ORDER — PROCHLORPERAZINE EDISYLATE 5 MG/ML
10 INJECTION INTRAMUSCULAR; INTRAVENOUS EVERY 6 HOURS PRN
Status: DISCONTINUED | OUTPATIENT
Start: 2024-10-15 | End: 2024-10-17 | Stop reason: HOSPADM

## 2024-10-15 RX ORDER — FENTANYL CITRATE 50 UG/ML
50 INJECTION, SOLUTION INTRAMUSCULAR; INTRAVENOUS ONCE
Status: COMPLETED | OUTPATIENT
Start: 2024-10-15 | End: 2024-10-15

## 2024-10-15 RX ORDER — WATER
200 LIQUID (ML) MISCELLANEOUS
Status: DISCONTINUED | OUTPATIENT
Start: 2024-10-15 | End: 2024-10-17 | Stop reason: HOSPADM

## 2024-10-15 RX ORDER — BISMUTH SUBSALICYLATE 262 MG
1 TABLET,CHEWABLE ORAL DAILY
Status: DISCONTINUED | OUTPATIENT
Start: 2024-10-15 | End: 2024-10-17 | Stop reason: HOSPADM

## 2024-10-15 RX ORDER — SODIUM CHLORIDE, SODIUM LACTATE, POTASSIUM CHLORIDE, CALCIUM CHLORIDE 600; 310; 30; 20 MG/100ML; MG/100ML; MG/100ML; MG/100ML
20 INJECTION, SOLUTION INTRAVENOUS CONTINUOUS
Status: DISCONTINUED | OUTPATIENT
Start: 2024-10-15 | End: 2024-10-15

## 2024-10-15 RX ORDER — ASPIRIN 81 MG/1
81 TABLET ORAL 2 TIMES DAILY
Status: DISCONTINUED | OUTPATIENT
Start: 2024-10-15 | End: 2024-10-17 | Stop reason: HOSPADM

## 2024-10-15 RX ORDER — PROCHLORPERAZINE 25 MG/1
25 SUPPOSITORY RECTAL EVERY 12 HOURS PRN
Status: DISCONTINUED | OUTPATIENT
Start: 2024-10-15 | End: 2024-10-17 | Stop reason: HOSPADM

## 2024-10-15 RX ORDER — MIDAZOLAM HYDROCHLORIDE 1 MG/ML
2 INJECTION, SOLUTION INTRAMUSCULAR; INTRAVENOUS ONCE
Status: COMPLETED | OUTPATIENT
Start: 2024-10-15 | End: 2024-10-15

## 2024-10-15 RX ORDER — CLOPIDOGREL BISULFATE 75 MG/1
75 TABLET ORAL DAILY
Status: DISCONTINUED | OUTPATIENT
Start: 2024-10-15 | End: 2024-10-17 | Stop reason: HOSPADM

## 2024-10-15 SDOH — ECONOMIC STABILITY: FOOD INSECURITY: WITHIN THE PAST 12 MONTHS, YOU WORRIED THAT YOUR FOOD WOULD RUN OUT BEFORE YOU GOT THE MONEY TO BUY MORE.: NEVER TRUE

## 2024-10-15 SDOH — SOCIAL STABILITY: SOCIAL INSECURITY: HAVE YOU HAD THOUGHTS OF HARMING ANYONE ELSE?: NO

## 2024-10-15 SDOH — SOCIAL STABILITY: SOCIAL INSECURITY: ARE YOU MARRIED, WIDOWED, DIVORCED, SEPARATED, NEVER MARRIED, OR LIVING WITH A PARTNER?: MARRIED

## 2024-10-15 SDOH — ECONOMIC STABILITY: HOUSING INSECURITY: IN THE LAST 12 MONTHS, WAS THERE A TIME WHEN YOU WERE NOT ABLE TO PAY THE MORTGAGE OR RENT ON TIME?: NO

## 2024-10-15 SDOH — SOCIAL STABILITY: SOCIAL INSECURITY: WITHIN THE LAST YEAR, HAVE YOU BEEN HUMILIATED OR EMOTIONALLY ABUSED IN OTHER WAYS BY YOUR PARTNER OR EX-PARTNER?: NO

## 2024-10-15 SDOH — HEALTH STABILITY: PHYSICAL HEALTH: ON AVERAGE, HOW MANY MINUTES DO YOU ENGAGE IN EXERCISE AT THIS LEVEL?: 60 MIN

## 2024-10-15 SDOH — HEALTH STABILITY: PHYSICAL HEALTH
HOW OFTEN DO YOU NEED TO HAVE SOMEONE HELP YOU WHEN YOU READ INSTRUCTIONS, PAMPHLETS, OR OTHER WRITTEN MATERIAL FROM YOUR DOCTOR OR PHARMACY?: RARELY

## 2024-10-15 SDOH — HEALTH STABILITY: MENTAL HEALTH
DO YOU FEEL STRESS - TENSE, RESTLESS, NERVOUS, OR ANXIOUS, OR UNABLE TO SLEEP AT NIGHT BECAUSE YOUR MIND IS TROUBLED ALL THE TIME - THESE DAYS?: NOT AT ALL

## 2024-10-15 SDOH — SOCIAL STABILITY: SOCIAL INSECURITY
WITHIN THE LAST YEAR, HAVE YOU BEEN RAPED OR FORCED TO HAVE ANY KIND OF SEXUAL ACTIVITY BY YOUR PARTNER OR EX-PARTNER?: NO

## 2024-10-15 SDOH — SOCIAL STABILITY: SOCIAL INSECURITY: ABUSE: ADULT

## 2024-10-15 SDOH — ECONOMIC STABILITY: INCOME INSECURITY: IN THE PAST 12 MONTHS HAS THE ELECTRIC, GAS, OIL, OR WATER COMPANY THREATENED TO SHUT OFF SERVICES IN YOUR HOME?: NO

## 2024-10-15 SDOH — ECONOMIC STABILITY: FOOD INSECURITY: WITHIN THE PAST 12 MONTHS, THE FOOD YOU BOUGHT JUST DIDN'T LAST AND YOU DIDN'T HAVE MONEY TO GET MORE.: NEVER TRUE

## 2024-10-15 SDOH — HEALTH STABILITY: PHYSICAL HEALTH: ON AVERAGE, HOW MANY DAYS PER WEEK DO YOU ENGAGE IN MODERATE TO STRENUOUS EXERCISE (LIKE A BRISK WALK)?: 7 DAYS

## 2024-10-15 SDOH — ECONOMIC STABILITY: TRANSPORTATION INSECURITY: IN THE PAST 12 MONTHS, HAS LACK OF TRANSPORTATION KEPT YOU FROM MEDICAL APPOINTMENTS OR FROM GETTING MEDICATIONS?: NO

## 2024-10-15 SDOH — SOCIAL STABILITY: SOCIAL NETWORK: HOW OFTEN DO YOU ATTEND CHURCH OR RELIGIOUS SERVICES?: PATIENT DECLINED

## 2024-10-15 SDOH — SOCIAL STABILITY: SOCIAL NETWORK: HOW OFTEN DO YOU GET TOGETHER WITH FRIENDS OR RELATIVES?: MORE THAN THREE TIMES A WEEK

## 2024-10-15 SDOH — SOCIAL STABILITY: SOCIAL NETWORK
DO YOU BELONG TO ANY CLUBS OR ORGANIZATIONS SUCH AS CHURCH GROUPS, UNIONS, FRATERNAL OR ATHLETIC GROUPS, OR SCHOOL GROUPS?: YES

## 2024-10-15 SDOH — SOCIAL STABILITY: SOCIAL INSECURITY: WITHIN THE LAST YEAR, HAVE YOU BEEN AFRAID OF YOUR PARTNER OR EX-PARTNER?: NO

## 2024-10-15 SDOH — SOCIAL STABILITY: SOCIAL NETWORK: HOW OFTEN DO YOU ATTEND MEETINGS OF THE CLUBS OR ORGANIZATIONS YOU BELONG TO?: MORE THAN 4 TIMES PER YEAR

## 2024-10-15 SDOH — ECONOMIC STABILITY: HOUSING INSECURITY: AT ANY TIME IN THE PAST 12 MONTHS, WERE YOU HOMELESS OR LIVING IN A SHELTER (INCLUDING NOW)?: NO

## 2024-10-15 SDOH — SOCIAL STABILITY: SOCIAL INSECURITY: DO YOU FEEL UNSAFE GOING BACK TO THE PLACE WHERE YOU ARE LIVING?: NO

## 2024-10-15 SDOH — ECONOMIC STABILITY: FOOD INSECURITY: HOW HARD IS IT FOR YOU TO PAY FOR THE VERY BASICS LIKE FOOD, HOUSING, MEDICAL CARE, AND HEATING?: NOT HARD AT ALL

## 2024-10-15 SDOH — SOCIAL STABILITY: SOCIAL INSECURITY: WERE YOU ABLE TO COMPLETE ALL THE BEHAVIORAL HEALTH SCREENINGS?: YES

## 2024-10-15 SDOH — SOCIAL STABILITY: SOCIAL INSECURITY: ARE THERE ANY APPARENT SIGNS OF INJURIES/BEHAVIORS THAT COULD BE RELATED TO ABUSE/NEGLECT?: NO

## 2024-10-15 SDOH — SOCIAL STABILITY: SOCIAL INSECURITY: HAS ANYONE EVER THREATENED TO HURT YOUR FAMILY OR YOUR PETS?: NO

## 2024-10-15 SDOH — ECONOMIC STABILITY: HOUSING INSECURITY: IN THE PAST 12 MONTHS, HOW MANY TIMES HAVE YOU MOVED WHERE YOU WERE LIVING?: 0

## 2024-10-15 SDOH — HEALTH STABILITY: MENTAL HEALTH: CURRENT SMOKER: 0

## 2024-10-15 SDOH — SOCIAL STABILITY: SOCIAL INSECURITY: ARE YOU OR HAVE YOU BEEN THREATENED OR ABUSED PHYSICALLY, EMOTIONALLY, OR SEXUALLY BY ANYONE?: NO

## 2024-10-15 SDOH — SOCIAL STABILITY: SOCIAL INSECURITY: DO YOU FEEL ANYONE HAS EXPLOITED OR TAKEN ADVANTAGE OF YOU FINANCIALLY OR OF YOUR PERSONAL PROPERTY?: NO

## 2024-10-15 SDOH — SOCIAL STABILITY: SOCIAL INSECURITY: HAVE YOU HAD ANY THOUGHTS OF HARMING ANYONE ELSE?: NO

## 2024-10-15 SDOH — SOCIAL STABILITY: SOCIAL INSECURITY: DOES ANYONE TRY TO KEEP YOU FROM HAVING/CONTACTING OTHER FRIENDS OR DOING THINGS OUTSIDE YOUR HOME?: NO

## 2024-10-15 ASSESSMENT — PATIENT HEALTH QUESTIONNAIRE - PHQ9
2. FEELING DOWN, DEPRESSED OR HOPELESS: NOT AT ALL
1. LITTLE INTEREST OR PLEASURE IN DOING THINGS: NOT AT ALL
SUM OF ALL RESPONSES TO PHQ9 QUESTIONS 1 & 2: 0

## 2024-10-15 ASSESSMENT — COGNITIVE AND FUNCTIONAL STATUS - GENERAL
CLIMB 3 TO 5 STEPS WITH RAILING: TOTAL
STANDING UP FROM CHAIR USING ARMS: A LITTLE
TURNING FROM BACK TO SIDE WHILE IN FLAT BAD: A LITTLE
MOBILITY SCORE: 19
MOVING TO AND FROM BED TO CHAIR: A LOT
HELP NEEDED FOR BATHING: A LITTLE
TURNING FROM BACK TO SIDE WHILE IN FLAT BAD: A LITTLE
PERSONAL GROOMING: A LITTLE
TURNING FROM BACK TO SIDE WHILE IN FLAT BAD: A LOT
TOILETING: A LITTLE
CLIMB 3 TO 5 STEPS WITH RAILING: A LITTLE
HELP NEEDED FOR BATHING: A LITTLE
MOBILITY SCORE: 19
HELP NEEDED FOR BATHING: A LITTLE
DAILY ACTIVITIY SCORE: 22
DRESSING REGULAR LOWER BODY CLOTHING: A LOT
DAILY ACTIVITIY SCORE: 20
MOVING TO AND FROM BED TO CHAIR: A LITTLE
DAILY ACTIVITIY SCORE: 19
DRESSING REGULAR LOWER BODY CLOTHING: A LITTLE
STANDING UP FROM CHAIR USING ARMS: A LOT
CLIMB 3 TO 5 STEPS WITH RAILING: A LITTLE
DRESSING REGULAR UPPER BODY CLOTHING: A LITTLE
WALKING IN HOSPITAL ROOM: A LITTLE
DRESSING REGULAR UPPER BODY CLOTHING: A LITTLE
CLIMB 3 TO 5 STEPS WITH RAILING: A LITTLE
DRESSING REGULAR LOWER BODY CLOTHING: A LITTLE
WALKING IN HOSPITAL ROOM: A LITTLE
MOVING FROM LYING ON BACK TO SITTING ON SIDE OF FLAT BED WITH BEDRAILS: A LOT
PATIENT BASELINE BEDBOUND: NO
HELP NEEDED FOR BATHING: A LITTLE
MOVING TO AND FROM BED TO CHAIR: A LITTLE
DAILY ACTIVITIY SCORE: 20
MOBILITY SCORE: 11
TOILETING: A LITTLE
WALKING IN HOSPITAL ROOM: A LOT
STANDING UP FROM CHAIR USING ARMS: A LITTLE
TOILETING: A LITTLE
MOBILITY SCORE: 21
DRESSING REGULAR LOWER BODY CLOTHING: A LITTLE
WALKING IN HOSPITAL ROOM: A LITTLE
STANDING UP FROM CHAIR USING ARMS: A LITTLE

## 2024-10-15 ASSESSMENT — COLUMBIA-SUICIDE SEVERITY RATING SCALE - C-SSRS
2. HAVE YOU ACTUALLY HAD ANY THOUGHTS OF KILLING YOURSELF?: NO
6. HAVE YOU EVER DONE ANYTHING, STARTED TO DO ANYTHING, OR PREPARED TO DO ANYTHING TO END YOUR LIFE?: NO
1. IN THE PAST MONTH, HAVE YOU WISHED YOU WERE DEAD OR WISHED YOU COULD GO TO SLEEP AND NOT WAKE UP?: NO

## 2024-10-15 ASSESSMENT — PAIN SCALES - GENERAL
PAINLEVEL_OUTOF10: 0 - NO PAIN
PAINLEVEL_OUTOF10: 8
PAINLEVEL_OUTOF10: 0 - NO PAIN
PAIN_LEVEL: 0
PAINLEVEL_OUTOF10: 8
PAINLEVEL_OUTOF10: 0 - NO PAIN
PAINLEVEL_OUTOF10: 0 - NO PAIN
PAINLEVEL_OUTOF10: 6
PAINLEVEL_OUTOF10: 0 - NO PAIN
PAINLEVEL_OUTOF10: 6
PAINLEVEL_OUTOF10: 0 - NO PAIN

## 2024-10-15 ASSESSMENT — ACTIVITIES OF DAILY LIVING (ADL)
HEARING - LEFT EAR: FUNCTIONAL
ADL_ASSISTANCE: INDEPENDENT
FEEDING YOURSELF: INDEPENDENT
PATIENT'S MEMORY ADEQUATE TO SAFELY COMPLETE DAILY ACTIVITIES?: YES
TOILETING: INDEPENDENT
BATHING: NEEDS ASSISTANCE
LACK_OF_TRANSPORTATION: NO
WALKS IN HOME: INDEPENDENT
ADEQUATE_TO_COMPLETE_ADL: YES
DRESSING YOURSELF: INDEPENDENT
HEARING - RIGHT EAR: FUNCTIONAL
GROOMING: INDEPENDENT
BATHING_ASSISTANCE: MINIMAL
LACK_OF_TRANSPORTATION: NO
JUDGMENT_ADEQUATE_SAFELY_COMPLETE_DAILY_ACTIVITIES: YES

## 2024-10-15 ASSESSMENT — PAIN - FUNCTIONAL ASSESSMENT
PAIN_FUNCTIONAL_ASSESSMENT: FLACC (FACE, LEGS, ACTIVITY, CRY, CONSOLABILITY)
PAIN_FUNCTIONAL_ASSESSMENT: 0-10
PAIN_FUNCTIONAL_ASSESSMENT: FLACC (FACE, LEGS, ACTIVITY, CRY, CONSOLABILITY)
PAIN_FUNCTIONAL_ASSESSMENT: 0-10
PAIN_FUNCTIONAL_ASSESSMENT: FLACC (FACE, LEGS, ACTIVITY, CRY, CONSOLABILITY)
PAIN_FUNCTIONAL_ASSESSMENT: 0-10
PAIN_FUNCTIONAL_ASSESSMENT: FLACC (FACE, LEGS, ACTIVITY, CRY, CONSOLABILITY)
PAIN_FUNCTIONAL_ASSESSMENT: 0-10

## 2024-10-15 ASSESSMENT — LIFESTYLE VARIABLES
SKIP TO QUESTIONS 9-10: 1
HOW MANY STANDARD DRINKS CONTAINING ALCOHOL DO YOU HAVE ON A TYPICAL DAY: PATIENT DOES NOT DRINK
HOW OFTEN DO YOU HAVE A DRINK CONTAINING ALCOHOL: NEVER
AUDIT-C TOTAL SCORE: 0
SUBSTANCE_ABUSE_PAST_12_MONTHS: NO
PRESCIPTION_ABUSE_PAST_12_MONTHS: NO
AUDIT-C TOTAL SCORE: 0
HOW OFTEN DO YOU HAVE 6 OR MORE DRINKS ON ONE OCCASION: NEVER

## 2024-10-15 ASSESSMENT — PAIN DESCRIPTION - LOCATION: LOCATION: KNEE

## 2024-10-15 ASSESSMENT — PAIN DESCRIPTION - DESCRIPTORS
DESCRIPTORS: ACHING;DISCOMFORT
DESCRIPTORS: ACHING
DESCRIPTORS: ACHING

## 2024-10-15 NOTE — OP NOTE
OPEN TOTAL KNEE ARTHROPLASTY  [id number] - FL ARTHRP KNE CONDYLE&PLATU MEDIAL&LAT COMPARTMENTS   Operative Note     Date: 10/15/2024  OR Location: [facility] OR    Name: Keisha Baxter, : 1940, Age: 84 y.o., MRN: [id number], Sex: female    PRE Diagnosis  Right knee osteoarthritis    POST Diagnosis  Same    Procedures  Right total knee arthroplasty subvastus approach    Surgeons      * Dillon Phillips - Primary    Resident/Fellow/Other Assistant:  christo Grace'  The above-named assistant(s) was/were integral to all portions of the procedure including patient positioning, exposure, implantation, closure and transportation.     Procedure Summary  Implants:  Styker Cemented Triathlon Total Knee: Femur size 4 posterior stabilized, size 4 primary tibial baseplate, size A32 N2Vac Patella, size 4x10 posterior stabilized N2Vac tibial bearing insert     Anesthesia: General LMA with adductor canal block  ASA: III  Anesthesia Staff:   Anesthesiologist: You Aguilar MD  C-AA: BOYD Tran  Estimated Blood Loss: 100 mL    Specimen: No specimens collected     Staff:   Circulator: Shona Pina RN; Damari Duran RN  Scrub Person: Augusta Fisher. Sarah Negro; Siobhan Luciano         Drains and/or Catheters:   2 Hemovac    Tourniquet Times:     Total Tourniquet Time Documented:  Thigh (Right) - 5 minutes  Thigh (Right) - 56 minutes  Total: Thigh (Right) - 61 minutes             Findings: Severe degenerative change along the medial aspect with osteophyte formation.  Stable implantation of the above components.  Neutral alignment stable in extension to an axial loading exam.  Patella tracked normally through a subvastus approach.    Indications: Keisha Baxter is an 84 y.o. female who is having surgery for RIGHT KNEE OSTEOARTHRITIS.   Patient presented with pain and symptoms of her right knee progressive over time.  Not relieved with conservative care affecting activities of daily living.  She  had reached a point of disability conservative care not relieving symptoms.  X-rays revealed severe degenerative change of the medial joint space.  I discussed with her further options and she wished to proceed with a knee replacement surgery.  I discussed with the patient the risks, benefits, alternatives, complications of a total knee replacement.  The risks, including but not limited to, deep vein thrombosis, pulmonary embolism, infection, knee stiffness, periprosthetic fracture, damage to ligaments tendons or neurovascular structures was discussed.  Despite these risks they elected to proceed.      Procedure Details: Medications:  Antibiotic Ancef 2 g IV,Tranexamic acid 2 g IV, Decadron 10 milligrams IV    Patient was seen and evaluated in the preoperative area and the right leg was marked as the operative extremity.  They were then brought back to the operating room after anesthesia administered an adductor canal block.  Patient was laid supine on the table and anesthesia team controlled the head neck and airway administered general anesthetic.   The well leg was wrapped and secured to the table.  The operative leg was prepped with a well-padded thigh tourniquet.  We then washed the skin with a chlorhexidine wash and alcohol.  We then prepped with a ChloraPrep and draped in the usual fashion.  An operative time-out was performed.  I then exsanguinated the leg with an Esmarch and elevated the tourniquet to 250 millimeters of mercury.  Early in the case this was evidence of venous tourniquet as her systolic pressure was rising.  I dropped the tourniquet to obtain hemostasis to repack the knee and exsanguinated the leg.  I elevated the tourniquet to 300 mmHg.  This was stable for the remainder of the case.    I marked out the anterior landmarks of the knee and a midline incision.  I created the incision with the scalpel and used the Bovie to dissect down through the subcutaneous tissue to the level of the extensor  mechanism.  I created full-thickness skin flaps medial and laterally.  Identified the VMO and placed a Hohmann retractor deep to this to retract the extensor mechanism laterally.  Marked out the subvastus approach.  I created this with the scalpel.  I then used the Bovie to dissect a subperiosteal dissection off the proximal medial tibia.  I removed the infrapatellar fat pad.  I released the lateral synovium and was able to flex the knee and translate the patella laterally.  Patient had severe wear of the medial compartment.  I marked out Whitesides line and debrided the anterior cruciate ligament.  I debrided the intercondylar notch and used a drill to enter the femoral canal.  I then used a flexible intramedullary alignment guide with distal cutting jig set for 5 degree valgus with an 8 millimeter resection.  I set this on the more prominent lateral condyle.  I pinned this into position and protected the soft tissues while I  resected the distal femur.  My cut measured at 8 millimeters.   I now turned my attention to the tibia.  I used a extramedullary guide centered in the tibial spines.  I aligned it with respect to the medial 3rd of the tubercle, to tibial crest, and centered over talus.  I adjusted for a neutral slope.  I pinned the block in position with  a 2 millimeter resection off the medial side.  I then used a drop maureen as a secondary check.  I then protected the soft tissues and resected the proximal tibia.  I measured the lateral thickness at 8 millimeters.  I now brought the knee into extension and used the gap balancing block.  I was satisfied with the alignment and the extension gap.  Now returned to the femur using the 3 degree external rotation sizing guide and pinned this into position  with respect to Whitesides line, epicondylar axis and a tibial cut.  I measured the femur at a size 4. I placed the 4 in 1 cutting block and pinned this down.  I then resected the anterior and posterior condyles  and chamfers.  I then cleaned the bone cuts with a rongeur and an osteotome.  I now placed the box cutting guide in line with the lateral border of the femur.  I pinned this into position then used a distal chisel and oscillating saw to resect the box and removed the PCL with the Bovie.  I now clean the posterior aspect of the knee with the Bovie removing the medial and lateral meniscus in their entirety.  I preserved the popliteus tendon.  I then took a curved osteotome and resected the posterior condylar osteophytes bilaterally  decompressing the posterior space.  I provisionally sized the tibia to a size 4. I did a trial reduction with the femur, tibia and a 9 millimeter insert.   I was able to reduce the knee however she still felt tight through her range of motion.  I removed the trial implants protected the soft tissue and recut the proximal tibia by 2 additional millimeters.  I then trialed again with a 9 mm insert.  I brought the knee through range of motion was satisfied with the tracking and varus valgus stability throughout.  The native patella tracked normally.  I everted the patella with 2 towel clips.  I circumferentially burned around this with the Bovie.    The patella measured 22 in thickness. I took a freehand resection down to 12 millimeters of bone circumferentially.   I sized an asymmetric 32.  I alligned the drill guide with the medial border and drilled for the cemented component.  I then placed a trial and tested the kinematics and the patella tracked normally through the sub vastus approach.  I now removed the trial components and finished my tibial preparation.  I pinned the base plate into position and then used the keel punch and an oversized keel punched for the cemented technique.  I now removed this and thoroughly irrigated the soft tissue and bone in preparation for implantation.  I used standard cementation technique.  I flexed the knee and translated the tibia forward.  I finger  packed the cement into the keel and then seated the tibial component reducing the removing the extra cement.  I then finger packed cement onto the distal femur seated the femoral component and placed the trial polyethylene as the cement cured.  I also irrigated clean and dried the patella seated the patellar component. After cement had thoroughly cured I tested the kinematics with t a size 9 and then 11 millimeter insert the 9 felt to loosen extension and the 11 felt slightly tight along the medial aspect.  Therefore I settled on a size 10 mm insert.  I removed this insert trial, cleaned the locking mechanism and seated the posterior stabilized tibial bearing insert.  The fit was secure medial and laterally.  I extended the knee and irrigated with Betadine.  I washed this out with normal saline.  I packed the need and dropped the tourniquet to obtain hemostasis. I then placed 1 intra-articular drain and closed the arthrotomy in a semi flexed position with #2 Ethibond for a watertight seal tested through range of motion.  I then placed an extra-articular drain, irrigated and closed in a layered fashion of running 0 Vicryl, buried interrupted 2-0 Vicryl, running 4-0 Monocryl.  Dermabond and a silver dressing were applied.  The patient tolerated procedure well and was awakened taken to PACU in stable condition.  No complications encountered.  Complications:  None; patient tolerated the procedure well.    Disposition: PACU - hemodynamically stable.  Condition: stable             Attending Attestation: I performed the procedure.    Dillon Phillips  Phone Number: [phone]

## 2024-10-15 NOTE — CARE PLAN
Problem: Mobility  Goal: Bed mobility including supine to sit and sit to supine with supervision.  Outcome: Progressing

## 2024-10-15 NOTE — ANESTHESIA PROCEDURE NOTES
Peripheral Block    Patient location during procedure: pre-op  Start time: 10/15/2024 7:41 AM  End time: 10/15/2024 7:43 AM  Reason for block: at surgeon's request and post-op pain management  Staffing  Performed: attending   Authorized by: You Aguilar MD    Performed by: You Aguilar MD  Preanesthetic Checklist  Completed: patient identified, IV checked, site marked, risks and benefits discussed, surgical consent, monitors and equipment checked, pre-op evaluation and timeout performed   Timeout performed at: 10/15/2024 7:36 AM  Peripheral Block  Patient position: laying flat  Prep: ChloraPrep  Patient monitoring: heart rate, cardiac monitor and continuous pulse ox  Block type: adductor canal  Laterality: right  Injection technique: single-shot  Guidance: ultrasound guided  Local infiltration: bupivicaine  Infiltration strength: 0.5 %  Dose: 20 mL  Needle  Needle type: short-bevel   Needle gauge: 22 G  Needle length: 10 cm  Needle localization: ultrasound guidance     image stored in chart  Assessment  Injection assessment: negative aspiration for heme, no paresthesia on injection, incremental injection and local visualized surrounding nerve on ultrasound  Paresthesia pain: none  Heart rate change: no  Slow fractionated injection: no

## 2024-10-15 NOTE — ANESTHESIA PREPROCEDURE EVALUATION
"Patient: Keisha Baxter    Procedure Information       Date/Time: 10/15/24 0730    Procedure: OPEN TOTAL KNEE ARTHROPLASTY (Right: Knee)    Location: [facility] OR 08 / [facility]    Surgeons: Dillon Phillips MD            Visit Vitals  BP (!) 151/97   Pulse 79   Temp 36.3 °C (97.3 °F) (Temporal)   Resp 18   Ht 1.651 m (5' 5\")   Wt 74.4 kg (164 lb)   SpO2 96%   BMI 27.29 kg/m²   OB Status Hysterectomy   Smoking Status Former   BSA 1.85 m²        Current Outpatient Medications   Medication Instructions    cholecalciferol (VITAMIN D-3) 5,000 Units, oral, Daily RT    clopidogrel (PLAVIX) 75 mg, oral, Daily    cyanocobalamin, vitamin B-12, 5,000 mcg tablet, sublingual sublingual    desmopressin (DDAVP) 0.2 mg, oral, Daily    levothyroxine (SYNTHROID, LEVOXYL) 100 mcg, oral, Daily before breakfast    lisinopril 10 mg, oral, Daily    simvastatin (ZOCOR) 40 mg, oral, Nightly    traZODone (DESYREL) 50 mg, oral, Nightly PRN        Allergies   Allergen Reactions    Penicillins Hives and Unknown     hives    Shellfish Derived Hives        Past Surgical History:   Procedure Laterality Date    COLOSTOMY      HERNIA REPAIR      TOTAL HIP ARTHROPLASTY Bilateral 2019        Relevant Problems   Neuro   (+) Shingles (herpes zoster) polyneuropathy      /Renal   (+) Low sodium levels       Active Ambulatory Problems     Diagnosis Date Noted    History of hip replacement, total 01/15/2024    Low magnesium level 05/06/2020    Low sodium levels 12/23/2019    Right hip pain 06/11/2020    Right lower quadrant pain 01/15/2024    Shingles (herpes zoster) polyneuropathy 12/06/2019    Vitamin B 12 deficiency 05/06/2020    Vitamin D deficiency 05/06/2020     Resolved Ambulatory Problems     Diagnosis Date Noted    Acquired hypothyroidism 12/06/2019    Mixed hyperlipidemia 12/06/2019    Dizziness 10/03/2024    Left sided numbness 10/03/2024    Chronic pain of right knee 10/03/2024    TIA (transient ischemic attack) 10/03/2024    Elevated blood " pressure reading without diagnosis of hypertension 10/03/2024     Past Medical History:   Diagnosis Date    Arthritis     Disease of thyroid gland     Hyperlipidemia        Clinical information reviewed:   Tobacco  Allergies  Meds   Med Hx  Surg Hx   Fam Hx  Soc Hx        NPO Detail:    NPO/Void Status  Carbohydrate Drink Given Prior to Surgery? : N  Date of Last Liquid: 10/14/24  Time of Last Liquid: 2100  Date of Last Solid: 10/14/24  Time of Last Solid: 2100  Last Intake Type: Clear fluids  Time of Last Void: 0600         Physical Exam    Airway  Mallampati: II  TM distance: >3 FB  Neck ROM: limited     Cardiovascular - normal exam     Dental   (+) upper dentures     Pulmonary - normal exam     Abdominal - normal exam         Patient stopped plavix two days ago, ok to proceed per surgeon. Peripheral nerve block is not contra-indicated.    Anesthesia Plan    History of general anesthesia?: yes  History of complications of general anesthesia?: no    ASA 3     general and regional   (LMA, adductor canal nerve block)  The patient is not a current smoker.    intravenous induction   Anesthetic plan and risks discussed with patient.    Plan discussed with CRNA and CAA.

## 2024-10-15 NOTE — NURSING NOTE
Patient stated zofran helped with nausea. PT and OT attempted to it patient on side of bed and patient began vomiting. Patient states she feels okay as long as she is not moving but weary to take oral medications at this time. Call light within reach.

## 2024-10-15 NOTE — ANESTHESIA PROCEDURE NOTES
Airway  Date/Time: 10/15/2024 7:54 AM  Urgency: elective    Airway not difficult    Staffing  Performed: BOYD   Authorized by: You Aguilar MD    Performed by: BOYD Tran  Patient location during procedure: OR    Indications and Patient Condition  Indications for airway management: anesthesia  Spontaneous Ventilation: absent  Sedation level: deep  Preoxygenated: yes  MILS not maintained throughout  Mask difficulty assessment: 0 - not attempted    Final Airway Details  Final airway type: supraglottic airway      Successful airway: Supraglottic airway: AMBU AURASTRAIGHT.  Size 4     Number of attempts at approach: 1  Number of other approaches attempted: 0

## 2024-10-15 NOTE — PRE-PROCEDURE NOTE
PREOP NERVE BLOCK COMPLETED 0741-43 BY DR LITTLE, PATIENT TOLERATED WELL. PHOTO TO CHART, OR TEAM AT BEDSIDE

## 2024-10-15 NOTE — CARE PLAN
The patient's goals for the shift include work with PT/OT    The clinical goals for the shift include  pain control

## 2024-10-15 NOTE — ANESTHESIA POSTPROCEDURE EVALUATION
Patient: Keisha Baxter    Procedure Summary       Date: 10/15/24 Room / Location: [facility] OR 08 / [facility]    Anesthesia Start: 0747 Anesthesia Stop: 1005    Procedure: OPEN TOTAL KNEE ARTHROPLASTY (Right: Knee) Diagnosis:       Primary osteoarthritis of right knee      (RIGHT KNEE OSTEOARTHRITIS)    Surgeons: Dillon Phillips MD Responsible Provider: You Aguilar MD    Anesthesia Type: general ASA Status: 3            Anesthesia Type: general    Vitals Value Taken Time   /63 10/15/24 1005   Temp 36.0 10/15/24 1005   Pulse 68 10/15/24 1005   Resp 16 10/15/24 1005   SpO2 96 10/15/24 1005       Anesthesia Post Evaluation    Patient location during evaluation: PACU  Patient participation: complete - patient participated  Level of consciousness: sleepy but conscious  Pain score: 0  Pain management: adequate  Multimodal analgesia pain management approach  Airway patency: patent  Cardiovascular status: acceptable  Respiratory status: acceptable and face mask  Hydration status: acceptable  Postoperative Nausea and Vomiting: none        There were no known notable events for this encounter.

## 2024-10-15 NOTE — CONSULTS
Inpatient consult to Medicine  Consult performed by: JAS Ramirez-CNP  Consult ordered by: Dillon Phillips MD          Reason For Consult  Medical management, hypothyroidism, hyperlipidemia, hypertension, recent TIA    History Of Present Illness  Keisha Baxter is a 84 y.o. female with a past medical history of recent TIA, hyperlipidemia, and hypothyroidism who presented to [facility] for an anticipated right total knee replacement.  Patient had been having right knee pain for some time.  Noted increased difficulty walking long distance without having pain.  She did have recent hospital admission for numbness and tingling on her left side.  However no complaints of numbness or tingling on her right lower extremity.  She was recently discharged from this hospital on 10/4/2024.  At that time she was diagnosed with having a TIA.  Seen by neurology at that time who recommended Plavix.  She is on aspirin and statin as well.  She was discharged home in stable condition with instructions to follow-up with neurology outpatient.  She was seen by her primary care physician on 10/9/2024 who cleared her for surgery.  Patient underwent right total knee arthroplasty subvastus approach on 10/15/2024 by Dr. Phillips.  Medicine was consulted to manage acute and chronic medical conditions, recent TIA, hypothyroidism, hyperlipidemia.  Patient did report some nausea and vomiting prior to coming to the floor.  Denies chest pain, shortness of breath, fevers, chills, or abdominal discomfort.     Past Medical History  She has a past medical history of Arthritis, Disease of thyroid gland, Hyperlipidemia, and TIA (transient ischemic attack).    Surgical History  She has a past surgical history that includes Total hip arthroplasty (Bilateral, 2019); Hernia repair; and Colostomy.     Social History  She reports that she quit smoking about 20 years ago. Her smoking use included cigarettes. She started smoking about 68 years ago. She  has a 48 pack-year smoking history. She has never used smokeless tobacco. She reports current alcohol use of about 3.0 standard drinks of alcohol per week. She reports that she does not use drugs.    Family History  No family history on file.     Allergies  Penicillins and Shellfish derived    Review of Systems  As per HPI.  At least 10 systems reviewed and are otherwise negative.     Physical Exam  General: Drowsy.  Limited historian.  No acute distress..  HEENT: PERRL. EOMI. Pink conjunctiva. Trachea midline.  Cardiovascular: S1, S2. RRR. No edema.  Respiratory: Breath sounds clear, diminished bilaterally, posterior anterior chest. No cyanosis. No hypoxia.  GI: Abdomen soft, nontender. Bowel sounds present in all quadrants .   : No bladder distention.  MS: Right surgical knee  Neuro: Alert, oriented.   Skin: Warm, dry.  Surgical dressing intact.         Last Recorded Vitals  /79 (BP Location: Left arm, Patient Position: Lying)   Pulse 92   Temp 36.5 °C (97.7 °F) (Oral)   Resp 15   Wt 74.4 kg (164 lb)   SpO2 98%     Relevant Results  Lab Results   Component Value Date    GLUCOSE 118 (H) 10/04/2024    CALCIUM 9.2 10/04/2024     10/04/2024    K 4.0 10/04/2024    CO2 24 10/04/2024     10/04/2024    BUN 16 10/04/2024    CREATININE 0.61 10/04/2024      Lab Results   Component Value Date    WBC 7.2 10/04/2024    HGB 14.9 10/04/2024    HCT 44.5 10/04/2024    MCV 90 10/04/2024     10/04/2024    XR knee right 1-2 views  Result Date: 10/15/2024  No hardware failure about the right total knee arthroplasty   MACRO: None   Signed by: Juan Manuel Youngblood 10/15/2024 10:55 AM Dictation workstation:   [id number]     Assessment/Plan     Recent TIA  Continue statin, Plavix resumed per Ortho  Started on aspirin twice daily per orthopedic surgery for DVT prophylaxis  Follow-up with neurology outpatient    Right knee osteoarthritis  Status post right total knee arthroplasty subvastus approach by Dr. Phillips on  10/15/2024  Pain management  Incentive spirometer  PT/OT/out of bed  DVT prophylaxis  Orthopedic surgery managing    Hypertension  Recently diagnosed  Started on lisinopril during last hospitalization  Monitor blood pressure  Will hold lisinopril for now, likely resume tomorrow    Mixed hyperlipidemia  Resume home statin    Nausea and vomiting  As needed Zofran    Hypothyroidism  Resume home levothyroxine    Plan  Pain management/bowel regimen  Encourage incentive spirometer  PT/OT  DVT prophylaxis  Discharge per orthopedic surgery  CBC and BMP in the morning    Thank you so much for this consult.  Will continue to follow.    Carly Sanders, APRN-CNP

## 2024-10-15 NOTE — PROGRESS NOTES
Physical Therapy    Physical Therapy Evaluation & Treatment    Patient Name: Keisha Baxter  MRN: [id number]  Department: [address]  Room: Count includes the [facility]449-  Today's Date: 10/15/2024   Time Calculation  Start Time: 1503  Stop Time: 1523  Time Calculation (min): 20 min    Assessment/Plan   PT Assessment  PT Assessment Results: Decreased strength, Decreased range of motion, Decreased endurance, Impaired balance, Decreased mobility, Decreased safety awareness  Rehab Prognosis: Good  Evaluation/Treatment Tolerance: Patient limited by fatigue (nausea, vomiting)  End of Session Communication: Bedside nurse  Assessment Comment: 84 year old female presents with decline from baseline functional mobility, impaired balance, decreased tolerance to activity, and decreased safety awareness regarding total knee precautions;  patient would benefit from skilled physical therapy services to safey maximize functional mobility to modified independent levels.  End of Session Patient Position: Bed, 3 rail up, Alarm on   IP OR SWING BED PT PLAN  Inpatient or Swing Bed: Inpatient  PT Plan  Treatment/Interventions: Bed mobility, Transfer training, Gait training, Balance training, Strengthening, Endurance training, Range of motion, Therapeutic exercise, Therapeutic activity, Home exercise program  PT Plan: Ongoing PT  PT Frequency: BID  PT Discharge Recommendations: Low intensity level of continued care  PT Recommended Transfer Status: Assist x2  PT - OK to Discharge: Yes (with skilled physical therapy services at next level of care)      Subjective     General Visit Information:  General  Reason for Referral: Impaired mobility with arthritis right knee;  s/p rigth TKA 10/15/2024  Past Medical History Relevant to Rehab: Hypothyroid, arthritis, hyperlipid, marcia THR  Co-Treatment: OT  Co-Treatment Reason: Limited tolerance to activity POD #0  Prior to Session Communication: Bedside nurse  Patient Position Received: Bed, 3 rail up, Alarm on  General  Comment: 84 year old female admit from home for right knee surgery.  Home Living:  Home Living  Type of Home: House  Lives With: Spouse  Home Adaptive Equipment: Walker rolling or standard, Reacher  Home Layout: One level  Home Access: Level entry  Bathroom Shower/Tub: Walk-in shower  Bathroom Equipment: Grab bars in shower  Prior Level of Function:  Prior Function Per Pt/Caregiver Report  Ambulatory Assistance: Independent  Precautions:  Precautions  LE Weight Bearing Status: Weight Bearing as Tolerated  Medical Precautions: Fall precautions  Post-Surgical Precautions: Right total knee precautions        Objective   Pain:  Pain Assessment  Pain Assessment: 0-10  0-10 (Numeric) Pain Score: 6  Pain Type: Surgical pain  Pain Location: Knee  Pain Orientation: Right  Cognition:  Cognition  Overall Cognitive Status: Within Functional Limits    General Assessments:  General Observation  General Observation: Nausea and vomiting limiting tolerance to activity               Activity Tolerance  Endurance: Decreased tolerance for upright activites  Activity Tolerance Comments: Nausea, vomiting, fatigue    Sensation  Sensation Comment: Denies numbness marcia LE       Coordination  Movements are Fluid and Coordinated: No  Lower Body Coordination: Slower rate of movement marcia LE (right greater than left)    Static Sitting Balance  Static Sitting-Balance Support: Bilateral upper extremity supported  Static Sitting-Level of Assistance: Close supervision  Static Sitting-Comment/Number of Minutes: Supervision with balance while sitting on side of bed       Functional Assessments:  Bed Mobility  Bed Mobility: Yes  Bed Mobility 1  Bed Mobility 1: Supine to sitting  Level of Assistance 1: Moderate assistance  Bed Mobility Comments 1: Assist with trunk-up and right LE;  increased time and effort required to achieve supine to sit  Bed Mobility 2  Bed Mobility  2: Sitting to supine  Level of Assistance 2: Moderate assistance  Bed Mobility  Comments 2: Assist with right LE during sit to supine    Transfers  Transfer: No    Ambulation/Gait Training  Ambulation/Gait Training Performed: No    Stairs  Stairs: No  Extremity/Trunk Assessments:  RLE   RLE : Exceptions to WFL  AROM RLE (degrees)  R Knee Flexion 0-130: 20  Strength RLE  R Hip Flexion: 2/5  R Knee Extension: 2/5  R Ankle Dorsiflexion: 3/5     Treatments:  Therapeutic Exercise  Therapeutic Exercise Performed: Yes  Therapeutic Exercise Activity 1: Ankle pumps, heel slides, SLR 5 reps x 1 set with assist right LE              Bed Mobility  Bed Mobility: Yes  Bed Mobility 1  Bed Mobility 1: Supine to sitting  Level of Assistance 1: Moderate assistance  Bed Mobility Comments 1: Assist with trunk-up and right LE;  increased time and effort required to achieve supine to sit  Bed Mobility 2  Bed Mobility  2: Sitting to supine  Level of Assistance 2: Moderate assistance  Bed Mobility Comments 2: Assist with right LE during sit to supine    Ambulation/Gait Training  Ambulation/Gait Training Performed: No  Transfers  Transfer: No    Stairs  Stairs: No       Outcome Measures:  [facility] Basic Mobility  Turning from your back to your side while in a flat bed without using bedrails: A lot  Moving from lying on your back to sitting on the side of a flat bed without using bedrails: A lot  Moving to and from bed to chair (including a wheelchair): A lot  Standing up from a chair using your arms (e.g. wheelchair or bedside chair): A lot  To walk in hospital room: A lot  Climbing 3-5 steps with railing: Total  Basic Mobility - Total Score: 11    Encounter Problems       Encounter Problems (Active)       Mobility       Bed mobility including supine to sit and sit to supine with supervision. (Progressing)       Start:  10/15/24    Expected End:  10/29/24            Transfers including sit to stand and stand to sit with supervision.       Start:  10/15/24    Expected End:  10/29/24            Ambulate 60 feet with rolling  walker and supervision.       Start:  10/15/24    Expected End:  10/29/24               Pain - Adult              Education Documentation  Home Exercise Program, taught by Clint Gray, PT at 10/15/2024  4:36 PM.  Learner: Patient  Readiness: Acceptance  Method: Explanation, Demonstration  Response: Needs Reinforcement    Precautions, taught by Clint Gray, PT at 10/15/2024  4:36 PM.  Learner: Patient  Readiness: Acceptance  Method: Explanation, Demonstration  Response: Needs Reinforcement    Mobility Training, taught by Clint Gray PT at 10/15/2024  4:36 PM.  Learner: Patient  Readiness: Acceptance  Method: Explanation, Demonstration  Response: Needs Reinforcement    Education Comments  No comments found.

## 2024-10-15 NOTE — PERIOPERATIVE NURSING NOTE
Patient received to [facility] [place] # 5 from OR. Anesthesia at bedside. Report received. Initial assessment complete. VSS on Cardiac Monitor. Patient appears comfortable at present. No signs or symptoms of pain noted at this time.

## 2024-10-15 NOTE — PROGRESS NOTES
Occupational Therapy    Evaluation    Patient Name: Keisha Baxter  MRN: [id number]  Department: [facility]  Room: [facility]449-  Today's Date: 10/15/2024  Time Calculation  Start Time: 1500  Stop Time: 1520  Time Calculation (min): 20 min        Assessment:  OT Assessment: pt presents with R knee precautions, generalized weakness and decreased standing tolerance/balance which impedes ADL performance. pt would benefit from skilled OT services to address these deficits and to facilitate highest level of independence.  Prognosis: Good  Barriers to Discharge: None  Evaluation/Treatment Tolerance: Treatment limited secondary to medical complications (Comment)  Medical Staff Made Aware: Yes  End of Session Communication: Bedside nurse  End of Session Patient Position: Bed, 3 rail up, Alarm on  OT Assessment Results: Decreased ADL status, Decreased endurance, Decreased functional mobility  Prognosis: Good  Barriers to Discharge: None  Evaluation/Treatment Tolerance: Treatment limited secondary to medical complications (Comment)  Medical Staff Made Aware: Yes  Strengths: Ability to acquire knowledge, Attitude of self  Barriers to Participation: Comorbidities  Plan:  Treatment Interventions: ADL retraining, Functional transfer training, UE strengthening/ROM, Endurance training, Equipment evaluation/education, Compensatory technique education  OT Frequency: 5 times per week  OT Discharge Recommendations: Low intensity level of continued care  Equipment Recommended upon Discharge: Wheeled walker  OT Recommended Transfer Status: Assist of 1  OT - OK to Discharge: Yes  Treatment Interventions: ADL retraining, Functional transfer training, UE strengthening/ROM, Endurance training, Equipment evaluation/education, Compensatory technique education    Subjective     General:  General  Reason for Referral: ADL impairment; 84 yr old female POD 0 of R TKA  Past Medical History Relevant to Rehab: history of hypothyroid and hyperlipidemia,   bilateral hip replacements in 2019.  Family/Caregiver Present: No  Co-Treatment: PT  Co-Treatment Reason: Same day surgery; to maximize safety and participation  Prior to Session Communication: Bedside nurse  Patient Position Received: Bed, 3 rail up, Alarm on  Preferred Learning Style: verbal, visual  General Comment: pt agreeable and pleasant however did become nauseous upon sitting edge of bed and ended up vomiting.  Precautions:  LE Weight Bearing Status: Weight Bearing as Tolerated  Medical Precautions: Fall precautions, Oxygen therapy device and L/min (4L)  Post-Surgical Precautions: Right total knee precautions       Objective   Cognition:  Overall Cognitive Status: Within Functional Limits  Orientation Level: Oriented X4           Home Living:  Type of Home: House  Lives With: Spouse  Home Adaptive Equipment: Walker rolling or standard, Cane, Crutches  Home Layout: One level  Home Access: Level entry  Bathroom Shower/Tub: Walk-in shower  Bathroom Toilet: Standard  Bathroom Equipment: Grab bars in shower  Bathroom Accessibility: on first floor  Prior Function:  Level of [place]: Independent with ADLs and functional transfers, Independent with homemaking with ambulation  ADL Assistance: Independent  Homemaking Assistance: Independent  Ambulatory Assistance: Independent  Vocational: Retired  Leisure: [employer]), golfs     ADL:  Eating Assistance: Independent (anticipated)  Grooming Assistance: Minimal (anticipated)  Bathing Assistance: Minimal (anticipated)  UE Dressing Assistance: Independent (anticipated)  LE Dressing Assistance: Moderate (anticipated)  Toileting Assistance with Device: Minimal (anticipated)  ADL Comments: Score based off clinical observation and performance; limited by nausea/vomiting on this date (post-op 0)  Activity Tolerance:     Bed Mobility/Transfers: Bed Mobility  Bed Mobility: Yes  Bed Mobility 1  Bed Mobility 1: Supine to sitting  Level of Assistance 1: Moderate  assistance  Bed Mobility Comments 1: MOD A for managing RLE to edge of bed. pt able to manage LLE and trunk without assistance; effortful due to RLE weakness  Bed Mobility 2  Bed Mobility  2: Sitting to supine  Level of Assistance 2: Moderate assistance  Bed Mobility Comments 2: MOD A for lifting RLE back into bed, pt able to manage LLE and trunk w/o assistance    Transfers  Transfer: No (transfers/standing deferred at this time due to nausea and vomiting upon sitting edge of bed)    Sitting Balance:  Static Sitting Balance  Static Sitting-Balance Support: Feet supported, Bilateral upper extremity supported  Static Sitting-Level of Assistance: Close supervision  Static Sitting-Comment/Number of Minutes: pt tolerated sitting EOB for ~3 minutes total however had quick onset of nausea and vomiting requiring urgency to lay back down      Vision:Vision - Basic Assessment  Current Vision: Wears glasses only for reading  Sensation:  Light Touch: No apparent deficits  Strength:  Strength Comments: BUE grossly 4/5  Perception:  Inattention/Neglect: Appears intact  Coordination:  Movements are Fluid and Coordinated: Yes   Hand Function:  Gross Grasp: Functional  Coordination: Functional  Extremities: RUE   RUE : Within Functional Limits and LUE   LUE: Within Functional Limits      Outcome Measures:[facility] Daily Activity  Putting on and taking off regular lower body clothing: A lot  Bathing (including washing, rinsing, drying): A little  Putting on and taking off regular upper body clothing: None  Toileting, which includes using toilet, bedpan or urinal: A little  Taking care of personal grooming such as brushing teeth: A little  Eating Meals: None  Daily Activity - Total Score: 19        Education Documentation  Body Mechanics, taught by Charli Garcia OT at 10/15/2024  4:22 PM.  Learner: Patient  Readiness: Acceptance  Method: Demonstration, Explanation  Response: Needs Reinforcement    Precautions, taught by Charli Garcia  OT at 10/15/2024  4:22 PM.  Learner: Patient  Readiness: Acceptance  Method: Demonstration, Explanation  Response: Needs Reinforcement    ADL Training, taught by Charli Garcia OT at 10/15/2024  4:22 PM.  Learner: Patient  Readiness: Acceptance  Method: Demonstration, Explanation  Response: Needs Reinforcement    Education Comments  No comments found.        OP EDUCATION:       Goals:  Encounter Problems       Encounter Problems (Active)       ADLs       Patient with complete lower body dressing with modified independent level of assistance donning and doffing all LE clothes  with PRN adaptive equipment while edge of bed  (Progressing)       Start:  10/15/24    Expected End:  11/15/24            Patient will complete daily grooming tasks with independent level of assistance and PRN adaptive equipment while standing. (Progressing)       Start:  10/15/24    Expected End:  11/15/24            Patient will complete toileting including hygiene clothing management/hygiene with modified independent level of assistance and raised toilet seat and grab bars. (Progressing)       Start:  10/15/24    Expected End:  11/15/24               COGNITION/SAFETY       Patient will recall and adhere to knee precautions during all functional mobility/ADL tasks in order to demonstrate improved understanding and promote healing post op (Progressing)       Start:  10/15/24    Expected End:  11/15/24               MOBILITY       Patient will perform Functional mobility max Household distances/Community Distances with modified independent level of assistance and least restrictive device in order to improve safety and functional mobility. (Progressing)       Start:  10/15/24    Expected End:  11/15/24

## 2024-10-15 NOTE — NURSING NOTE
Patient arrived to unit, assisted in getting her settled. Patient complaining of nausea and pain rating it an 8/10. Patient was medicated and a fresh ice pack placed on Rt knee. Patient educated on pain management, PT, ambulation, and fall prevention. Verbally agreeable and call light in reach.

## 2024-10-16 ENCOUNTER — HOME HEALTH ADMISSION (OUTPATIENT)
Dept: HOME HEALTH SERVICES | Facility: HOME HEALTH | Age: 84
End: 2024-10-16
Payer: MEDICARE

## 2024-10-16 ENCOUNTER — APPOINTMENT (OUTPATIENT)
Dept: CARDIOLOGY | Facility: HOSPITAL | Age: 84
End: 2024-10-16
Payer: COMMERCIAL

## 2024-10-16 LAB
ANION GAP SERPL CALCULATED.3IONS-SCNC: 9 MMOL/L (ref 10–20)
APPEARANCE UR: CLEAR
BILIRUB UR STRIP.AUTO-MCNC: NEGATIVE MG/DL
BUN SERPL-MCNC: 19 MG/DL (ref 6–23)
CALCIUM SERPL-MCNC: 8.8 MG/DL (ref 8.6–10.3)
CHLORIDE SERPL-SCNC: 103 MMOL/L (ref 98–107)
CO2 SERPL-SCNC: 26 MMOL/L (ref 21–32)
COLOR UR: YELLOW
CREAT SERPL-MCNC: 0.71 MG/DL (ref 0.5–1.05)
EGFRCR SERPLBLD CKD-EPI 2021: 84 ML/MIN/1.73M*2
ERYTHROCYTE [DISTWIDTH] IN BLOOD BY AUTOMATED COUNT: 13.1 % (ref 11.5–14.5)
GLUCOSE SERPL-MCNC: 128 MG/DL (ref 74–99)
GLUCOSE UR STRIP.AUTO-MCNC: NORMAL MG/DL
HCT VFR BLD AUTO: 35.7 % (ref 36–46)
HCT VFR BLD AUTO: 35.8 % (ref 36–46)
HGB BLD-MCNC: 11.5 G/DL (ref 12–16)
HGB BLD-MCNC: 11.8 G/DL (ref 12–16)
HOLD SPECIMEN: NORMAL
HYALINE CASTS #/AREA URNS AUTO: ABNORMAL /LPF
IRON SATN MFR SERPL: 9 % (ref 25–45)
IRON SERPL-MCNC: 24 UG/DL (ref 35–150)
KETONES UR STRIP.AUTO-MCNC: ABNORMAL MG/DL
LEUKOCYTE ESTERASE UR QL STRIP.AUTO: ABNORMAL
MCH RBC QN AUTO: 31 PG (ref 26–34)
MCHC RBC AUTO-ENTMCNC: 33.1 G/DL (ref 32–36)
MCV RBC AUTO: 94 FL (ref 80–100)
MUCOUS THREADS #/AREA URNS AUTO: ABNORMAL /LPF
NITRITE UR QL STRIP.AUTO: NEGATIVE
NRBC BLD-RTO: 0 /100 WBCS (ref 0–0)
PH UR STRIP.AUTO: 6 [PH]
PLATELET # BLD AUTO: 141 X10*3/UL (ref 150–450)
POTASSIUM SERPL-SCNC: 4.7 MMOL/L (ref 3.5–5.3)
PROT UR STRIP.AUTO-MCNC: ABNORMAL MG/DL
RBC # BLD AUTO: 3.81 X10*6/UL (ref 4–5.2)
RBC # UR STRIP.AUTO: NEGATIVE /UL
RBC #/AREA URNS AUTO: ABNORMAL /HPF
SODIUM SERPL-SCNC: 133 MMOL/L (ref 136–145)
SP GR UR STRIP.AUTO: 1.03
SQUAMOUS #/AREA URNS AUTO: ABNORMAL /HPF
TIBC SERPL-MCNC: 257 UG/DL (ref 240–445)
UIBC SERPL-MCNC: 233 UG/DL (ref 110–370)
UROBILINOGEN UR STRIP.AUTO-MCNC: NORMAL MG/DL
WBC # BLD AUTO: 11.8 X10*3/UL (ref 4.4–11.3)
WBC #/AREA URNS AUTO: ABNORMAL /HPF

## 2024-10-16 PROCEDURE — 97530 THERAPEUTIC ACTIVITIES: CPT | Mod: GP

## 2024-10-16 PROCEDURE — 7100000011 HC EXTENDED STAY RECOVERY HOURLY - NURSING UNIT

## 2024-10-16 PROCEDURE — 80048 BASIC METABOLIC PNL TOTAL CA: CPT | Performed by: ORTHOPAEDIC SURGERY

## 2024-10-16 PROCEDURE — 87086 URINE CULTURE/COLONY COUNT: CPT | Mod: WESLAB | Performed by: NURSE PRACTITIONER

## 2024-10-16 PROCEDURE — 97530 THERAPEUTIC ACTIVITIES: CPT | Mod: GO

## 2024-10-16 PROCEDURE — 97110 THERAPEUTIC EXERCISES: CPT | Mod: GP

## 2024-10-16 PROCEDURE — 83540 ASSAY OF IRON: CPT | Performed by: NURSE PRACTITIONER

## 2024-10-16 PROCEDURE — 2500000004 HC RX 250 GENERAL PHARMACY W/ HCPCS (ALT 636 FOR OP/ED): Performed by: NURSE PRACTITIONER

## 2024-10-16 PROCEDURE — 2500000001 HC RX 250 WO HCPCS SELF ADMINISTERED DRUGS (ALT 637 FOR MEDICARE OP): Performed by: ORTHOPAEDIC SURGERY

## 2024-10-16 PROCEDURE — 81001 URINALYSIS AUTO W/SCOPE: CPT | Performed by: NURSE PRACTITIONER

## 2024-10-16 PROCEDURE — 99232 SBSQ HOSP IP/OBS MODERATE 35: CPT | Performed by: NURSE PRACTITIONER

## 2024-10-16 PROCEDURE — 85014 HEMATOCRIT: CPT | Performed by: NURSE PRACTITIONER

## 2024-10-16 PROCEDURE — 97116 GAIT TRAINING THERAPY: CPT | Mod: GP

## 2024-10-16 PROCEDURE — 97535 SELF CARE MNGMENT TRAINING: CPT | Mod: GO

## 2024-10-16 PROCEDURE — 2500000004 HC RX 250 GENERAL PHARMACY W/ HCPCS (ALT 636 FOR OP/ED): Performed by: ORTHOPAEDIC SURGERY

## 2024-10-16 PROCEDURE — 36415 COLL VENOUS BLD VENIPUNCTURE: CPT | Performed by: ORTHOPAEDIC SURGERY

## 2024-10-16 PROCEDURE — 85027 COMPLETE CBC AUTOMATED: CPT | Performed by: ORTHOPAEDIC SURGERY

## 2024-10-16 PROCEDURE — 93005 ELECTROCARDIOGRAM TRACING: CPT

## 2024-10-16 PROCEDURE — 93010 ELECTROCARDIOGRAM REPORT: CPT | Performed by: INTERNAL MEDICINE

## 2024-10-16 PROCEDURE — 36415 COLL VENOUS BLD VENIPUNCTURE: CPT | Performed by: NURSE PRACTITIONER

## 2024-10-16 RX ORDER — CEFTRIAXONE 1 G/50ML
1 INJECTION, SOLUTION INTRAVENOUS EVERY 24 HOURS
Status: DISCONTINUED | OUTPATIENT
Start: 2024-10-16 | End: 2024-10-17 | Stop reason: HOSPADM

## 2024-10-16 RX ORDER — SODIUM CHLORIDE 9 MG/ML
100 INJECTION, SOLUTION INTRAVENOUS CONTINUOUS
Status: DISCONTINUED | OUTPATIENT
Start: 2024-10-16 | End: 2024-10-17

## 2024-10-16 SDOH — ECONOMIC STABILITY: INCOME INSECURITY: IN THE PAST 12 MONTHS HAS THE ELECTRIC, GAS, OIL, OR WATER COMPANY THREATENED TO SHUT OFF SERVICES IN YOUR HOME?: NO

## 2024-10-16 SDOH — ECONOMIC STABILITY: HOUSING INSECURITY: AT ANY TIME IN THE PAST 12 MONTHS, WERE YOU HOMELESS OR LIVING IN A SHELTER (INCLUDING NOW)?: NO

## 2024-10-16 SDOH — ECONOMIC STABILITY: HOUSING INSECURITY: IN THE LAST 12 MONTHS, WAS THERE A TIME WHEN YOU WERE NOT ABLE TO PAY THE MORTGAGE OR RENT ON TIME?: NO

## 2024-10-16 SDOH — HEALTH STABILITY: MENTAL HEALTH: HOW OFTEN DO YOU HAVE A DRINK CONTAINING ALCOHOL?: NEVER

## 2024-10-16 SDOH — SOCIAL STABILITY: SOCIAL INSECURITY: WITHIN THE LAST YEAR, HAVE YOU BEEN AFRAID OF YOUR PARTNER OR EX-PARTNER?: NO

## 2024-10-16 SDOH — ECONOMIC STABILITY: TRANSPORTATION INSECURITY: IN THE PAST 12 MONTHS, HAS LACK OF TRANSPORTATION KEPT YOU FROM MEDICAL APPOINTMENTS OR FROM GETTING MEDICATIONS?: NO

## 2024-10-16 SDOH — SOCIAL STABILITY: SOCIAL INSECURITY: WITHIN THE LAST YEAR, HAVE YOU BEEN HUMILIATED OR EMOTIONALLY ABUSED IN OTHER WAYS BY YOUR PARTNER OR EX-PARTNER?: NO

## 2024-10-16 SDOH — ECONOMIC STABILITY: FOOD INSECURITY: WITHIN THE PAST 12 MONTHS, THE FOOD YOU BOUGHT JUST DIDN'T LAST AND YOU DIDN'T HAVE MONEY TO GET MORE.: NEVER TRUE

## 2024-10-16 SDOH — SOCIAL STABILITY: SOCIAL NETWORK: HOW OFTEN DO YOU ATTEND MEETINGS OF THE CLUBS OR ORGANIZATIONS YOU BELONG TO?: MORE THAN 4 TIMES PER YEAR

## 2024-10-16 SDOH — ECONOMIC STABILITY: FOOD INSECURITY: WITHIN THE PAST 12 MONTHS, YOU WORRIED THAT YOUR FOOD WOULD RUN OUT BEFORE YOU GOT THE MONEY TO BUY MORE.: NEVER TRUE

## 2024-10-16 SDOH — HEALTH STABILITY: PHYSICAL HEALTH: ON AVERAGE, HOW MANY DAYS PER WEEK DO YOU ENGAGE IN MODERATE TO STRENUOUS EXERCISE (LIKE A BRISK WALK)?: 7 DAYS

## 2024-10-16 SDOH — HEALTH STABILITY: PHYSICAL HEALTH: ON AVERAGE, HOW MANY MINUTES DO YOU ENGAGE IN EXERCISE AT THIS LEVEL?: 60 MIN

## 2024-10-16 SDOH — ECONOMIC STABILITY: FOOD INSECURITY: HOW HARD IS IT FOR YOU TO PAY FOR THE VERY BASICS LIKE FOOD, HOUSING, MEDICAL CARE, AND HEATING?: NOT HARD AT ALL

## 2024-10-16 SDOH — HEALTH STABILITY: MENTAL HEALTH: HOW OFTEN DO YOU HAVE SIX OR MORE DRINKS ON ONE OCCASION?: NEVER

## 2024-10-16 SDOH — SOCIAL STABILITY: SOCIAL INSECURITY: ARE YOU MARRIED, WIDOWED, DIVORCED, SEPARATED, NEVER MARRIED, OR LIVING WITH A PARTNER?: MARRIED

## 2024-10-16 SDOH — SOCIAL STABILITY: SOCIAL NETWORK: HOW OFTEN DO YOU GET TOGETHER WITH FRIENDS OR RELATIVES?: MORE THAN THREE TIMES A WEEK

## 2024-10-16 SDOH — HEALTH STABILITY: MENTAL HEALTH: HOW MANY DRINKS CONTAINING ALCOHOL DO YOU HAVE ON A TYPICAL DAY WHEN YOU ARE DRINKING?: PATIENT DOES NOT DRINK

## 2024-10-16 ASSESSMENT — PAIN - FUNCTIONAL ASSESSMENT
PAIN_FUNCTIONAL_ASSESSMENT: 0-10
PAIN_FUNCTIONAL_ASSESSMENT: FLACC (FACE, LEGS, ACTIVITY, CRY, CONSOLABILITY)
PAIN_FUNCTIONAL_ASSESSMENT: 0-10
PAIN_FUNCTIONAL_ASSESSMENT: FLACC (FACE, LEGS, ACTIVITY, CRY, CONSOLABILITY)
PAIN_FUNCTIONAL_ASSESSMENT: 0-10
PAIN_FUNCTIONAL_ASSESSMENT: 0-10

## 2024-10-16 ASSESSMENT — PAIN SCALES - GENERAL
PAINLEVEL_OUTOF10: 0 - NO PAIN
PAINLEVEL_OUTOF10: 1
PAINLEVEL_OUTOF10: 2
PAINLEVEL_OUTOF10: 0 - NO PAIN
PAINLEVEL_OUTOF10: 2
PAINLEVEL_OUTOF10: 1
PAINLEVEL_OUTOF10: 3

## 2024-10-16 ASSESSMENT — PAIN DESCRIPTION - DESCRIPTORS
DESCRIPTORS: DISCOMFORT
DESCRIPTORS: TIGHTNESS

## 2024-10-16 ASSESSMENT — COGNITIVE AND FUNCTIONAL STATUS - GENERAL
MOBILITY SCORE: 17
DAILY ACTIVITIY SCORE: 20
CLIMB 3 TO 5 STEPS WITH RAILING: A LOT
MOBILITY SCORE: 14
MOVING TO AND FROM BED TO CHAIR: A LITTLE
MOBILITY SCORE: 19
MOVING FROM LYING ON BACK TO SITTING ON SIDE OF FLAT BED WITH BEDRAILS: A LOT
STANDING UP FROM CHAIR USING ARMS: A LITTLE
MOVING FROM LYING ON BACK TO SITTING ON SIDE OF FLAT BED WITH BEDRAILS: A LITTLE
MOVING TO AND FROM BED TO CHAIR: A LITTLE
DRESSING REGULAR LOWER BODY CLOTHING: A LOT
HELP NEEDED FOR BATHING: A LITTLE
DRESSING REGULAR LOWER BODY CLOTHING: A LITTLE
TOILETING: A LITTLE
MOVING TO AND FROM BED TO CHAIR: A LITTLE
STANDING UP FROM CHAIR USING ARMS: A LITTLE
TURNING FROM BACK TO SIDE WHILE IN FLAT BAD: A LITTLE
WALKING IN HOSPITAL ROOM: A LITTLE
WALKING IN HOSPITAL ROOM: A LOT
TURNING FROM BACK TO SIDE WHILE IN FLAT BAD: A LOT
DRESSING REGULAR UPPER BODY CLOTHING: A LITTLE
WALKING IN HOSPITAL ROOM: A LITTLE
DAILY ACTIVITIY SCORE: 19
STANDING UP FROM CHAIR USING ARMS: A LITTLE
CLIMB 3 TO 5 STEPS WITH RAILING: A LOT
HELP NEEDED FOR BATHING: A LITTLE
TOILETING: A LITTLE
CLIMB 3 TO 5 STEPS WITH RAILING: A LITTLE
PERSONAL GROOMING: A LITTLE
TURNING FROM BACK TO SIDE WHILE IN FLAT BAD: A LITTLE

## 2024-10-16 ASSESSMENT — PAIN DESCRIPTION - LOCATION: LOCATION: KNEE

## 2024-10-16 ASSESSMENT — ACTIVITIES OF DAILY LIVING (ADL)
HOME_MANAGEMENT_TIME_ENTRY: 13
LACK_OF_TRANSPORTATION: NO
LACK_OF_TRANSPORTATION: NO

## 2024-10-16 ASSESSMENT — LIFESTYLE VARIABLES
AUDIT-C TOTAL SCORE: 0
SKIP TO QUESTIONS 9-10: 1

## 2024-10-16 NOTE — PROGRESS NOTES
Spiritual Care Visit    Clinical Encounter Type  Visited With: Patient  Routine Visit: Introduction  Continue Visiting: No         Values/Beliefs  Spiritual Requests During Hospitalization: Keisha asked for only a blessing. No  sacraments are needed.    Sacramental Encounters  Communion: Does not want communion  Communion Given Indicator: No  Sacrament of Sick-Anointing: Patient declined anointing     Mane Merino

## 2024-10-16 NOTE — NURSING NOTE
Rounding on patient and she is sleeping in bed, no signs of distress, breathing easily and call light in reach

## 2024-10-16 NOTE — NURSING NOTE
Pt consumed about a cup of water to swallow HS pills. About 10-15 minutes later, pt felt nauseated and vomited 75 ml of clear water. No solids or partially dissolved medications were noted.

## 2024-10-16 NOTE — PROGRESS NOTES
"Keisha Baxter is a 84 y.o. female on day 0 of admission presenting with Arthritis of right knee.    Subjective   In bed.  Discussed with nursing she had nausea vomiting throughout the nighttime.  Patient states this is better for her now she is dissipating eating breakfast this morning.  Denies current nausea.  Denies chest pain or shortness of breath.  Wants to try to go home today if she does well with therapy and her nausea has resolved.  States she currently has no knee pain       Objective     Physical Exam  Alert and oriented x 3  Right lower extremity: Dressing dry.  Drain output noted.  Able to straight leg raise with some effort.  Compartments are soft in the thigh and calf.  Active ankle dorsiflexion plantarflexion intact.  Otherwise neurovascularly intact.    Last Recorded Vitals  Blood pressure (!) 124/49, pulse 81, temperature 36.6 °C (97.9 °F), temperature source Oral, resp. rate 18, height 1.651 m (5' 5\"), weight 75.6 kg (166 lb 10.7 oz), SpO2 93%.      Relevant Results  I reviewed postoperative imaging of the right knee: Stable positioning of total knee replacement.    Results for orders placed or performed during the hospital encounter of 10/15/24 (from the past 24 hours)   CBC   Result Value Ref Range    WBC 11.8 (H) 4.4 - 11.3 x10*3/uL    nRBC 0.0 0.0 - 0.0 /100 WBCs    RBC 3.81 (L) 4.00 - 5.20 x10*6/uL    Hemoglobin 11.8 (L) 12.0 - 16.0 g/dL    Hematocrit 35.7 (L) 36.0 - 46.0 %    MCV 94 80 - 100 fL    MCH 31.0 26.0 - 34.0 pg    MCHC 33.1 32.0 - 36.0 g/dL    RDW 13.1 11.5 - 14.5 %    Platelets 141 (L) 150 - 450 x10*3/uL   Basic metabolic panel   Result Value Ref Range    Glucose 128 (H) 74 - 99 mg/dL    Sodium 133 (L) 136 - 145 mmol/L    Potassium 4.7 3.5 - 5.3 mmol/L    Chloride 103 98 - 107 mmol/L    Bicarbonate 26 21 - 32 mmol/L    Anion Gap 9 (L) 10 - 20 mmol/L    Urea Nitrogen 19 6 - 23 mg/dL    Creatinine 0.71 0.50 - 1.05 mg/dL    eGFR 84 >60 mL/min/1.73m*2    Calcium 8.8 8.6 - 10.3 " mg/dL       Assessment/Plan   Principal Problem:    Arthritis of right knee      Problem List:  Right total knee replacement: Postoperative day 1.  Mobilize physical therapy weightbearing as tolerated.  Plan for home with home health care pending PT progress.  DVT risk: Aspirin twice daily  Nausea and vomiting: Appears to be resolved.  Will do an oral p.o. challenge today.  Anticipate discharge today pending progress with PT and her nausea.        Dillon Phillips MD

## 2024-10-16 NOTE — NURSING NOTE
EKG complete reading NSR. Urine sent and labs drawn and sent. NP made aware. Recommended to hold discharge today. Will discuss with Dr. Phillips

## 2024-10-16 NOTE — NURSING NOTE
Took over care of pt at this time. Pt laying in bed, alert, aox4. Pt denies pain, acknowledges a little nausea to the point that she has no desire to eat or drink anything. Pt has no other needs or complaints at this time. R knee hemovac has small amount of red drainage, still compressed, not emptied at this time. Dressing over RLE c/d/intact. Pt oriented to call bell and it and belongings are placed in reach. Bed alarm set. Continuing to monitor.

## 2024-10-16 NOTE — NURSING NOTE
Rounded on pt. Pt laying in bed, eyes closed, respirations even and unlabored. Pt appears to be sleeping and in no apparent pain or distress. RLE dressing and hemovac unchanged since initial assessment. Call bell and belongings are placed in reach. NC O2 in place and connected to flowmeter. Bed alarm set. Continuing to monitor.

## 2024-10-16 NOTE — NURSING NOTE
Discussed poc with Dr. Phillips, states he will keep patient overnight. Pull hemovac tomorrow pending his assessment since patient not currently hemodynamically stable for discharge today.

## 2024-10-16 NOTE — CARE PLAN
Problem: Pain - Adult  Goal: Verbalizes/displays adequate comfort level or baseline comfort level  Outcome: Progressing     Problem: Safety - Adult  Goal: Free from fall injury  Outcome: Progressing     Problem: Discharge Planning  Goal: Discharge to home or other facility with appropriate resources  Outcome: Progressing     Problem: Chronic Conditions and Co-morbidities  Goal: Patient's chronic conditions and co-morbidity symptoms are monitored and maintained or improved  Outcome: Progressing     Problem: Pain  Goal: Takes deep breaths with improved pain control throughout the shift  Outcome: Progressing  Goal: Turns in bed with improved pain control throughout the shift  Outcome: Progressing  Goal: Walks with improved pain control throughout the shift  Outcome: Progressing  Goal: Performs ADL's with improved pain control throughout shift  Outcome: Progressing  Goal: Participates in PT with improved pain control throughout the shift  Outcome: Progressing  Goal: Free from opioid side effects throughout the shift  Outcome: Progressing  Goal: Free from acute confusion related to pain meds throughout the shift  Outcome: Progressing     Problem: Fall/Injury  Goal: Not fall by end of shift  Outcome: Progressing  Goal: Be free from injury by end of the shift  Outcome: Progressing  Goal: Verbalize understanding of personal risk factors for fall in the hospital  Outcome: Progressing  Goal: Verbalize understanding of risk factor reduction measures to prevent injury from fall in the home  Outcome: Progressing  Goal: Use assistive devices by end of the shift  Outcome: Progressing  Goal: Pace activities to prevent fatigue by end of the shift  Outcome: Progressing   The patient's goals for the shift include rest    The clinical goals for the shift include manage nausea and pain    Over the shift, the patient did make progress toward the goals.

## 2024-10-16 NOTE — PROGRESS NOTES
Physical Therapy    Physical Therapy Treatment    Patient Name: Keisha Baxter  MRN: [id number]  Department: [address]  Room: [address] [place] [address]  Today's Date: 10/16/2024  Time Calculation  Start Time: 1505  Stop Time: 1530  Time Calculation (min): 25 min         Assessment/Plan   PT Assessment  Rehab Prognosis: Good  Evaluation/Treatment Tolerance: Patient limited by fatigue  End of Session Communication: Bedside nurse  Assessment Comment: Improving slowly with functional mobility;  tolerance to activity progressing slowly;  fall risk  End of Session Patient Position: Up in chair, Alarm off, not on at start of session  PT Plan  Inpatient/Swing Bed or Outpatient: Inpatient  PT Plan  Treatment/Interventions: Bed mobility, Transfer training, Gait training, Balance training, Strengthening, Endurance training, Range of motion, Therapeutic exercise, Therapeutic activity, Home exercise program  PT Plan: Ongoing PT  PT Frequency: BID  PT Discharge Recommendations: Low intensity level of continued care  PT Recommended Transfer Status: Assist x1  PT - OK to Discharge: Yes (with skilled physical therapy services at next level of care)      General Visit Information:   PT  Visit  PT Received On: 10/16/24  General  Prior to Session Communication: Bedside nurse  Patient Position Received: Bed, 3 rail up, Alarm off, not on at start of session  General Comment: RN cleared patient for treatment.  Patient reports agreeable to treatment.    Subjective   Precautions:  Precautions  LE Weight Bearing Status: Weight Bearing as Tolerated  Medical Precautions: Fall precautions  Post-Surgical Precautions: Right total knee precautions    Vital Signs (Past 2hrs)        Date/Time Vitals Session Patient Position Pulse Resp SpO2 BP MAP (mmHg)    10/16/24 1520 --  --  80  16  95 %  143/54  76                         Objective   Pain:  Pain Assessment  Pain Assessment: 0-10  0-10 (Numeric) Pain Score: 1  Pain Type: Surgical pain  Pain Location: Knee  Pain  Orientation: Right  Cognition:  Cognition  Overall Cognitive Status: Within Functional Limits  Coordination:  Movements are Fluid and Coordinated: No  Lower Body Coordination: slower rate of movement marcia LE (right greater than left)  Postural Control:  Static Sitting Balance  Static Sitting-Balance Support: Bilateral upper extremity supported  Static Sitting-Level of Assistance: Close supervision  Static Sitting-Comment/Number of Minutes: Supervision with balance while sitting on side of bed  Static Standing Balance  Static Standing-Balance Support: Bilateral upper extremity supported  Static Standing-Level of Assistance: Close supervision  Static Standing-Comment/Number of Minutes: Supervision with static standing with rolling walker          Activity Tolerance:  Activity Tolerance  Endurance: Decreased tolerance for upright activites  Activity Tolerance Comments: Fatigue  Treatments:  Therapeutic Exercise  Therapeutic Exercise Performed: Yes  Therapeutic Exercise Activity 1: Ankle pumps, quad sets 5-10 reps x 1 set    Therapeutic Activity  Therapeutic Activity Performed: Yes  Therapeutic Activity 1: Static standing with rolling walker x ~2 minutes with supervision with balance         Bed Mobility  Bed Mobility: Yes  Bed Mobility 1  Bed Mobility 1: Supine to sitting  Level of Assistance 1: Close supervision  Bed Mobility Comments 1: Increased time and effort required to achieve supine to sit    Ambulation/Gait Training  Ambulation/Gait Training Performed: Yes  Ambulation/Gait Training 1  Surface 1: Level tile  Device 1: Rolling walker  Assistance 1: Close supervision  Comments/Distance (ft) 1: 15 feet x 2, 25 feet x 1 with rolling walker and supervision with balance;  verbal cues for gait sequencing;  patient ambulates with slow esau, non-reciprocating gait pattern, decreased step length marcia LE (right greater than left), and decreased stance phase right LE relative to left LE.  Transfers  Transfer:  Yes  Transfer 1  Transfer From 1: Sit to  Transfer to 1: Stand  Technique 1: Sit to stand  Transfer Device 1: Walker  Transfer Level of Assistance 1: Close supervision  Trials/Comments 1: x 2 reps;  supervision with balance;  verbal cues for hand placement and right LE position  Transfers 2  Transfer From 2: Stand to  Transfer to 2: Sit  Technique 2: Stand to sit  Transfer Device 2: Walker  Transfer Level of Assistance 2: Close supervision  Trials/Comments 2: x 2 reps;  supervision with balance;  verbal cues for hand placement and right LE position  Transfers 3  Transfer From 3: Chair with arms to  Transfer to 3: Bed  Technique 3: Stand pivot  Transfer Device 3: Walker  Transfer Level of Assistance 3: Minimum assistance  Trials/Comments 3: Assist with balance during stand pivot transfer;  patient had difficulty with supporting body weight with right LE during transfer;  increased time and effort required to weightshift and advance marcia LE during pivot portion of transfer.    Stairs  Stairs: No         Outcome Measures:  [facility] Basic Mobility  Turning from your back to your side while in a flat bed without using bedrails: A little  Moving from lying on your back to sitting on the side of a flat bed without using bedrails: A little  Moving to and from bed to chair (including a wheelchair): A little  Standing up from a chair using your arms (e.g. wheelchair or bedside chair): A little  To walk in hospital room: A little  Climbing 3-5 steps with railing: A lot  Basic Mobility - Total Score: 17    Education Documentation  No documentation found.  Education Comments  No comments found.        OP EDUCATION:       Encounter Problems       Encounter Problems (Active)       Mobility       Bed mobility including supine to sit and sit to supine with supervision. (Progressing)       Start:  10/15/24    Expected End:  10/29/24            Transfers including sit to stand and stand to sit with supervision. (Progressing)       Start:   10/15/24    Expected End:  10/29/24            Ambulate 60 feet with rolling walker and supervision. (Progressing)       Start:  10/15/24    Expected End:  10/29/24               Pain - Adult

## 2024-10-16 NOTE — PROGRESS NOTES
"Occupational Therapy    OT Treatment    Patient Name: Keisha Baxter  MRN: [id number]  Department: [address]  Room: [address] [place] [address]  Today's Date: 10/16/2024  Time Calculation  Start Time: 0842  Stop Time: 0910  Time Calculation (min): 28 min        Assessment:  OT Assessment: Pt demonstrating some functional progress towards her goals, but somewhat limited by dizziness and \"feeling weak\" this morning. Continue POC.  Prognosis: Good  Evaluation/Treatment Tolerance: Treatment limited secondary to medical complications (Comment) (Dizziness)  End of Session Communication: Bedside nurse  End of Session Patient Position: Up in chair, Alarm off, caregiver present (Needs in reach and nurse in room.)    Plan:  Treatment Interventions: ADL retraining, Functional transfer training, UE strengthening/ROM, Endurance training, Equipment evaluation/education, Compensatory technique education  OT Frequency: 5 times per week  OT Discharge Recommendations: Low intensity level of continued care, Other (Comment) (Recommend assist from spouse as needed.)  Equipment Recommended upon Discharge: Wheeled walker  OT Recommended Transfer Status: Minimal assist  OT - OK to Discharge: Yes      Subjective     Previous Visit Info:  OT Last Visit  OT Received On: 10/16/24    General:  General  Family/Caregiver Present: No  Prior to Session Communication: Bedside nurse  Patient Position Received: Bed, 3 rail up, Alarm off, not on at start of session  General Comment: Cleared by nurse prior to session and pt agreeable to OT treatment.    Precautions:  LE Weight Bearing Status: Weight Bearing as Tolerated (RLE)  Medical Precautions: Fall precautions  Post-Surgical Precautions: Right total knee precautions (Handout provided.)    Vital Signs (Past 2hrs)        Date/Time Vitals Session Patient Position Pulse Resp SpO2 BP MAP (mmHg)    10/16/24 0842 During OT  --  100  --  97 %  --  --     10/16/24 0900 --  --  --  --  --  91/45  54                 Pain:  Pain " Assessment  Pain Assessment: 0-10  0-10 (Numeric) Pain Score: 2  Pain Type: Surgical pain  Pain Location: Knee  Pain Orientation: Right  Pain Interventions: Ambulation/increased activity    Objective      Cognition:  Cognition  Overall Cognitive Status: Within Functional Limits  Orientation Level: Oriented X4  Insight: Mild    Activities of Daily Living: Grooming  Grooming Level of Assistance: Contact guard  Grooming Where Assessed: Standing sinkside  Grooming Comments: Pt washed her hands while standing at the sink with steadying assist for balance/safety.    Toileting  Toileting Level of Assistance: Minimum assistance  Where Assessed: Toilet  Toileting Comments: Pt required min A for brief/clothing management and pericare in standing d/t impaired balance.    Bed Mobility/Transfers: Bed Mobility  Bed Mobility: Yes  Bed Mobility 1  Bed Mobility 1: Supine to sitting  Level of Assistance 1: Close supervision, Minimal verbal cues  Bed Mobility Comments 1: Supervision for safety and increased time to complete.    Transfers  Transfer: Yes  Transfer 1  Transfer From 1: Bed to  Transfer to 1: Stand  Technique 1: Sit to stand  Transfer Device 1: Walker  Transfer Level of Assistance 1: Minimum assistance  Trials/Comments 1: Steadying assist for balance/safety and verbal cues for hand placement/sequencing.  Transfers 2  Transfer From 2: Stand to  Transfer to 2: Chair with arms  Technique 2: Stand to sit  Transfer Device 2: Walker  Transfer Level of Assistance 2: Minimum assistance, Minimal verbal cues  Trials/Comments 2: Steadying assist for balance/safety and verbal cues for hand placement/sequencing.    Toilet Transfers  Toilet Transfer From: Walker  Toilet Transfer Type: To and from  Toilet Transfer to: Standard toilet  Toilet Transfer Technique: Ambulating  Toilet Transfers: Minimal assistance, Verbal cues  Toilet Transfers Comments: Steadying assist for balance/safety and verbal cues for hand  placement/sequencing.    Functional Mobility:  Functional Mobility  Functional Mobility Performed: Yes  Functional Mobility 1  Device 1: Rolling walker  Assistance 1: Minimum assistance, Minimal verbal cues  Comments 1: Pt completed short functional mobility to/from the bathroom using a RW with min A for balance/safety and verbal cues as needed. Pt became dizzy coming out of the bathroom with slower mobilty as a result. See vitals for details.    Therapy/Activity: Therapeutic Activity  Therapeutic Activity Performed:  (Educated pt on removal of throw rugs at home for safety with fall prevention.)      Outcome Measures:[facility] Daily Activity  Putting on and taking off regular lower body clothing: A lot  Bathing (including washing, rinsing, drying): A little  Putting on and taking off regular upper body clothing: None  Toileting, which includes using toilet, bedpan or urinal: A little  Taking care of personal grooming such as brushing teeth: A little  Eating Meals: None  Daily Activity - Total Score: 19      Education Documentation  No documentation found.  Education Comments  No comments found.      Goals:  Encounter Problems       Encounter Problems (Active)       ADLs       Patient with complete lower body dressing with modified independent level of assistance donning and doffing all LE clothes  with PRN adaptive equipment while edge of bed  (Progressing)       Start:  10/15/24    Expected End:  11/15/24            Patient will complete daily grooming tasks with independent level of assistance and PRN adaptive equipment while standing. (Progressing)       Start:  10/15/24    Expected End:  11/15/24            Patient will complete toileting including hygiene clothing management/hygiene with modified independent level of assistance and raised toilet seat and grab bars. (Progressing)       Start:  10/15/24    Expected End:  11/15/24               COGNITION/SAFETY       Patient will recall and adhere to knee precautions  during all functional mobility/ADL tasks in order to demonstrate improved understanding and promote healing post op (Progressing)       Start:  10/15/24    Expected End:  11/15/24               MOBILITY       Patient will perform Functional mobility max Household distances/Community Distances with modified independent level of assistance and least restrictive device in order to improve safety and functional mobility. (Progressing)       Start:  10/15/24    Expected End:  11/15/24

## 2024-10-16 NOTE — CARE PLAN
Problem: Mobility  Goal: Bed mobility including supine to sit and sit to supine with supervision.  10/16/2024 1715 by Clint Gray, PT  Outcome: Progressing  10/16/2024 1018 by Clint Gray, PT  Outcome: Progressing  Goal: Transfers including sit to stand and stand to sit with supervision.  10/16/2024 1715 by Clint rGay, PT  Outcome: Progressing  10/16/2024 1018 by Clint Gray, PT  Outcome: Progressing  Goal: Ambulate 60 feet with rolling walker and supervision.  Outcome: Progressing

## 2024-10-16 NOTE — PROGRESS NOTES
Keisha Baxter is a 84 y.o. female on day 0 of admission presenting with Arthritis of right knee.      Subjective   Patient seen and examined.  Awake/alert/oriented.  Had some nausea and vomiting last night.  States this feels improved this morning however has not eaten anything yet this morning.  Denies chest pain, shortness of breath, no abdominal discomfort.  Pain is controlled.  Notified by RN after assessment, patient having hypotension and feeling lightheaded.  Also reported dark urine.       Objective     Last Recorded Vitals  BP (!) 96/40   Pulse 73   Temp 36.6 °C (97.9 °F) (Oral)   Resp 18   Wt 75.6 kg (166 lb 10.7 oz)   SpO2 97%   Intake/Output last 3 Shifts:    Intake/Output Summary (Last 24 hours) at 10/16/2024 1011  Last data filed at 10/16/2024 0500  Gross per 24 hour   Intake 1220 ml   Output 576 ml   Net 644 ml       Admission Weight  Weight: 74.4 kg (164 lb) (10/15/24 0601)    Daily Weight  10/16/24 : 75.6 kg (166 lb 10.7 oz)    Image Results  XR knee right 1-2 views  Narrative: Interpreted By:  Juan Manuel Youngblood,   STUDY:  XR KNEE RIGHT 1-2 VIEWS; ;  10/15/2024 10:44 am      INDICATION:  Signs/Symptoms:Post op knee.          COMPARISON:  None.      ACCESSION NUMBER(S):  [id number]      ORDERING CLINICIAN:  DANETTE HAQUE      FINDINGS:  Right knee, two views      Interval total knee arthroplasty in place. Surgical drain present.  Large effusion. Prepatellar soft tissue edema right      Impression: No hardware failure about the right total knee arthroplasty      MACRO:  None      Signed by: Juan Maunel Youngblood 10/15/2024 10:55 AM  Dictation workstation:   [id number]      Physical Exam  General: Well developed. NAD.  HEENT:  EOMI. Pink conjunctiva.   Cardiovascular: S1, S2. RRR.   Respiratory: Breath sounds clear bilaterally. No cyanosis. No hypoxia.  GI: Abdomen soft, nontender. Bowel sounds present in all quadrants .   MS: Right surgical knee  Neuro: Alert, oriented.   Skin: Warm, dry.   Surgical dressing intact.    Relevant Results  Lab Results   Component Value Date    GLUCOSE 128 (H) 10/16/2024    CALCIUM 8.8 10/16/2024     (L) 10/16/2024    K 4.7 10/16/2024    CO2 26 10/16/2024     10/16/2024    BUN 19 10/16/2024    CREATININE 0.71 10/16/2024      Lab Results   Component Value Date    WBC 11.8 (H) 10/16/2024    HGB 11.8 (L) 10/16/2024    HCT 35.7 (L) 10/16/2024    MCV 94 10/16/2024     (L) 10/16/2024    XR knee right 1-2 views    Result Date: 10/15/2024  Interpreted By:  Juan Manuel Youngblood, STUDY: XR KNEE RIGHT 1-2 VIEWS; ;  10/15/2024 10:44 am   INDICATION: Signs/Symptoms:Post op knee.     COMPARISON: None.   ACCESSION NUMBER(S): [id number]   ORDERING CLINICIAN: DANETTE PHILLIPS   FINDINGS: Right knee, two views   Interval total knee arthroplasty in place. Surgical drain present. Large effusion. Prepatellar soft tissue edema right       No hardware failure about the right total knee arthroplasty   MACRO: None   Signed by: Juan Manuel Youngblood 10/15/2024 10:55 AM Dictation workstation:   [id number]           Assessment/Plan      Assessment & Plan  Arthritis of right knee  Hypotension  Lightheadedness  Notified by RN, patient with a systolic in the 90s.  Patient complaining of lightheadedness.  Also noted dark urine.  Will check orthostatic blood pressures  Stat H&H  UA  EKG  Hold discharge for now    Recent TIA  Continue statin, Plavix resumed per Ortho  Started on aspirin twice daily per orthopedic surgery for DVT prophylaxis  Follow-up with neurology outpatient     Right knee osteoarthritis  Status post right total knee arthroplasty subvastus approach by Dr. Phillips on 10/15/2024  Pain management  Incentive spirometer  PT/OT/out of bed  DVT prophylaxis  Orthopedic surgery managing     Hypertension-patient hypotensive this morning  Recently diagnosed  Started on lisinopril during last hospitalization  Monitor blood pressure  Will hold lisinopril for now     Mixed hyperlipidemia  Resume  home statin     Nausea and vomiting  As needed Zofran/Compazine     Hypothyroidism  Resume home levothyroxine     Plan  Pain management/bowel regimen  Encourage incentive spirometer  PT/OT  DVT prophylaxis  Discharge per orthopedic surgery  Will check stat EKG, orthostatic vitals, UA with reflex to culture, stat H&H  Continue IV fluids  Would recommend holding discharge for now                Carly Sanders, APRN-CNP

## 2024-10-16 NOTE — ASSESSMENT & PLAN NOTE
Hypotension  Lightheadedness  Notified by RN, patient with a systolic in the 90s.  Patient complaining of lightheadedness.  Also noted dark urine.  Will check orthostatic blood pressures  Stat H&H  UA  EKG  Hold discharge for now    Recent TIA  Continue statin, Plavix resumed per Ortho  Started on aspirin twice daily per orthopedic surgery for DVT prophylaxis  Follow-up with neurology outpatient     Right knee osteoarthritis  Status post right total knee arthroplasty subvastus approach by Dr. Phillips on 10/15/2024  Pain management  Incentive spirometer  PT/OT/out of bed  DVT prophylaxis  Orthopedic surgery managing     Hypertension-patient hypotensive this morning  Recently diagnosed  Started on lisinopril during last hospitalization  Monitor blood pressure  Will hold lisinopril for now     Mixed hyperlipidemia  Resume home statin     Nausea and vomiting  As needed Zofran/Compazine     Hypothyroidism  Resume home levothyroxine     Plan  Pain management/bowel regimen  Encourage incentive spirometer  PT/OT  DVT prophylaxis  Discharge per orthopedic surgery  Will check stat EKG, orthostatic vitals, UA with reflex to culture, stat H&H  Continue IV fluids  Would recommend holding discharge for now

## 2024-10-16 NOTE — CARE PLAN
Problem: Mobility  Goal: Bed mobility including supine to sit and sit to supine with supervision.  Outcome: Progressing  Goal: Transfers including sit to stand and stand to sit with supervision.  Outcome: Progressing

## 2024-10-16 NOTE — NURSING NOTE
Obtained bedside shift report from nightshift RN with patient sleeping in bed on arrival but woke up during report. Patient stated she was able to get some sleep overnight and pain and nausea are currently better but she still does not quiet feel like herself. Patient did have some urinary retention overnight but was able to void 275 per nightshift RN. Patient denied any other questions or concerns at this time, call light in reach.

## 2024-10-16 NOTE — NURSING NOTE
"Patient feeling \"drained in head\" and lightheaded. Hospitalist team made aware. Orthostatic positive and NP made aware. Labs to be drawn and EKG being completed.   "

## 2024-10-16 NOTE — NURSING NOTE
Pt bladder scanned about 0445 and estimated 542 ml. Pt told me at that time that when she was in the ED recently, they had to bladder scan her for the same reason. She told me that when they sat her up, she was able to void. With the help of KAREL Kaba RN, pt moved to edge of bed, stood with walker, a bedpan was placed underneath her, and she sat back down. Pt was able to void 275 ml. Pt stood again, the bedpan removed, and linens straightened. Pt denied pain once being back in bed. Pt stated she had been drinking a little all night which was evidenced by her water mug being down 500 ml. Besides not voiding while in bed and being nauseated and vomiting after taking her HS pills, no changes since prior assessment. RLE dressing c/d/intact, hemovac emptied of 100 ml red drainage. Call bell and belongings are placed in reach. Bed alarm set. Continuing to monitor.

## 2024-10-16 NOTE — NURSING NOTE
Secure chat with remote hospitalist, Lori Kaufman, APRN-CNP:    0025 Me: Dr. Phillips did a right total knee on this pt. She has struggled with nausea since coming to floor from PACU. Near start of my night shift, IVF were ordered since she wasn't tolerating PO. PCA bladder scanned pt, because there was no output in pure wick cannister. She found bladder estimate of 302 ml, and pt   oops, and pt's brief was dry. How would you like me to proceed?     0026 Shae: Sounds like she may have been dry. Repeat the bladder scan in 4hours if she still hasn't voided.     0026 Me: Ok! Thank you!

## 2024-10-16 NOTE — PROGRESS NOTES
Physical Therapy    Physical Therapy Treatment    Patient Name: Keisha Baxter  MRN: [id number]  Department: [address]  Room: [facility]449-  Today's Date: 10/16/2024  Time Calculation  Start Time: 0943  Stop Time: 1007  Time Calculation (min): 24 min         Assessment/Plan   PT Assessment  PT Assessment Results: Decreased strength, Decreased range of motion, Decreased endurance, Impaired balance, Decreased mobility, Decreased safety awareness  Rehab Prognosis: Good  Evaluation/Treatment Tolerance: Patient limited by fatigue (Low blood pressure)  End of Session Communication: Bedside nurse  Assessment Comment: Limited tolerance to activity this treatment;  low blood pressure; fall risk  End of Session Patient Position: Bed, 3 rail up, Alarm on  PT Plan  Inpatient/Swing Bed or Outpatient: Inpatient  PT Plan  Treatment/Interventions: Bed mobility, Transfer training, Gait training, Balance training, Strengthening, Endurance training, Range of motion, Therapeutic exercise, Therapeutic activity, Home exercise program  PT Plan: Ongoing PT  PT Frequency: BID  PT Discharge Recommendations: Low intensity level of continued care  PT Recommended Transfer Status: Independent  PT - OK to Discharge: Yes      General Visit Information:   PT  Visit  PT Received On: 10/16/24  General  Reason for Referral: Impaired mobility with arthritis right knee;  s/p rigth TKA 10/15/2024  Past Medical History Relevant to Rehab: Hypothyroid, arthritis, hyperlipid, marcia THR  Co-Treatment: OT  Co-Treatment Reason: Limited tolerance to activity POD #0  Prior to Session Communication: Bedside nurse  Patient Position Received: Up in chair, Alarm off, not on at start of session  General Comment: RN cleared patient for treatment.  Patient reports feeling very fatigue; requesting to return to bed.    Subjective   Precautions:  Precautions  LE Weight Bearing Status: Weight Bearing as Tolerated  Medical Precautions: Fall precautions  Post-Surgical Precautions:  Right total knee precautions    Vital Signs (Past 2hrs)        Date/Time Vitals Session Patient Position Pulse Resp SpO2 BP MAP (mmHg)    10/16/24 0842 During OT  --  100  --  97 %  --  --     10/16/24 0900 --  --  --  --  --  91/45  54     10/16/24 0943 --  Sitting  73  --  --  96/40  53     10/16/24 1005 --  --  73  --  --  96/40  53     10/16/24 1015 --  --  70  --  --  113/42  60                         Objective   Pain:  Pain Assessment  Pain Assessment: 0-10  0-10 (Numeric) Pain Score: 0 - No pain  Pain Type: Surgical pain  Pain Location: Knee  Pain Orientation: Right  Cognition:  Cognition  Overall Cognitive Status: Within Functional Limits (appears fatigue)  Coordination:  Movements are Fluid and Coordinated: No  Lower Body Coordination: slower rate of  movement marcia LE (right greater than left)  Postural Control:  Static Sitting Balance  Static Sitting-Balance Support: Bilateral upper extremity supported  Static Sitting-Level of Assistance: Close supervision  Static Sitting-Comment/Number of Minutes: Supervision with balance while sitting on side of bed  Static Standing Balance  Static Standing-Balance Support: Bilateral upper extremity supported  Static Standing-Level of Assistance: Minimum assistance  Static Standing-Comment/Number of Minutes: Assist with trunk support and balance during static standing with rolling walker  Extremity/Trunk Assessments:  RLE   RLE : Exceptions to WFL  AROM RLE (degrees)  R Knee Flexion 0-130: 20  Strength RLE  R Hip Flexion: 2/5  R Knee Extension: 2/5  R Ankle Dorsiflexion: 3/5     Activity Tolerance:  Activity Tolerance  Endurance: Decreased tolerance for upright activites  Activity Tolerance Comments: Fatigue;  low blood pressure  Treatments:  Therapeutic Exercise  Therapeutic Exercise Performed: Yes  Therapeutic Exercise Activity 1: Ankle pumps, quad set, heel slides, SLR, TKE 5 reps x 1 set right LE with assist;  written HEP issued              Bed Mobility  Bed  Mobility: Yes  Bed Mobility 1  Bed Mobility 1: Sitting to supine  Level of Assistance 1: Moderate assistance  Bed Mobility Comments 1: Assist with lifting marcia LE into bed during sit to supine  Bed Mobility 2  Bed Mobility  2: Sitting to supine  Level of Assistance 2: Moderate assistance  Bed Mobility Comments 2: Assist with right LE during sit to supine    Ambulation/Gait Training  Ambulation/Gait Training Performed: No  Transfers  Transfer: Yes  Transfer 1  Transfer From 1: Sit to  Transfer to 1: Stand  Technique 1: Sit to stand  Transfer Device 1: Walker  Transfer Level of Assistance 1: Minimum assistance  Trials/Comments 1: Assist with trunk support and balance;  verbal cues for hand placement and right LE position  Transfers 2  Transfer From 2: Stand to  Transfer to 2: Sit  Technique 2: Stand to sit  Transfer Device 2: Walker  Transfer Level of Assistance 2: Minimum assistance  Trials/Comments 2: Assist with trunk support and balance;  verbal cues for hand placement and right LE position  Transfers 3  Transfer From 3: Chair with arms to  Transfer to 3: Bed  Technique 3: Stand pivot  Transfer Device 3: Walker  Transfer Level of Assistance 3: Minimum assistance  Trials/Comments 3: Assist with balance during stand pivot transfer;  patient had difficulty with supporting body weight with right LE during transfer;  increased time and effort required to weightshift and advance marcia LE during pivot portion of transfer.    Stairs  Stairs: No         Outcome Measures:  [facility] Basic Mobility  Turning from your back to your side while in a flat bed without using bedrails: A lot  Moving from lying on your back to sitting on the side of a flat bed without using bedrails: A lot  Moving to and from bed to chair (including a wheelchair): A little  Standing up from a chair using your arms (e.g. wheelchair or bedside chair): A little  To walk in hospital room: A lot  Climbing 3-5 steps with railing: A lot  Basic Mobility - Total  Score: 14    Education Documentation  No documentation found.  Education Comments  No comments found.        OP EDUCATION:       Encounter Problems       Encounter Problems (Active)       Mobility       Bed mobility including supine to sit and sit to supine with supervision. (Progressing)       Start:  10/15/24    Expected End:  10/29/24            Transfers including sit to stand and stand to sit with supervision. (Progressing)       Start:  10/15/24    Expected End:  10/29/24            Ambulate 60 feet with rolling walker and supervision.       Start:  10/15/24    Expected End:  10/29/24               Pain - Adult

## 2024-10-16 NOTE — PROGRESS NOTES
[facility] pt is agreeable for [facility] at [place], gave ok for [facility].      10/16/24 1219   Discharge Planning   Living Arrangements Spouse/significant other   Support Systems Spouse/significant other;Children;Family members;Friends/neighbors   Type of Residence Private residence   Number of Stairs to Enter Residence 1   Number of Stairs Within Residence 0   Do you have animals or pets at home? No   Who is requesting discharge planning? Provider   Home or Post Acute Services In home services   Type of Home Care Services Home PT   Expected Discharge Disposition Home H  ([facility])   Does the patient need discharge transport arranged? No   Financial Resource Strain   How hard is it for you to pay for the very basics like food, housing, medical care, and heating? Not hard   Housing Stability   In the last 12 months, was there a time when you were not able to pay the mortgage or rent on time? N   At any time in the past 12 months, were you homeless or living in a shelter (including now)? N   Transportation Needs   In the past 12 months, has lack of transportation kept you from medical appointments or from getting medications? no   In the past 12 months, has lack of transportation kept you from meetings, work, or from getting things needed for daily living? No

## 2024-10-17 ENCOUNTER — DOCUMENTATION (OUTPATIENT)
Dept: HOME HEALTH SERVICES | Facility: HOME HEALTH | Age: 84
End: 2024-10-17
Payer: COMMERCIAL

## 2024-10-17 VITALS
BODY MASS INDEX: 28.65 KG/M2 | OXYGEN SATURATION: 98 % | WEIGHT: 171.96 LBS | HEIGHT: 65 IN | TEMPERATURE: 96.4 F | SYSTOLIC BLOOD PRESSURE: 161 MMHG | HEART RATE: 82 BPM | DIASTOLIC BLOOD PRESSURE: 61 MMHG | RESPIRATION RATE: 20 BRPM

## 2024-10-17 LAB
ANION GAP SERPL CALCULATED.3IONS-SCNC: 9 MMOL/L (ref 10–20)
ATRIAL RATE: 71 BPM
BACTERIA UR CULT: NO GROWTH
BUN SERPL-MCNC: 12 MG/DL (ref 6–23)
CALCIUM SERPL-MCNC: 7.9 MG/DL (ref 8.6–10.3)
CHLORIDE SERPL-SCNC: 106 MMOL/L (ref 98–107)
CO2 SERPL-SCNC: 24 MMOL/L (ref 21–32)
CREAT SERPL-MCNC: 0.43 MG/DL (ref 0.5–1.05)
EGFRCR SERPLBLD CKD-EPI 2021: >90 ML/MIN/1.73M*2
ERYTHROCYTE [DISTWIDTH] IN BLOOD BY AUTOMATED COUNT: 13.2 % (ref 11.5–14.5)
GLUCOSE SERPL-MCNC: 102 MG/DL (ref 74–99)
HCT VFR BLD AUTO: 30.6 % (ref 36–46)
HCT VFR BLD AUTO: 30.8 % (ref 36–46)
HGB BLD-MCNC: 10.1 G/DL (ref 12–16)
HGB BLD-MCNC: 9.9 G/DL (ref 12–16)
MCH RBC QN AUTO: 30.8 PG (ref 26–34)
MCHC RBC AUTO-ENTMCNC: 32.4 G/DL (ref 32–36)
MCV RBC AUTO: 95 FL (ref 80–100)
NRBC BLD-RTO: 0 /100 WBCS (ref 0–0)
P AXIS: 61 DEGREES
P OFFSET: 183 MS
P ONSET: 122 MS
PLATELET # BLD AUTO: 113 X10*3/UL (ref 150–450)
POTASSIUM SERPL-SCNC: 3.8 MMOL/L (ref 3.5–5.3)
PR INTERVAL: 178 MS
Q ONSET: 211 MS
QRS COUNT: 12 BEATS
QRS DURATION: 110 MS
QT INTERVAL: 428 MS
QTC CALCULATION(BAZETT): 465 MS
QTC FREDERICIA: 452 MS
R AXIS: -29 DEGREES
RBC # BLD AUTO: 3.21 X10*6/UL (ref 4–5.2)
SODIUM SERPL-SCNC: 135 MMOL/L (ref 136–145)
T AXIS: 94 DEGREES
T OFFSET: 425 MS
VENTRICULAR RATE: 71 BPM
WBC # BLD AUTO: 7.7 X10*3/UL (ref 4.4–11.3)

## 2024-10-17 PROCEDURE — 97116 GAIT TRAINING THERAPY: CPT | Mod: GP

## 2024-10-17 PROCEDURE — 36415 COLL VENOUS BLD VENIPUNCTURE: CPT | Performed by: NURSE PRACTITIONER

## 2024-10-17 PROCEDURE — 97535 SELF CARE MNGMENT TRAINING: CPT | Mod: GO

## 2024-10-17 PROCEDURE — 2500000004 HC RX 250 GENERAL PHARMACY W/ HCPCS (ALT 636 FOR OP/ED): Performed by: NURSE PRACTITIONER

## 2024-10-17 PROCEDURE — 85014 HEMATOCRIT: CPT | Performed by: NURSE PRACTITIONER

## 2024-10-17 PROCEDURE — 85027 COMPLETE CBC AUTOMATED: CPT | Performed by: NURSE PRACTITIONER

## 2024-10-17 PROCEDURE — 99232 SBSQ HOSP IP/OBS MODERATE 35: CPT | Performed by: NURSE PRACTITIONER

## 2024-10-17 PROCEDURE — 97530 THERAPEUTIC ACTIVITIES: CPT | Mod: GO

## 2024-10-17 PROCEDURE — 2500000001 HC RX 250 WO HCPCS SELF ADMINISTERED DRUGS (ALT 637 FOR MEDICARE OP): Performed by: ORTHOPAEDIC SURGERY

## 2024-10-17 PROCEDURE — 80048 BASIC METABOLIC PNL TOTAL CA: CPT | Performed by: NURSE PRACTITIONER

## 2024-10-17 PROCEDURE — 7100000011 HC EXTENDED STAY RECOVERY HOURLY - NURSING UNIT

## 2024-10-17 PROCEDURE — 2500000004 HC RX 250 GENERAL PHARMACY W/ HCPCS (ALT 636 FOR OP/ED): Performed by: ORTHOPAEDIC SURGERY

## 2024-10-17 PROCEDURE — 97110 THERAPEUTIC EXERCISES: CPT | Mod: GP

## 2024-10-17 RX ORDER — OXYCODONE HYDROCHLORIDE 5 MG/1
5 TABLET ORAL EVERY 4 HOURS PRN
Qty: 40 TABLET | Refills: 0 | Status: SHIPPED | OUTPATIENT
Start: 2024-10-17 | End: 2024-10-24

## 2024-10-17 RX ORDER — ACETAMINOPHEN 500 MG
1000 TABLET ORAL EVERY 6 HOURS PRN
Qty: 120 TABLET | Refills: 0 | Status: SHIPPED | OUTPATIENT
Start: 2024-10-17 | End: 2024-11-01

## 2024-10-17 RX ORDER — DOCUSATE SODIUM 100 MG/1
100 CAPSULE, LIQUID FILLED ORAL 2 TIMES DAILY
Qty: 30 CAPSULE | Refills: 0 | Status: SHIPPED | OUTPATIENT
Start: 2024-10-17 | End: 2024-11-01

## 2024-10-17 RX ORDER — IBUPROFEN 600 MG/1
600 TABLET ORAL EVERY 6 HOURS PRN
Qty: 20 TABLET | Refills: 0 | Status: SHIPPED | OUTPATIENT
Start: 2024-10-17 | End: 2024-10-22

## 2024-10-17 RX ORDER — CEFUROXIME AXETIL 500 MG/1
500 TABLET ORAL 2 TIMES DAILY
Qty: 12 TABLET | Refills: 0 | Status: SHIPPED | OUTPATIENT
Start: 2024-10-17 | End: 2024-10-23

## 2024-10-17 RX ORDER — ASPIRIN 81 MG/1
81 TABLET ORAL 2 TIMES DAILY
Qty: 56 TABLET | Refills: 0 | Status: SHIPPED | OUTPATIENT
Start: 2024-10-17 | End: 2024-11-14

## 2024-10-17 ASSESSMENT — PAIN SCALES - GENERAL
PAINLEVEL_OUTOF10: 0 - NO PAIN
PAINLEVEL_OUTOF10: 1
PAINLEVEL_OUTOF10: 9
PAINLEVEL_OUTOF10: 8
PAINLEVEL_OUTOF10: 0 - NO PAIN

## 2024-10-17 ASSESSMENT — COGNITIVE AND FUNCTIONAL STATUS - GENERAL
MOBILITY SCORE: 20
WALKING IN HOSPITAL ROOM: A LITTLE
DAILY ACTIVITIY SCORE: 20
STANDING UP FROM CHAIR USING ARMS: A LITTLE
HELP NEEDED FOR BATHING: A LITTLE
CLIMB 3 TO 5 STEPS WITH RAILING: A LITTLE
WALKING IN HOSPITAL ROOM: A LITTLE
PERSONAL GROOMING: A LITTLE
MOBILITY SCORE: 19
TOILETING: A LITTLE
DRESSING REGULAR UPPER BODY CLOTHING: A LITTLE
STANDING UP FROM CHAIR USING ARMS: A LITTLE
TURNING FROM BACK TO SIDE WHILE IN FLAT BAD: A LITTLE
HELP NEEDED FOR BATHING: A LITTLE
TOILETING: A LITTLE
CLIMB 3 TO 5 STEPS WITH RAILING: A LITTLE
MOVING TO AND FROM BED TO CHAIR: A LITTLE
DRESSING REGULAR LOWER BODY CLOTHING: A LITTLE
MOVING TO AND FROM BED TO CHAIR: A LITTLE
DAILY ACTIVITIY SCORE: 20
DRESSING REGULAR LOWER BODY CLOTHING: A LITTLE

## 2024-10-17 ASSESSMENT — PAIN - FUNCTIONAL ASSESSMENT
PAIN_FUNCTIONAL_ASSESSMENT: 0-10

## 2024-10-17 ASSESSMENT — ACTIVITIES OF DAILY LIVING (ADL): HOME_MANAGEMENT_TIME_ENTRY: 28

## 2024-10-17 NOTE — PROGRESS NOTES
[facility]      10/17/24 1101   Discharge Planning   Expected Discharge Disposition Home H  ([facility])

## 2024-10-17 NOTE — NURSING NOTE
Rounded on pt. Pt laying in bed, alert, aox4. Pt denies pain or nausea. Pt just returned from bed from using BSC. Pt has voided prior to each bladder scan order, so bladder scan not performed. Pt has no other needs or complaints at this time. R knee hemovac has small amount of red drainage, still compressed, not emptied at this time. Dressing over RLE c/d/intact. Pt oriented to call bell and it and belongings are placed in reach. Bed alarm set. Continuing to monitor.

## 2024-10-17 NOTE — PROGRESS NOTES
Keisha Baxter is a 84 y.o. female on day 0 of admission presenting with Arthritis of right knee.      Subjective   Patient seen and examined.  Awake/alert/oriented.  Symptoms have resolved.  Orthostatics negative.  H&H did drop this morning however repeat this afternoon was stable.  No signs and symptoms of active bleeding.  Denies chest pain, shortness of breath, fevers, chills, nausea, or vomiting.     Objective     Last Recorded Vitals  /61 (BP Location: Left arm, Patient Position: Sitting)   Pulse 82   Temp 35.8 °C (96.4 °F) (Oral)   Resp 20   Wt 78 kg (171 lb 15.3 oz)   SpO2 98%   Intake/Output last 3 Shifts:    Intake/Output Summary (Last 24 hours) at 10/17/2024 1324  Last data filed at 10/17/2024 1216  Gross per 24 hour   Intake 2318.34 ml   Output 1420 ml   Net 898.34 ml       Admission Weight  Weight: 74.4 kg (164 lb) (10/15/24 0601)    Daily Weight  10/17/24 : 78 kg (171 lb 15.3 oz)    Image Results  ECG 12 Lead  Normal sinus rhythm  Left ventricular hypertrophy with repolarization abnormality  Abnormal ECG  When compared with ECG of 03-OCT-2024 10:29,  No significant change was found  Confirmed by Munir Morgan ([id number]) on 10/17/2024 11:21:01 AM      Physical Exam  General: Well developed. NAD.  HEENT:  EOMI. Pink conjunctiva.   Cardiovascular: S1, S2. RRR.   Respiratory: Breath sounds clear bilaterally. No cyanosis. No hypoxia.  GI: Abdomen soft, nontender. Bowel sounds present in all quadrants .   MS: Right surgical knee  Neuro: Alert, oriented.   Skin: Warm, dry.  Surgical dressing intact.    Relevant Results  Lab Results   Component Value Date    GLUCOSE 102 (H) 10/17/2024    CALCIUM 7.9 (L) 10/17/2024     (L) 10/17/2024    K 3.8 10/17/2024    CO2 24 10/17/2024     10/17/2024    BUN 12 10/17/2024    CREATININE 0.43 (L) 10/17/2024      Lab Results   Component Value Date    WBC 7.7 10/17/2024    HGB 10.1 (L) 10/17/2024    HCT 30.8 (L) 10/17/2024    MCV 95 10/17/2024      (L) 10/17/2024    XR knee right 1-2 views    Result Date: 10/15/2024  Interpreted By:  Juan Manuel Youngblood, STUDY: XR KNEE RIGHT 1-2 VIEWS; ;  10/15/2024 10:44 am   INDICATION: Signs/Symptoms:Post op knee.     COMPARISON: None.   ACCESSION NUMBER(S): [id number]   ORDERING CLINICIAN: DANETTE PHILLIPS   FINDINGS: Right knee, two views   Interval total knee arthroplasty in place. Surgical drain present. Large effusion. Prepatellar soft tissue edema right       No hardware failure about the right total knee arthroplasty   MACRO: None   Signed by: Juan Manuel Youngblood 10/15/2024 10:55 AM Dictation workstation:   [id number]           Assessment/Plan      Assessment & Plan  Arthritis of right knee  Hypotension-resolved  Stat H&H-stable  Repeat orthos negative  UA positive, culture pending, will send home on antibiotics  EKG normal sinus rhythm  Stable for discharge from medical standpoint    Recent TIA  Continue statin, Plavix resumed per Ortho  Started on aspirin twice daily per orthopedic surgery for DVT prophylaxis  Follow-up with neurology outpatient     Right knee osteoarthritis  Status post right total knee arthroplasty subvastus approach by Dr. Phillips on 10/15/2024  Pain management  Incentive spirometer  PT/OT/out of bed  DVT prophylaxis  Orthopedic surgery managing     Hypertension  Resume home antihypertensive     Mixed hyperlipidemia  Resume home statin     Nausea and vomiting-resolved  As needed Zofran/Compazine     Hypothyroidism  Resume home levothyroxine     Plan  Pain management/bowel regimen  Encourage incentive spirometer  PT/OT  DVT prophylaxis  Discharge per orthopedic surgery    Improved overnight with IV fluids.  UA positive, culture still pending.  Will cover with antibiotics, will send prescription.  H&H stable.  No signs and symptoms of bleeding.  No absolute contraindication to discharge from a medical standpoint.                Carly Sanders, APRN-CNP

## 2024-10-17 NOTE — CARE PLAN
Problem: Pain - Adult  Goal: Verbalizes/displays adequate comfort level or baseline comfort level  Outcome: Progressing     Problem: Safety - Adult  Goal: Free from fall injury  Outcome: Progressing     Problem: Discharge Planning  Goal: Discharge to home or other facility with appropriate resources  Outcome: Progressing     Problem: Chronic Conditions and Co-morbidities  Goal: Patient's chronic conditions and co-morbidity symptoms are monitored and maintained or improved  Outcome: Progressing     Problem: Pain  Goal: Takes deep breaths with improved pain control throughout the shift  Outcome: Progressing  Goal: Turns in bed with improved pain control throughout the shift  Outcome: Progressing  Goal: Walks with improved pain control throughout the shift  Outcome: Progressing  Goal: Performs ADL's with improved pain control throughout shift  Outcome: Progressing  Goal: Participates in PT with improved pain control throughout the shift  Outcome: Progressing  Goal: Free from opioid side effects throughout the shift  Outcome: Progressing  Goal: Free from acute confusion related to pain meds throughout the shift  Outcome: Progressing     Problem: Fall/Injury  Goal: Not fall by end of shift  Outcome: Progressing  Goal: Be free from injury by end of the shift  Outcome: Progressing  Goal: Verbalize understanding of personal risk factors for fall in the hospital  Outcome: Progressing  Goal: Verbalize understanding of risk factor reduction measures to prevent injury from fall in the home  Outcome: Progressing  Goal: Use assistive devices by end of the shift  Outcome: Progressing  Goal: Pace activities to prevent fatigue by end of the shift  Outcome: Progressing   The patient's goals for the shift include rest    The clinical goals for the shift include stable vs, inc ADLs, manage pain    Over the shift, the patient did make progress toward the goals.

## 2024-10-17 NOTE — ASSESSMENT & PLAN NOTE
Hypotension-resolved  Stat H&H-stable  Repeat orthos negative  UA positive, culture pending, will send home on antibiotics  EKG normal sinus rhythm  Stable for discharge from medical standpoint    Recent TIA  Continue statin, Plavix resumed per Ortho  Started on aspirin twice daily per orthopedic surgery for DVT prophylaxis  Follow-up with neurology outpatient     Right knee osteoarthritis  Status post right total knee arthroplasty subvastus approach by Dr. Phillips on 10/15/2024  Pain management  Incentive spirometer  PT/OT/out of bed  DVT prophylaxis  Orthopedic surgery managing     Hypertension  Resume home antihypertensive     Mixed hyperlipidemia  Resume home statin     Nausea and vomiting-resolved  As needed Zofran/Compazine     Hypothyroidism  Resume home levothyroxine     Plan  Pain management/bowel regimen  Encourage incentive spirometer  PT/OT  DVT prophylaxis  Discharge per orthopedic surgery    Improved overnight with IV fluids.  UA positive, culture still pending.  Will cover with antibiotics, will send prescription.  H&H stable.  No signs and symptoms of bleeding.  No absolute contraindication to discharge from a medical standpoint.

## 2024-10-17 NOTE — HH CARE COORDINATION
Home Care received a Referral for Physical Therapy. We have processed the referral for a Start of Care on 10/18.     If you have any questions or concerns, please feel free to contact us at [phone]. Follow the prompts, enter your five digit zip code, and you will be directed to your care team on [facility].

## 2024-10-17 NOTE — PROGRESS NOTES
Occupational Therapy    OT Treatment    Patient Name: Keisha Baxter  MRN: [id number]  Department: [address]  Room: [facility]449-  Today's Date: 10/17/2024  Time Calculation  Start Time: 0836  Stop Time: 0914  Time Calculation (min): 38 min        Assessment:  OT Assessment: Pt is demonstrating good functional progress towards her established goals and is feeling much better today with no episodes. Continue POC.  Prognosis: Good  Evaluation/Treatment Tolerance: Patient tolerated treatment well  End of Session Communication: Bedside nurse  End of Session Patient Position: Up in chair, Alarm off, caregiver present (Needs in reach.)    Plan:  Treatment Interventions: ADL retraining, Functional transfer training, UE strengthening/ROM, Endurance training, Equipment evaluation/education, Compensatory technique education  OT Frequency: 5 times per week  OT Discharge Recommendations: Low intensity level of continued care, Other (Comment)  Equipment Recommended upon Discharge: Wheeled walker  OT Recommended Transfer Status: Stand by assist  OT - OK to Discharge: Yes      Subjective     Previous Visit Info:  OT Last Visit  OT Received On: 10/17/24    General:  General  Family/Caregiver Present: No  Prior to Session Communication: Bedside nurse  Patient Position Received: Bed, 3 rail up, Alarm off, not on at start of session  General Comment: Cleared by nurse prior to session and pt agreeable to OT treatment.    Precautions:  LE Weight Bearing Status: Weight Bearing as Tolerated (RLE)  Medical Precautions: Fall precautions  Post-Surgical Precautions: Right total knee precautions    Pain:  Pain Assessment  Pain Assessment: 0-10  0-10 (Numeric) Pain Score: 8  Pain Type: Surgical pain  Pain Location: Knee  Pain Orientation: Right  Pain Interventions: Ambulation/increased activity, Other (Comment) (Nurse aware and pt will be getting pain meds soon.)    Objective      Cognition:  Cognition  Overall Cognitive Status: Within Functional  Limits  Orientation Level: Oriented X4    Activities of Daily Living: Grooming  Grooming Level of Assistance: Close supervision, Setup  Grooming Where Assessed: Standing sinkside  Grooming Comments: Pt provided with all supplies beforehand and completed washing her hands, brushing her teeth, and brushing her hair while standing at the sink with supervision for safety.    Toileting  Toileting Level of Assistance: Contact guard  Where Assessed: Toilet  Toileting Comments: Pt completed clothing management in standing with CGA for balance/safety and pericare seated on toilet independently.    Bed Mobility/Transfers: Bed Mobility  Bed Mobility: Yes  Bed Mobility 1  Bed Mobility 1: Supine to sitting  Level of Assistance 1: Distant supervision    Transfers  Transfer: Yes  Transfer 1  Transfer From 1: Bed to  Transfer to 1: Stand  Technique 1: Sit to stand  Transfer Device 1: Walker  Transfer Level of Assistance 1: Close supervision  Trials/Comments 1: Supervision for safety.  Transfers 2  Transfer From 2: Stand to  Transfer to 2: Chair with arms  Technique 2: Stand to sit  Transfer Device 2: Walker  Transfer Level of Assistance 2: Close supervision  Trials/Comments 2: Supervision for safety.    Toilet Transfers  Toilet Transfer From: Walker  Toilet Transfer Type: To and from  Toilet Transfer to: Standard toilet  Toilet Transfer Technique: Ambulating  Toilet Transfers: Contact guard  Toilet Transfers Comments: Steadying assist for balance/safety and verbal cues as needed.       Car Transfers  Car Transfers Comments: Educated pt on proper technique with tips.    Functional Mobility:  Functional Mobility  Functional Mobility Performed: Yes  Functional Mobility 1  Device 1: Rolling walker  Assistance 1: Contact guard, Close supervision  Comments 1: Pt completed functional mobility to/from the bathroom using a RW with CGA for balance to close S for safety and no reported episodes today of dizziness/lightheadedness like  yesterday.    Therapy/Activity: Therapeutic Activity  Therapeutic Activity Performed:  (Educated pt on walker bag/tray use at home for transporting items.)      Outcome Measures:[facility] Daily Activity  Putting on and taking off regular lower body clothing: A little  Bathing (including washing, rinsing, drying): A little  Putting on and taking off regular upper body clothing: None  Toileting, which includes using toilet, bedpan or urinal: A little  Taking care of personal grooming such as brushing teeth: A little  Eating Meals: None  Daily Activity - Total Score: 20      Education Documentation  No documentation found.  Education Comments  No comments found.      Goals:  Encounter Problems       Encounter Problems (Active)       ADLs       Patient with complete lower body dressing with modified independent level of assistance donning and doffing all LE clothes  with PRN adaptive equipment while edge of bed  (Progressing)       Start:  10/15/24    Expected End:  11/15/24            Patient will complete daily grooming tasks with independent level of assistance and PRN adaptive equipment while standing. (Progressing)       Start:  10/15/24    Expected End:  11/15/24            Patient will complete toileting including hygiene clothing management/hygiene with modified independent level of assistance and raised toilet seat and grab bars. (Progressing)       Start:  10/15/24    Expected End:  11/15/24               COGNITION/SAFETY       Patient will recall and adhere to knee precautions during all functional mobility/ADL tasks in order to demonstrate improved understanding and promote healing post op (Progressing)       Start:  10/15/24    Expected End:  11/15/24               MOBILITY       Patient will perform Functional mobility max Household distances/Community Distances with modified independent level of assistance and least restrictive device in order to improve safety and functional mobility. (Progressing)        Start:  10/15/24    Expected End:  11/15/24

## 2024-10-17 NOTE — NURSING NOTE
Knee accordion drain removed. Monitoring for excessive bleeding. ABD dressing and thigh high on. Care ongoing.

## 2024-10-17 NOTE — NURSING NOTE
Assumed care of pt. Pt resting quietly in bed at this time. BSSR complete. Respirations even and unlabored on RA. No further needs at thsi time. Bed locked in low and call light within reach.

## 2024-10-17 NOTE — NURSING NOTE
At pt's bedside after assisting her to and from BSC. Pt laying in bed, alert, aox4. Pt denies pain or nausea. Pt has voided again prior to bladder scan order, so bladder scan not performed. But I did perform a post-void bladder scan charted in I&Os -67 ml. Pt has no other needs or complaints at this time. R knee hemovac has small amount of red drainage, still compressed, not emptied at this time. Dressing over RLE c/d/intact. Uneventful night with no changes since prior assessment. Pt oriented to call bell and it and belongings are placed in reach. Bed alarm set. Continuing to monitor.

## 2024-10-17 NOTE — PROGRESS NOTES
Physical Therapy    Physical Therapy Treatment    Patient Name: Keisha Baxter  MRN: [id number]  Department: [address]  Room: [facility]449-A  Today's Date: 10/17/2024  Time Calculation  Start Time: 1103  Stop Time: 1130  Time Calculation (min): 27 min         Assessment/Plan   PT Assessment  Rehab Prognosis: Good  Evaluation/Treatment Tolerance: Patient limited by fatigue  End of Session Communication: Bedside nurse  Assessment Comment: Improving slowly with functional mobility;  progressing slowly with tolerance to activity;  fall risk  End of Session Patient Position:  (Sitting on side of bed with student nurse)  PT Plan  Inpatient/Swing Bed or Outpatient: Inpatient  PT Plan  Treatment/Interventions: Bed mobility, Transfer training, Gait training, Balance training, Strengthening, Endurance training, Range of motion, Therapeutic exercise, Therapeutic activity, Home exercise program  PT Plan: Ongoing PT  PT Frequency: BID  PT Discharge Recommendations: Low intensity level of continued care  PT Recommended Transfer Status: Stand by assist  PT - OK to Discharge: Yes (with skilled physical therapy services at next level of care)      General Visit Information:   PT  Visit  PT Received On: 10/17/24  General  Prior to Session Communication: Bedside nurse  Patient Position Received: Bed, 3 rail up, Alarm off, not on at start of session  General Comment: RN cleared patient for treatment.  Patient reports agreeable to treatment.    Subjective   Precautions:  Precautions  LE Weight Bearing Status: Weight Bearing as Tolerated  Medical Precautions: Fall precautions  Post-Surgical Precautions: Right total knee precautions            Objective   Pain:  Pain Assessment  Pain Assessment: 0-10  0-10 (Numeric) Pain Score: 1  Pain Type: Surgical pain, Acute pain  Pain Location: Knee  Pain Orientation: Right  Cognition:  Cognition  Overall Cognitive Status: Within Functional Limits  Coordination:  Movements are Fluid and Coordinated:  No  Lower Body Coordination: Slower rate of movement right LE  Postural Control:  Static Sitting Balance  Static Sitting-Balance Support: Bilateral upper extremity supported  Static Sitting-Level of Assistance: Modified independent  Static Sitting-Comment/Number of Minutes: Supervision with balance while sitting on side of bed  Static Standing Balance  Static Standing-Balance Support: Bilateral upper extremity supported  Static Standing-Level of Assistance: Distant supervision  Static Standing-Comment/Number of Minutes: Supervision with balance during static standing with rolling walker          Activity Tolerance:  Activity Tolerance  Endurance: Decreased tolerance for upright activites  Activity Tolerance Comments: Fatigue  Treatments:  Therapeutic Exercise  Therapeutic Exercise Performed: Yes  Therapeutic Exercise Activity 1: Ankle pumps, quad sets, heel slides, SLR, TKE 10 reps x 1 set with assist right LE;  written HEP issued    Therapeutic Activity  Therapeutic Activity Performed: Yes  Therapeutic Activity 1: Static standing with rolling walker x ~2 minutes with supervision with balance         Bed Mobility  Bed Mobility: Yes  Bed Mobility 1  Bed Mobility 1: Supine to sitting  Level of Assistance 1: Independent  Bed Mobility Comments 1: Increased time and effort required to achieve supine to sit    Ambulation/Gait Training  Ambulation/Gait Training Performed: Yes  Ambulation/Gait Training 1  Surface 1: Level tile  Device 1: Rolling walker  Assistance 1: Close supervision  Comments/Distance (ft) 1: 15 feet x 2 with rolling walker and supervision with balance;  patient ambulates with slow esau, non-reciprocating gait pattern, decreased step length marcia LE (right greater than left), and decreased stance phase right LE relative to left LE.  Transfers  Transfer: Yes  Transfer 1  Transfer From 1: Sit to  Transfer to 1: Stand  Technique 1: Sit to stand  Transfer Device 1: Walker  Transfer Level of Assistance 1:  Close supervision  Trials/Comments 1: x 2 reps;  supervision with balance;  verbal cues for right LE position  Transfers 2  Transfer From 2: Stand to  Transfer to 2: Sit  Technique 2: Stand to sit  Transfer Device 2: Walker  Transfer Level of Assistance 2: Close supervision  Trials/Comments 2: Supervison with balance    Stairs  Stairs: No         Outcome Measures:  [facility] Basic Mobility  Turning from your back to your side while in a flat bed without using bedrails: None  Moving from lying on your back to sitting on the side of a flat bed without using bedrails: None  Moving to and from bed to chair (including a wheelchair): A little  Standing up from a chair using your arms (e.g. wheelchair or bedside chair): A little  To walk in hospital room: A little  Climbing 3-5 steps with railing: A little  Basic Mobility - Total Score: 20    Education Documentation  No documentation found.  Education Comments  No comments found.        OP EDUCATION:       Encounter Problems       Encounter Problems (Active)       Mobility       Bed mobility including supine to sit and sit to supine with supervision. (Met)       Start:  10/15/24    Expected End:  10/29/24    Resolved:  10/17/24         Transfers including sit to stand and stand to sit with supervision. (Progressing)       Start:  10/15/24    Expected End:  10/29/24            Ambulate 60 feet with rolling walker and supervision. (Progressing)       Start:  10/15/24    Expected End:  10/29/24               Pain - Adult

## 2024-10-17 NOTE — CARE PLAN
Problem: Mobility  Goal: Transfers including sit to stand and stand to sit with supervision.  Outcome: Progressing  Goal: Ambulate 60 feet with rolling walker and supervision.  Outcome: Progressing     Problem: Mobility  Goal: Bed mobility including supine to sit and sit to supine with supervision.  Outcome: Met

## 2024-10-17 NOTE — NURSING NOTE
Took over care of pt at this time. Pt laying in bed, alert, aox4. Pt denies pain or nausea. Pt has no other needs or complaints at this time. R knee hemovac has small amount of red drainage, still compressed, not emptied at this time. Dressing over RLE c/d/intact. Pt oriented to call bell and it and belongings are placed in reach. Bed alarm set. Continuing to monitor.

## 2024-10-17 NOTE — DISCHARGE SUMMARY
Discharge Diagnosis  Arthritis of right knee    Issues Requiring Follow-Up  Right total knee    Test Results Pending At Discharge  Pending Labs       Order Current Status    Urine Culture In process            Hospital Course   Patient brought to OR day of admin for right total knee. Uncomplicated usrgery admitted to [facility]. Stable for transfer home on POD 2    Pertinent Physical Exam At Time of Discharge  Physical Exam    Home Medications     Medication List      START taking these medications     acetaminophen 500 mg tablet; Commonly known as: Tylenol; Take 2 tablets   (1,000 mg) by mouth every 6 hours if needed for mild pain (1 - 3) for up   to 15 days.   aspirin 81 mg EC tablet; Take 1 tablet (81 mg) by mouth 2 times a day   for 28 days.   docusate sodium 100 mg capsule; Commonly known as: Colace; Take 1   capsule (100 mg) by mouth 2 times a day for 15 days.   ibuprofen 600 mg tablet; Take 1 tablet (600 mg) by mouth every 6 hours   if needed for mild pain (1 - 3) for up to 5 days.   oxyCODONE 5 mg immediate release tablet; Commonly known as: Roxicodone;   Take 1 tablet (5 mg) by mouth every 4 hours if needed for severe pain (7 -   10) for up to 7 days.     CONTINUE taking these medications     cholecalciferol 5,000 Units tablet; Commonly known as: Vitamin D-3   clopidogrel 75 mg tablet; Commonly known as: Plavix; Take 1 tablet (75   mg) by mouth once daily.   cyanocobalamin (vitamin B-12) 5,000 mcg tablet, sublingual   desmopressin 0.2 mg tablet; Commonly known as: DDAVP   levothyroxine 100 mcg tablet; Commonly known as: Synthroid, Levoxyl;   Take 1 tablet (100 mcg) by mouth once daily in the morning. Take before   meals.   lisinopril 10 mg tablet; Take 1 tablet (10 mg) by mouth once daily.   simvastatin 40 mg tablet; Commonly known as: Zocor; Take 1 tablet (40   mg) by mouth once daily at bedtime.   traZODone 50 mg tablet; Commonly known as: Desyrel; Take 1 tablet (50   mg) by mouth as needed at bedtime for  sleep.       Outpatient Follow-Up  Future Appointments   Date Time Provider Department [place]   11/4/2024 11:15 AM Sharon Wolff PT [id number]   11/25/2024  1:00 PM DO XAVIER [email or web address]   1/13/2025  9:30 AM Ange Frey DO [email or web address] Job Phillips MD

## 2024-10-18 ENCOUNTER — HOME CARE VISIT (OUTPATIENT)
Dept: HOME HEALTH SERVICES | Facility: HOME HEALTH | Age: 84
End: 2024-10-18
Payer: MEDICARE

## 2024-10-18 ENCOUNTER — DOCUMENTATION (OUTPATIENT)
Dept: PRIMARY CARE | Facility: CLINIC | Age: 84
End: 2024-10-18
Payer: COMMERCIAL

## 2024-10-18 ENCOUNTER — PATIENT OUTREACH (OUTPATIENT)
Dept: PRIMARY CARE | Facility: CLINIC | Age: 84
End: 2024-10-18
Payer: COMMERCIAL

## 2024-10-18 VITALS
OXYGEN SATURATION: 97 % | RESPIRATION RATE: 18 BRPM | TEMPERATURE: 97.6 F | SYSTOLIC BLOOD PRESSURE: 130 MMHG | HEART RATE: 92 BPM | DIASTOLIC BLOOD PRESSURE: 70 MMHG

## 2024-10-18 PROCEDURE — G0151 HHCP-SERV OF PT,EA 15 MIN: HCPCS | Mod: HHH

## 2024-10-18 PROCEDURE — 169592 NO-PAY CLAIM PROCEDURE

## 2024-10-18 SDOH — HEALTH STABILITY: PHYSICAL HEALTH: EXERCISE ACTIVITY: APS, GLUTE/QUAD SETS, HEEL SLIDE SLR, SAQ, HIP ABD

## 2024-10-18 SDOH — HEALTH STABILITY: PHYSICAL HEALTH: PHYSICAL EXERCISE: 10

## 2024-10-18 SDOH — HEALTH STABILITY: PHYSICAL HEALTH: PHYSICAL EXERCISE: SUPINE

## 2024-10-18 SDOH — HEALTH STABILITY: PHYSICAL HEALTH: EXERCISE ACTIVITIES SETS: 1

## 2024-10-18 SDOH — HEALTH STABILITY: PHYSICAL HEALTH

## 2024-10-18 ASSESSMENT — ACTIVITIES OF DAILY LIVING (ADL)
OASIS_M1830: 03
AMBULATION_DISTANCE/DURATION_TOLERATED: 50 FEET
PHYSICAL TRANSFERS ASSESSED: 1
ENTERING_EXITING_HOME: CONTACT GUARD ASSIST
AMBULATION ASSISTANCE: 1
AMBULATION ASSISTANCE: CONTACT GUARD ASSIST
CURRENT_FUNCTION: ONE PERSON
AMBULATION ASSISTANCE: ONE PERSON
AMBULATION ASSISTANCE ON FLAT SURFACES: 1
CURRENT_FUNCTION: CONTACT GUARD ASSIST

## 2024-10-18 ASSESSMENT — ENCOUNTER SYMPTOMS
PAIN LOCATION - PAIN QUALITY: ACHING
PAIN: 1
PAIN LOCATION: RIGHT KNEE
MUSCLE WEAKNESS: 1
PERSON REPORTING PAIN: PATIENT
LIMITED RANGE OF MOTION: 1
LOWEST PAIN SEVERITY IN PAST 24 HOURS: 2/10
PAIN LOCATION - RELIEVING FACTORS: REST, ICE, MEDS
OCCASIONAL FEELINGS OF UNSTEADINESS: 1
PAIN LOCATION - EXACERBATING FACTORS: MOVEMENT
PAIN LOCATION - PAIN SEVERITY: 2/10
HIGHEST PAIN SEVERITY IN PAST 24 HOURS: 5/10

## 2024-10-18 NOTE — PROGRESS NOTES
Discharge Facility: [facility]  Discharge Diagnosis: Arthritis of right knee  Admission Date: 10/15/2024  Discharge Date: 10/17/2024    PCP Appointment Date: Patient has declined. She would like to keep scheduled appointment on 11/25/2024  Specialist Appointment Date: None  Hospital Encounter and Summary Linked: Yes    See discharge assessment below for further details    Medications  Medications reviewed with patient/caregiver?: Yes (10/18/2024  9:53 AM)  Is the patient having any side effects they believe may be caused by any medication additions or changes?: (!) Yes (patient states that she took the prescribed Ceftin and had nausea, abdominal pain, dizziness and felt unwell) (10/18/2024  9:53 AM)  Does the patient have all medications ordered at discharge?: Yes (10/18/2024  9:53 AM)  Care Management Interventions: -- (Contacted prescriber via secure chat) (10/18/2024  9:53 AM)  Prescription Comments: Scripts given at discharge for Tylenol, Aspirin, Colace, Ibuprofen, Oxycodone and Ceftin (10/18/2024  9:53 AM)  Is the patient taking all medications as directed (includes completed medication regime)?: Yes (10/18/2024  9:53 AM)  Medication Comments: Patient denies any issues obtaining or affording medication (10/18/2024  9:53 AM)    Appointments  Does the patient have a primary care provider?: Yes (10/18/2024  9:53 AM)  Care Management Interventions: -- (Patient has declined. She would like to keep scheduled appointment on 11/25/2024) (10/18/2024  9:53 AM)  Has the patient kept scheduled appointments due by today?: Yes (10/18/2024  9:53 AM)    Self Management  What is the home health agency?: [facility] (10/18/2024  9:53 AM)  What Durable Medical Equipment (DME) was ordered?: N/A (10/18/2024  9:53 AM)    Patient Teaching  Does the patient have access to their discharge instructions?: Yes (10/18/2024  9:53 AM)  Care Management Interventions: Reviewed instructions with patient (10/18/2024  9:53 AM)  What is the patient's  perception of their health status since discharge?: Improving (10/18/2024  9:53 AM)  Is the patient/caregiver able to teach back the hierarchy of who to call/visit for symptoms/problems? PCP, Specialist, Home Health nurse, Urgent Care, ED, 911: Yes (10/18/2024  9:53 AM)  Patient/Caregiver Education Comments: CM spoke to patient via phone. She states that she is doing well at home. She has all discharge medication. She states that she took the prescribed Ceftin and had nausea, abdominal pain, dizziness and felt unwell. Message will be sent to the prescribing provider. Patient has declined. She would like to keep scheduled appointment on 11/25/2024. She was very thankful for this call. (10/18/2024  9:53 AM)

## 2024-10-22 ENCOUNTER — HOME CARE VISIT (OUTPATIENT)
Dept: HOME HEALTH SERVICES | Facility: HOME HEALTH | Age: 84
End: 2024-10-22
Payer: MEDICARE

## 2024-10-22 VITALS
DIASTOLIC BLOOD PRESSURE: 72 MMHG | TEMPERATURE: 97.6 F | SYSTOLIC BLOOD PRESSURE: 124 MMHG | RESPIRATION RATE: 18 BRPM | OXYGEN SATURATION: 98 % | HEART RATE: 80 BPM

## 2024-10-22 PROCEDURE — G0151 HHCP-SERV OF PT,EA 15 MIN: HCPCS | Mod: HHH

## 2024-10-22 SDOH — HEALTH STABILITY: PHYSICAL HEALTH: EXERCISE ACTIVITY: APS, MARCHING, LAQ, HEEL SLIDE, HIP ABD/ADD

## 2024-10-22 SDOH — HEALTH STABILITY: PHYSICAL HEALTH

## 2024-10-22 SDOH — HEALTH STABILITY: PHYSICAL HEALTH: PHYSICAL EXERCISE: 10

## 2024-10-22 SDOH — HEALTH STABILITY: PHYSICAL HEALTH: PHYSICAL EXERCISE: SUPINE

## 2024-10-22 SDOH — HEALTH STABILITY: PHYSICAL HEALTH: EXERCISE ACTIVITIES SETS: 1

## 2024-10-22 SDOH — HEALTH STABILITY: PHYSICAL HEALTH: EXERCISE ACTIVITY: APS, GLUTE/QUAD SETS, HEEL SLIDE, SLR, SAQ, HIP ABD

## 2024-10-22 SDOH — HEALTH STABILITY: PHYSICAL HEALTH: PHYSICAL EXERCISE: SITTING

## 2024-10-22 ASSESSMENT — ENCOUNTER SYMPTOMS
SUBJECTIVE PAIN PROGRESSION: GRADUALLY IMPROVING
PAIN LOCATION - EXACERBATING FACTORS: MOVEMENT
PERSON REPORTING PAIN: PATIENT
PAIN LOCATION - PAIN SEVERITY: 1/10
PAIN LOCATION - RELIEVING FACTORS: REST, ICE, MEDS
PAIN: 1
PAIN LOCATION - PAIN QUALITY: ACHING
HIGHEST PAIN SEVERITY IN PAST 24 HOURS: 1/10
PAIN LOCATION: RIGHT KNEE

## 2024-10-25 ENCOUNTER — HOME CARE VISIT (OUTPATIENT)
Dept: HOME HEALTH SERVICES | Facility: HOME HEALTH | Age: 84
End: 2024-10-25
Payer: MEDICARE

## 2024-10-25 VITALS
HEART RATE: 82 BPM | RESPIRATION RATE: 18 BRPM | DIASTOLIC BLOOD PRESSURE: 72 MMHG | OXYGEN SATURATION: 98 % | TEMPERATURE: 97.6 F | SYSTOLIC BLOOD PRESSURE: 138 MMHG

## 2024-10-25 PROCEDURE — G0151 HHCP-SERV OF PT,EA 15 MIN: HCPCS | Mod: HHH

## 2024-10-25 SDOH — HEALTH STABILITY: PHYSICAL HEALTH: EXERCISE ACTIVITIES SETS: 1

## 2024-10-25 SDOH — HEALTH STABILITY: PHYSICAL HEALTH: PHYSICAL EXERCISE: 10

## 2024-10-25 SDOH — HEALTH STABILITY: PHYSICAL HEALTH

## 2024-10-25 SDOH — HEALTH STABILITY: PHYSICAL HEALTH: PHYSICAL EXERCISE: SUPINE

## 2024-10-25 SDOH — HEALTH STABILITY: PHYSICAL HEALTH: EXERCISE ACTIVITY: APS, MARCHING, LAQ, HEEL SLIDE, HIP ABD/ADD

## 2024-10-25 SDOH — HEALTH STABILITY: PHYSICAL HEALTH: PHYSICAL EXERCISE: SITTING

## 2024-10-25 SDOH — HEALTH STABILITY: PHYSICAL HEALTH: EXERCISE ACTIVITY: APS, GLUTE/QUAD SETS, HEEL SLIDE, SLR, SAQ, HIP ABD

## 2024-10-25 SDOH — HEALTH STABILITY: PHYSICAL HEALTH: EXERCISE ACTIVITY: CALF RAISES, HIP FLEX/EXT/ABD

## 2024-10-25 SDOH — HEALTH STABILITY: PHYSICAL HEALTH: PHYSICAL EXERCISE: STANDING

## 2024-10-25 ASSESSMENT — ENCOUNTER SYMPTOMS
PAIN LOCATION - RELIEVING FACTORS: REST, ICE, MEDS
PAIN LOCATION: RIGHT KNEE
SUBJECTIVE PAIN PROGRESSION: GRADUALLY IMPROVING
PAIN: 1
PAIN LOCATION - EXACERBATING FACTORS: MOVEMENT
LOWEST PAIN SEVERITY IN PAST 24 HOURS: 0/10
PAIN LOCATION - PAIN QUALITY: ACHING
PAIN LOCATION - PAIN SEVERITY: 3/10
PERSON REPORTING PAIN: PATIENT
HIGHEST PAIN SEVERITY IN PAST 24 HOURS: 3/10

## 2024-10-29 ENCOUNTER — HOME CARE VISIT (OUTPATIENT)
Dept: HOME HEALTH SERVICES | Facility: HOME HEALTH | Age: 84
End: 2024-10-29
Payer: MEDICARE

## 2024-10-29 VITALS — OXYGEN SATURATION: 99 % | TEMPERATURE: 97.3 F | HEART RATE: 69 BPM | RESPIRATION RATE: 18 BRPM

## 2024-10-29 PROCEDURE — G0151 HHCP-SERV OF PT,EA 15 MIN: HCPCS | Mod: HHH

## 2024-10-29 SDOH — HEALTH STABILITY: PHYSICAL HEALTH: PHYSICAL EXERCISE: 10

## 2024-10-29 SDOH — HEALTH STABILITY: PHYSICAL HEALTH: EXERCISE ACTIVITIES SETS: 1

## 2024-10-29 SDOH — HEALTH STABILITY: PHYSICAL HEALTH

## 2024-10-29 SDOH — HEALTH STABILITY: PHYSICAL HEALTH: PHYSICAL EXERCISE: SITTING

## 2024-10-29 SDOH — HEALTH STABILITY: PHYSICAL HEALTH: PHYSICAL EXERCISE: SUPINE

## 2024-10-29 SDOH — HEALTH STABILITY: PHYSICAL HEALTH: EXERCISE ACTIVITY: APS, GLUTE/QUAD SETS, HEEL SLIDE, SLR, SAQ, HIP ABD

## 2024-10-29 SDOH — HEALTH STABILITY: PHYSICAL HEALTH: EXERCISE ACTIVITY: APS, MARCHING, HEEL SLIDE, LAQ, HIP ABD/ADD

## 2024-10-29 SDOH — HEALTH STABILITY: PHYSICAL HEALTH: EXERCISE ACTIVITY: CALF RAISES, HIP FLEX/EXT/ABD

## 2024-10-29 ASSESSMENT — ENCOUNTER SYMPTOMS
PAIN LOCATION - PAIN QUALITY: STIFFNESS
PAIN: 1
PAIN LOCATION: RIGHT KNEE
PERSON REPORTING PAIN: PATIENT
PAIN LOCATION - PAIN SEVERITY: 0/10
HIGHEST PAIN SEVERITY IN PAST 24 HOURS: 2/10
LOWEST PAIN SEVERITY IN PAST 24 HOURS: 0/10
PAIN LOCATION - EXACERBATING FACTORS: MOVEMENT
PAIN LOCATION - RELIEVING FACTORS: REST, ICE, MEDS

## 2024-11-01 ENCOUNTER — HOME CARE VISIT (OUTPATIENT)
Dept: HOME HEALTH SERVICES | Facility: HOME HEALTH | Age: 84
End: 2024-11-01
Payer: MEDICARE

## 2024-11-01 ENCOUNTER — PATIENT OUTREACH (OUTPATIENT)
Dept: PRIMARY CARE | Facility: CLINIC | Age: 84
End: 2024-11-01
Payer: MEDICARE

## 2024-11-01 VITALS
TEMPERATURE: 97.2 F | RESPIRATION RATE: 18 BRPM | OXYGEN SATURATION: 99 % | SYSTOLIC BLOOD PRESSURE: 124 MMHG | HEART RATE: 62 BPM | DIASTOLIC BLOOD PRESSURE: 70 MMHG

## 2024-11-01 PROCEDURE — G0151 HHCP-SERV OF PT,EA 15 MIN: HCPCS | Mod: HHH

## 2024-11-01 ASSESSMENT — ACTIVITIES OF DAILY LIVING (ADL)
AMBULATION ASSISTANCE: INDEPENDENT
CURRENT_FUNCTION: INDEPENDENT
AMBULATION_DISTANCE/DURATION_TOLERATED: 200 FEET
AMBULATION ASSISTANCE: 1
OASIS_M1830: 01
AMBULATION ASSISTANCE ON FLAT SURFACES: 1
PHYSICAL TRANSFERS ASSESSED: 1
HOME_HEALTH_OASIS: 00

## 2024-11-01 ASSESSMENT — ENCOUNTER SYMPTOMS
LIMITED RANGE OF MOTION: 1
DENIES PAIN: 1

## 2024-11-04 ENCOUNTER — APPOINTMENT (OUTPATIENT)
Dept: PHYSICAL THERAPY | Facility: CLINIC | Age: 84
End: 2024-11-04
Payer: MEDICARE

## 2024-11-04 NOTE — PROGRESS NOTES
Physical Therapy Evaluation    Patient Name: Keisha Baxter  MRN: [id number]  Evaluation Date: 11/4/2024                  Problem List Items Addressed This Visit    None      Subjective   General:       Patient reported hx of injury: ***      Surgery:   R TKR 10/15/24    Precautions:   Above surgery    Relevant PMH:  ***    Red flags: Do you have any of the following?***  Fever/chills, unexplained weight changes, dizziness/fainting, unexplained change in bowel or bladder functions, unexplained malaise or muscle weakness, night pain/sweats, numbness or tingling    Pain:     Pain description: ***    Pain at present: ***/10  Maximum pain: ***/10  Minimum pain: ***/10    Pain improves with: ***  Pain worsens with: ***    Home Living:       Home type: {dkhousetype:63352}  Stairs: {yes,no:62862}  Lives with: {dkliveswith:84511}  Occupation: {Occupation:48831}  Work tasks: ***  Hobbies/activities: ***    Prior Function Per Pt/Caregiver Report:       Imaging:  ***    Objective   Posture:       Range of Motion:     Hip AROM L R   Flexion *** deg *** deg   Extension *** deg *** deg   Abduction *** deg *** deg   Adduction *** deg *** deg   External Rotation *** deg *** deg   Internal Rotation *** deg *** deg      Knee AROM L R   Flexion *** deg *** deg   Extension *** deg *** deg      Ankle AROM L R   Dorsiflexion *** deg *** deg   Plantarflexion *** deg *** deg   Eversion *** deg *** deg   Inversion *** deg *** deg      Strength:     LE MMT L R   Hip flexion ***/5 ***/5   Hip extension ***/5 ***/5   Hip abduction ***/5 ***/5   Hip adduction ***/5 ***/5   Hip external rotation ***/5 ***/5   Hip internal rotation ***/5 ***/5   Knee flexion ***/5 ***/5   Knee extension ***/5 ***/5   Ankle DF ***/5 ***/5   Ankle PF ***/5 ***/5   Ankle eversion ***/5 ***/5   Ankle inversion ***/5 ***/5      Flexibility:       Palpation:       Joint Assessment:      Special Tests:     ISABEL:  ***  FADIR:  ***  Scour:  ***  Vannessa: ***  Clau:  ***  Varus/Valgus stress test:  ***  Patellar Tracking:  ***  Drawer:  ***    Gait:       Balance:       Stairs:       Bed Mobility:       Transfers:       Other:       Outcome Measures:  {PT Outcome Measures:07754}     Assessment       Pt is a 84 y.o. female who presents with impairments of ***. These impairments have led to functional limitations including ***. Pt would benefit from skilled physical therapy intervention to improve above impairments and facilitate return to function.    Complexity of Evaluation: {dkevalcomplexity:80147}    Based on the history including personal factors and/or comorbidities, examination of body systems including body structures and function, activity limitations, and/or participation restrictions, as well as clinical presentation, patient meets criteria for a {dkevalcomplexity:60020} complexity evaluation.    Plan       Insurance Plan: Payor: MEDICARE / Plan: MEDICARE PART A AND B / Product Type: *No Product type* /     Plan for next visit: ***    OP EDUCATION:       Today's Treatment:  {Evaluation treatment options:55059}  HEP to be completed daily, exercises include:  ***    Goals:  Pt will be demonstrate increased ***  AROM so pt can ***  Pt will be able to *** with *** less pain so pt can ***  Pt will demonstrate increased strength in *** by > 1/2 MMT grade so pt can ***  Pt will be independent with HEP to allow pt to ***  Pt will ***     Pt's goal: ***

## 2024-11-06 ENCOUNTER — EVALUATION (OUTPATIENT)
Dept: PHYSICAL THERAPY | Facility: CLINIC | Age: 84
End: 2024-11-06
Payer: MEDICARE

## 2024-11-06 DIAGNOSIS — Z96.651 S/P TOTAL KNEE REPLACEMENT, RIGHT: ICD-10-CM

## 2024-11-06 DIAGNOSIS — Z96.651 PRESENCE OF RIGHT ARTIFICIAL KNEE JOINT: Chronic | ICD-10-CM

## 2024-11-06 PROCEDURE — 97162 PT EVAL MOD COMPLEX 30 MIN: CPT | Mod: GP

## 2024-11-06 ASSESSMENT — PAIN - FUNCTIONAL ASSESSMENT: PAIN_FUNCTIONAL_ASSESSMENT: 0-10

## 2024-11-06 ASSESSMENT — PAIN SCALES - GENERAL: PAINLEVEL_OUTOF10: 1

## 2024-11-06 NOTE — PROGRESS NOTES
"    Physical Therapy Evaluation    Patient Name: Keisha Baxter  MRN: [id number]  Evaluation Date: 11/6/2024  Time Calculation  Start Time: 1130  Stop Time: 1210  Time Calculation (min): 40 min  PT Evaluation Time Entry  PT Evaluation (Moderate) Time Entry: 40          Problem List Items Addressed This Visit             ICD-10-CM    Presence of right artificial knee joint Z96.651   S/P R TKA      Subjective   General:  General  Reason for Referral: R TKA  Referred By: Dr. Phillips  Past Medical History Relevant to Rehab: 83 y/o F s/p R TKA via Dr. Phillips on 10/15/24. Now 3 weeks post-op.  Report concern regarding h/o TIA  prior to surgery.  Still get dizzy and \"head rush\" symptoms.  Plans to see primary regarding symptoms.  General Comment: Anxious presentation    Patient reported hx of condition: H/O R knee OA    Surgery:   R TKA 10/15    Precautions:  Precautions  LE Weight Bearing Status: Weight Bearing as Tolerated  Post-Surgical Precautions: Right total knee precautions  Precautions Comment: Need reinforcement. Unabe to vocalize precautions, no kneeling/pivoting RLE    Relevant PMH:  H/O R knee OA, TIA, HTN, thyroid disorder    Red flags: NO    Pain:  Pain Assessment: 0-10  0-10 (Numeric) Pain Score: 1  Pain Type:  (Stiffness)  Home Living:  Home Living Comment: Assist from     Prior Function Per Pt/Caregiver Report:  Ambulatory Assistance: Independent (Mod I with cane LUE)    OBJECTIVE:  Objective   Posture:  Posture Comment: Gross knee alignment WNL  Range of Motion:  Range of Motion Comments: 6 to 100 degrees R knee flexion AROM in supine  Strength:  Strength Comments: 2+5 R hamstrings, 2-/5 R quad with poor quad set, + quad lag RLE  Flexibility:  Flexibility Comment: Tight R quad/hamsrings  Palpation:  Palpation Comment: Diffuse tenderness R knee  Special Tests:  Special Tests Comment: Deferred due to post op nature  Gait:  Gait Comment: Decreased stance time RLE, decreased pushed off RLE with shortened " step length LLE  Balance:  Balance Comment: Impaired SLS RLE < 5 seconds with B/L UE support  Stairs:  Stairs Comment: N/A  Bed Mobility:  Bed Mobility Comment: IND  Transfers:  Transfers Comment: IND  Other:  Comment: Incision intact.  No erythema, drainage.  Mild 1+ diane-incisional edema. Denies paraesthesias.      Outcome Measures:    39% impaired WOMAC    Assessment  PT Assessment Results: Decreased strength, Decreased range of motion, Impaired balance, Decreased endurance, Decreased mobility, Pain, Orthopedic restrictions  Rehab Prognosis: Good  Evaluation/Treatment Tolerance: Patient limited by fatigue  Barriers to Participation:  (Dizziness, anxiety)    Pt is a 84 y.o. female who presents with impairments of post-op R knee impaired AROM/strength/flexibility. These impairments have led to functional limitations including impaired mobility/gait/standing endurance. Pt would benefit from skilled physical therapy intervention to improve above impairments and facilitate return to function.    Complexity of Evaluation: Moderate    Based on the history including personal factors and/or comorbidities, examination of body systems including body structures and function, activity limitations, and/or participation restrictions, as well as clinical presentation, patient meets criteria for above complexity evaluation.    Plan  Treatment/Interventions: Aquatic therapy, Dry needling, Electrical stimulation, Gait training, Manual therapy, Taping techniques, Self care/ home management, Therapeutic activities, Therapeutic exercises, Ultrasound, Neuromuscular re-education  PT Plan: Skilled PT  PT Frequency: 2 times per week  Duration: 10-12 weeks  Onset Date: 11/06/24  Certification Period Start Date: 11/06/24  Number of Treatments Authorized: MN, re-evaluate visit 10  Rehab Potential: Good  Plan of Care Agreement: Patient    Insurance Plan: Payor: MEDICARE / Plan: MEDICARE PART A AND B / Product Type: *No Product type* /      Plan for next visit: R knee AROM/strengthening    OP EDUCATION:  Outpatient Education  Individual(s) Educated: Patient  Education Provided: POC  Risk and Benefits Discussed with Patient/Caregiver/Other: yes  Patient/Caregiver Demonstrated Understanding: yes  Plan of Care Discussed and Agreed Upon: yes  Patient Response to Education: Patient/Caregiver Verbalized Understanding of Information    Today's Treatment:  Evaluation and discussion only  HEP to be completed daily, exercises include:  Supine activity.  To progress and develop standing strengthening HEP.     Goals:  Active       PT goals       Short Term       Start:  11/06/24    Expected End:  12/21/24       STG 1: patient will be IND with standing post-operative strengthening HEP x 5-6 visits.         Long Term       Start:  11/06/24    Expected End:  02/04/25       LTG 1: Patient will demonstrate 0-140 degree R knee flexion AROM for improved flexibility during gait/mobility  LTG 2: Patient will demonstrate < 20% impairment on WOMAC scale  LTE 3: Patient will demonstrate 4/5 RLE post-op quadriceps/hamstring strength

## 2024-11-11 ENCOUNTER — APPOINTMENT (OUTPATIENT)
Dept: PHYSICAL THERAPY | Facility: CLINIC | Age: 84
End: 2024-11-11
Payer: MEDICARE

## 2024-11-11 ENCOUNTER — TELEPHONE (OUTPATIENT)
Dept: PRIMARY CARE | Facility: CLINIC | Age: 84
End: 2024-11-11
Payer: MEDICARE

## 2024-11-11 NOTE — TELEPHONE ENCOUNTER
Patient had knee replacement last month and she said she is very lightheaded all day and only a few spurts of where she can walk.    She said when she is walking around she feels as if she needs to sit down. She said on 10/03 she was told she had TIA and was placed on clopidogrel 75mg 9am and lisinopril 10mg 9pm. She said her bp has been in the 130s at home

## 2024-11-11 NOTE — TELEPHONE ENCOUNTER
Ruby Pillai, Can you please tell Keisha to drink fluids, make sure she's eating and getting plenty of rest. Her symptoms shouldn't be due to her blood pressure if her readings are in the 130s. If she continues to feel lightheaded, she should go to the emergency room. Please tell her I hope she starts to feel better! Thanks!

## 2024-11-15 ENCOUNTER — TREATMENT (OUTPATIENT)
Dept: PHYSICAL THERAPY | Facility: CLINIC | Age: 84
End: 2024-11-15
Payer: MEDICARE

## 2024-11-15 DIAGNOSIS — Z96.651 S/P TOTAL KNEE REPLACEMENT, RIGHT: ICD-10-CM

## 2024-11-15 PROCEDURE — 97110 THERAPEUTIC EXERCISES: CPT | Mod: GP | Performed by: PHYSICAL THERAPIST

## 2024-11-15 NOTE — PROGRESS NOTES
Physical Therapy Treatment    Patient Name: Keisha Baxter  MRN: [id number]  Encounter date: 11/15/2024    Time Calculation  Start Time: 0840  Stop Time: 0923  Time Calculation (min): 43 min  PT Therapeutic Procedures Time Entry  Therapeutic Exercise Time Entry: 40    Visit # 2 of 24  Visits/Dates Authorized: 10/30/2024: MEDICARE A/B, MMO SUPP,[place], NO AUTH ( $0 USED PT/ST )     Current Problem:   Problem List Items Addressed This Visit    None  Visit Diagnoses         Codes    S/P total knee replacement, right     Z96.651          Surgery:   Surgery date:     Precautions:           Subjective   General: Pt ambulated in to the clinic without device, slight decrease in knee extension in mid stance. Pt was a little shy about going into the gym area, but agreed to sci fit nu step.  Admits to not doing HEP due to feeling light headed since surgery. Ok today        Pre-Treatment Symptoms:        Objective   Findings:   Vitals:      Red rash/skin irritation on medial side of incision - she reports it has been itching and she has been scratching it through her pants. - recommended that she call the surgeon.        Treatments:      Therapeutic Exercise 81384:   Sci fit nu step L 2.5 x 8 min   Hi knees x15  Bum kicks x15  Side kicks x15  Heel raise x15  STS 10 x 2  LAQ x 20  QS and SLR x 15    Neuromuscular Re-Ed 35023:       Therapeutic Activity 18386:      Gait Training 58599:       Manual Therapy 38828:   STM and patellar mobs    Modalities:         HEP / Access Codes:   Access Code: [email or web address]  URL: [email or web address]  Date: 11/15/2024  Prepared by: Wilfrido Stone    Exercises  - Standing March with Counter Support  - 1 x daily - 7 x weekly - 3 sets - 10 reps  - Standing Knee Flexion with Counter Support  - 1 x daily - 7 x weekly - 3 sets - 10 reps  - Standing Hip Abduction with Counter Support  - 1 x daily - 7 x weekly - 3 sets - 10 reps  - Heel Raises with Counter Support  - 1 x daily - 7 x weekly - 3 sets - 10  reps  - quad set and straight leg combo  - 1 x daily - 7 x weekly - 3 sets - 10 reps  Assessment:    Pt admits to doing minimal HEP for the past few weeks, but is still doing very well with good flexion, extension, quad control and gait.  Recommended that doctor look at the skin reaction on her knee.      Post-Treatment Symptoms:      0/10  Plan:    Progress per TKR guidelines    Goals:   Active       PT goals       Short Term       Start:  11/06/24    Expected End:  12/21/24       STG 1: patient will be IND with standing post-operative strengthening HEP x 5-6 visits.         Long Term       Start:  11/06/24    Expected End:  02/04/25       LTG 1: Patient will demonstrate 0-140 degree R knee flexion AROM for improved flexibility during gait/mobility  LTG 2: Patient will demonstrate < 20% impairment on WOMAC scale  LTE 3: Patient will demonstrate 4/5 RLE post-op quadriceps/hamstring strength

## 2024-11-18 ENCOUNTER — TREATMENT (OUTPATIENT)
Dept: PHYSICAL THERAPY | Facility: CLINIC | Age: 84
End: 2024-11-18
Payer: MEDICARE

## 2024-11-18 DIAGNOSIS — Z96.651 S/P TOTAL KNEE REPLACEMENT, RIGHT: ICD-10-CM

## 2024-11-18 PROCEDURE — 97110 THERAPEUTIC EXERCISES: CPT | Mod: GP | Performed by: PHYSICAL THERAPIST

## 2024-11-18 NOTE — PROGRESS NOTES
Physical Therapy Treatment    Patient Name: Keisha Baxter  MRN: [id number]  Encounter date: 11/18/2024         Visit # 3 of 24  Visits/Dates Authorized: 10/30/2024: [place]IC[place] A/B, [place], NO AUTH ( $0 USED PT/ST )     Current Problem:   Problem List Items Addressed This Visit    None  Visit Diagnoses         Codes    S/P total knee replacement, right     Z96.651            Surgery: R TKR  Surgery date: 10/15/24     Precautions:           Subjective   General: Pt reports that she is feeling a little more light headed than last week, but wants to give it a shot. Knee is feeling pretty good.        Pre-Treatment Symptoms:        Objective   Findings:   Vitals:      Contacted doc about the rash, has not connected though.        Treatments:      Therapeutic Exercise 62809:   Sci fit nu step L 2.5 x 8 min   Rocker board A/P rock with and with UE support  Step tap 5inch x 15  Step up 5 inch 15 x 2  STS 10 x 2  Heel raise 10 x 2    Tball heel slides.  LAQ x 20  QS and SLR x 15    Neuromuscular Re-Ed 34751:       Therapeutic Activity 65513:      Gait Training 62042:       Manual Therapy 51487:   STM and patellar mobs DNP    Modalities:         HEP / Access Codes:   Access Code: [email or web address]  URL: [email or web address]  Date: 11/15/2024  Prepared by: Wilfrido Stone    Exercises  - Standing March with Counter Support  - 1 x daily - 7 x weekly - 3 sets - 10 reps  - Standing Knee Flexion with Counter Support  - 1 x daily - 7 x weekly - 3 sets - 10 reps  - Standing Hip Abduction with Counter Support  - 1 x daily - 7 x weekly - 3 sets - 10 reps  - Heel Raises with Counter Support  - 1 x daily - 7 x weekly - 3 sets - 10 reps  - quad set and straight leg combo  - 1 x daily - 7 x weekly - 3 sets - 10 reps  Assessment:    Pt tolerated advances well, was not impacted by c/o light headedness at start of session, no difficulty with there ex.     Post-Treatment Symptoms:      0/10  Plan:    Progress per TKR guidelines    Goals:    Active       PT goals       Short Term       Start:  11/06/24    Expected End:  12/21/24       STG 1: patient will be IND with standing post-operative strengthening HEP x 5-6 visits.         Long Term       Start:  11/06/24    Expected End:  02/04/25       LTG 1: Patient will demonstrate 0-140 degree R knee flexion AROM for improved flexibility during gait/mobility  LTG 2: Patient will demonstrate < 20% impairment on WOMAC scale  LTE 3: Patient will demonstrate 4/5 RLE post-op quadriceps/hamstring strength

## 2024-11-20 ENCOUNTER — TREATMENT (OUTPATIENT)
Dept: PHYSICAL THERAPY | Facility: CLINIC | Age: 84
End: 2024-11-20
Payer: MEDICARE

## 2024-11-20 DIAGNOSIS — Z96.651 S/P TOTAL KNEE REPLACEMENT, RIGHT: ICD-10-CM

## 2024-11-20 PROCEDURE — 97110 THERAPEUTIC EXERCISES: CPT | Mod: GP | Performed by: PHYSICAL THERAPIST

## 2024-11-20 NOTE — PROGRESS NOTES
Physical Therapy Treatment    Patient Name: Keisha Baxter  MRN: [id number]  Encounter date: 11/20/2024    Time Calculation  Start Time: 1245  Stop Time: 1330  Time Calculation (min): 45 min  PT Therapeutic Procedures Time Entry  Therapeutic Exercise Time Entry: 45    Visit # 4 of 24  Visits/Dates Authorized: 10/30/2024: MEDICARE A/B, [place], NO AUTH ( $0 USED PT/ST )     Current Problem:   Problem List Items Addressed This Visit    None  Visit Diagnoses         Codes    S/P total knee replacement, right     Z96.651              Surgery: R TKR  Surgery date: 10/15/24     Precautions:           Subjective   General: Pt reports pretty good today with more energy, and knee continues to feel better       Pre-Treatment Symptoms:        Objective   Findings:   Vitals:      Contacted doc about the rash, has not connected though.        Treatments:      Therapeutic Exercise 92642:   nu step L 5 x 8 min   Tband SS orange  20 feet x 3  STS 15 x 2  TKE orange x 20  Leg press DL 50 # 15 x 2, SL 20 # R 15 x 2  Heel raise 10 x 2  Tball heel slides.  QS and SLR x 15    DNP  Step tap 5inch x 15  Step up 5 inch 15 x 2  LAQ x 20    Neuromuscular Re-Ed 73097:       Therapeutic Activity 75958:      Gait Training 68299:       Manual Therapy 57052:   STM and patellar mobs DNP    Modalities:         HEP / Access Codes:   Access Code: [email or web address]  URL: [email or web address]  Date: 11/15/2024  Prepared by: Wilfrido Stone    Exercises  - Standing March with Counter Support  - 1 x daily - 7 x weekly - 3 sets - 10 reps  - Standing Knee Flexion with Counter Support  - 1 x daily - 7 x weekly - 3 sets - 10 reps  - Standing Hip Abduction with Counter Support  - 1 x daily - 7 x weekly - 3 sets - 10 reps  - Heel Raises with Counter Support  - 1 x daily - 7 x weekly - 3 sets - 10 reps  - quad set and straight leg combo  - 1 x daily - 7 x weekly - 3 sets - 10 reps  Assessment:    Pt with improved endurance/tolerance to activity. ROM and quad  control improving.     Post-Treatment Symptoms:      0/10  Plan:    Progress per TKR guidelines    Goals:   Active       PT goals       Short Term       Start:  11/06/24    Expected End:  12/21/24       STG 1: patient will be IND with standing post-operative strengthening HEP x 5-6 visits.         Long Term       Start:  11/06/24    Expected End:  02/04/25       LTG 1: Patient will demonstrate 0-140 degree R knee flexion AROM for improved flexibility during gait/mobility  LTG 2: Patient will demonstrate < 20% impairment on WOMAC scale  LTE 3: Patient will demonstrate 4/5 RLE post-op quadriceps/hamstring strength

## 2024-11-25 ENCOUNTER — LAB (OUTPATIENT)
Dept: LAB | Facility: LAB | Age: 84
End: 2024-11-25
Payer: MEDICARE

## 2024-11-25 ENCOUNTER — OFFICE VISIT (OUTPATIENT)
Dept: PRIMARY CARE | Facility: CLINIC | Age: 84
End: 2024-11-25
Payer: MEDICARE

## 2024-11-25 ENCOUNTER — PATIENT OUTREACH (OUTPATIENT)
Dept: PRIMARY CARE | Facility: CLINIC | Age: 84
End: 2024-11-25
Payer: MEDICARE

## 2024-11-25 VITALS
WEIGHT: 159 LBS | SYSTOLIC BLOOD PRESSURE: 124 MMHG | DIASTOLIC BLOOD PRESSURE: 76 MMHG | HEART RATE: 91 BPM | OXYGEN SATURATION: 99 % | BODY MASS INDEX: 26.49 KG/M2 | HEIGHT: 65 IN

## 2024-11-25 DIAGNOSIS — R39.9 UTI SYMPTOMS: ICD-10-CM

## 2024-11-25 DIAGNOSIS — R53.1 WEAKNESS: ICD-10-CM

## 2024-11-25 DIAGNOSIS — R53.83 OTHER FATIGUE: ICD-10-CM

## 2024-11-25 DIAGNOSIS — R53.1 WEAKNESS: Primary | ICD-10-CM

## 2024-11-25 DIAGNOSIS — D64.9 ANEMIA, UNSPECIFIED TYPE: ICD-10-CM

## 2024-11-25 DIAGNOSIS — E55.9 VITAMIN D DEFICIENCY: ICD-10-CM

## 2024-11-25 LAB
ALBUMIN SERPL BCP-MCNC: 4.2 G/DL (ref 3.4–5)
ALP SERPL-CCNC: 73 U/L (ref 33–136)
ALT SERPL W P-5'-P-CCNC: 13 U/L (ref 7–45)
ANION GAP SERPL CALC-SCNC: 11 MMOL/L (ref 10–20)
AST SERPL W P-5'-P-CCNC: 14 U/L (ref 9–39)
BASOPHILS # BLD AUTO: 0.02 X10*3/UL (ref 0–0.1)
BASOPHILS NFR BLD AUTO: 0.2 %
BILIRUB SERPL-MCNC: 0.4 MG/DL (ref 0–1.2)
BUN SERPL-MCNC: 11 MG/DL (ref 6–23)
CALCIUM SERPL-MCNC: 9.9 MG/DL (ref 8.6–10.6)
CHLORIDE SERPL-SCNC: 99 MMOL/L (ref 98–107)
CO2 SERPL-SCNC: 28 MMOL/L (ref 21–32)
CREAT SERPL-MCNC: 0.6 MG/DL (ref 0.5–1.05)
EGFRCR SERPLBLD CKD-EPI 2021: 89 ML/MIN/1.73M*2
EOSINOPHIL # BLD AUTO: 0.07 X10*3/UL (ref 0–0.4)
EOSINOPHIL NFR BLD AUTO: 0.7 %
ERYTHROCYTE [DISTWIDTH] IN BLOOD BY AUTOMATED COUNT: 13.5 % (ref 11.5–14.5)
GLUCOSE SERPL-MCNC: 84 MG/DL (ref 74–99)
HCT VFR BLD AUTO: 39.9 % (ref 36–46)
HGB BLD-MCNC: 12.9 G/DL (ref 12–16)
IMM GRANULOCYTES # BLD AUTO: 0.04 X10*3/UL (ref 0–0.5)
IMM GRANULOCYTES NFR BLD AUTO: 0.4 % (ref 0–0.9)
IRON SATN MFR SERPL: 9 % (ref 25–45)
IRON SERPL-MCNC: 27 UG/DL (ref 35–150)
LYMPHOCYTES # BLD AUTO: 0.97 X10*3/UL (ref 0.8–3)
LYMPHOCYTES NFR BLD AUTO: 9.9 %
MCH RBC QN AUTO: 31.2 PG (ref 26–34)
MCHC RBC AUTO-ENTMCNC: 32.3 G/DL (ref 32–36)
MCV RBC AUTO: 97 FL (ref 80–100)
MONOCYTES # BLD AUTO: 1.02 X10*3/UL (ref 0.05–0.8)
MONOCYTES NFR BLD AUTO: 10.4 %
NEUTROPHILS # BLD AUTO: 7.65 X10*3/UL (ref 1.6–5.5)
NEUTROPHILS NFR BLD AUTO: 78.4 %
NRBC BLD-RTO: 0 /100 WBCS (ref 0–0)
PLATELET # BLD AUTO: 201 X10*3/UL (ref 150–450)
POC APPEARANCE, URINE: ABNORMAL
POC BILIRUBIN, URINE: NEGATIVE
POC BLOOD, URINE: ABNORMAL
POC COLOR, URINE: YELLOW
POC GLUCOSE, URINE: NEGATIVE MG/DL
POC KETONES, URINE: NEGATIVE MG/DL
POC LEUKOCYTES, URINE: ABNORMAL
POC NITRITE,URINE: POSITIVE
POC PH, URINE: 6 PH
POC PROTEIN, URINE: ABNORMAL MG/DL
POC SPECIFIC GRAVITY, URINE: 1.02
POC UROBILINOGEN, URINE: 0.2 EU/DL
POTASSIUM SERPL-SCNC: 4.2 MMOL/L (ref 3.5–5.3)
PROT SERPL-MCNC: 6.6 G/DL (ref 6.4–8.2)
RBC # BLD AUTO: 4.13 X10*6/UL (ref 4–5.2)
SODIUM SERPL-SCNC: 134 MMOL/L (ref 136–145)
TIBC SERPL-MCNC: 294 UG/DL (ref 240–445)
TSH SERPL-ACNC: 3.2 MIU/L (ref 0.44–3.98)
UIBC SERPL-MCNC: 267 UG/DL (ref 110–370)
WBC # BLD AUTO: 9.8 X10*3/UL (ref 4.4–11.3)

## 2024-11-25 PROCEDURE — 1123F ACP DISCUSS/DSCN MKR DOCD: CPT | Performed by: STUDENT IN AN ORGANIZED HEALTH CARE EDUCATION/TRAINING PROGRAM

## 2024-11-25 PROCEDURE — 99214 OFFICE O/P EST MOD 30 MIN: CPT | Performed by: STUDENT IN AN ORGANIZED HEALTH CARE EDUCATION/TRAINING PROGRAM

## 2024-11-25 PROCEDURE — 87086 URINE CULTURE/COLONY COUNT: CPT

## 2024-11-25 PROCEDURE — 1157F ADVNC CARE PLAN IN RCRD: CPT | Performed by: STUDENT IN AN ORGANIZED HEALTH CARE EDUCATION/TRAINING PROGRAM

## 2024-11-25 PROCEDURE — 1159F MED LIST DOCD IN RCRD: CPT | Performed by: STUDENT IN AN ORGANIZED HEALTH CARE EDUCATION/TRAINING PROGRAM

## 2024-11-25 PROCEDURE — 80053 COMPREHEN METABOLIC PANEL: CPT

## 2024-11-25 PROCEDURE — 83540 ASSAY OF IRON: CPT

## 2024-11-25 PROCEDURE — 1126F AMNT PAIN NOTED NONE PRSNT: CPT | Performed by: STUDENT IN AN ORGANIZED HEALTH CARE EDUCATION/TRAINING PROGRAM

## 2024-11-25 PROCEDURE — 82306 VITAMIN D 25 HYDROXY: CPT

## 2024-11-25 PROCEDURE — 84443 ASSAY THYROID STIM HORMONE: CPT

## 2024-11-25 PROCEDURE — 87186 SC STD MICRODIL/AGAR DIL: CPT

## 2024-11-25 PROCEDURE — 36415 COLL VENOUS BLD VENIPUNCTURE: CPT

## 2024-11-25 PROCEDURE — 81003 URINALYSIS AUTO W/O SCOPE: CPT | Mod: QW | Performed by: STUDENT IN AN ORGANIZED HEALTH CARE EDUCATION/TRAINING PROGRAM

## 2024-11-25 PROCEDURE — 83550 IRON BINDING TEST: CPT

## 2024-11-25 PROCEDURE — 85025 COMPLETE CBC W/AUTO DIFF WBC: CPT

## 2024-11-25 RX ORDER — NITROFURANTOIN 25; 75 MG/1; MG/1
100 CAPSULE ORAL 2 TIMES DAILY
Qty: 10 CAPSULE | Refills: 0 | Status: SHIPPED | OUTPATIENT
Start: 2024-11-25 | End: 2024-11-30

## 2024-11-25 ASSESSMENT — PATIENT HEALTH QUESTIONNAIRE - PHQ9
2. FEELING DOWN, DEPRESSED OR HOPELESS: NOT AT ALL
1. LITTLE INTEREST OR PLEASURE IN DOING THINGS: NOT AT ALL
SUM OF ALL RESPONSES TO PHQ9 QUESTIONS 1 AND 2: 0

## 2024-11-25 ASSESSMENT — PAIN SCALES - GENERAL: PAINLEVEL_OUTOF10: 0-NO PAIN

## 2024-11-25 NOTE — PROGRESS NOTES
"Subjective   Patient ID: Keisha Baxter is a 84 y.o. female who presents for Follow-up (BP check up), weakness, and UTI (Pressure, frequency).    HPI  85 yo female here for BP follow up  HTN  Well controlled today  120s/130s at home  2. Weakness  Felt lightheaded and dizzy in hospital  Tired, legs tired since her knee surgery  Sometimes feels too tired for PT  3. UTI symptoms  Pressure, frequency    Review of Systems  All pertinent positive symptoms are included in the history of present illness.    All other systems have been reviewed and are negative and noncontributory to this patient's current ailments.     Allergies   Allergen Reactions    Penicillins Hives and Unknown     hives    Shellfish Derived Hives        Immunization History   Administered Date(s) Administered    Pfizer COVID-19 vaccine, bivalent, age 12 years and older (30 mcg/0.3 mL) 09/19/2022    Pfizer Purple Cap SARS-CoV-2 01/29/2021, 02/27/2021, 10/05/2021       Objective   Vitals:    11/25/24 1256   BP: 124/76   Pulse: 91   SpO2: 99%   Weight: 72.1 kg (159 lb)   Height: 1.651 m (5' 5\")       Physical Exam  CONSTITUTIONAL - well nourished, well developed, looks like stated age, in no acute distress  SKIN - normal skin color and pigmentation. No rash, lesions, or nodules visualized  HEAD - no trauma, normocephalic  EYES - extraocular muscles are intact  ENT - atraumatic  NECK - no neck mass was observed  LUNGS - CTA B/L  CARDIAC - regular rate and regular rhythm  ABDOMEN - no organomegaly, soft, nontender, nondistended  EXTREMITIES - no edema, no deformities  MSK - moves limbs equally  NEUROLOGICAL - alert, oriented and no focal signs  PSYCHIATRIC - alert, pleasant and cordial, age-appropriate  IMMUNOLOGIC - no cervical lymphadenopathy     Assessment/Plan   85 yo female here for BP follow up  HTN  Continue lisinopril 10 mg  2. Weakness  Labs  3. UTI symptoms  Ua positive for nitrites and small LE, send for culture  Start Macrobid    RTC prn  "

## 2024-11-26 ENCOUNTER — TELEPHONE (OUTPATIENT)
Dept: PRIMARY CARE | Facility: CLINIC | Age: 84
End: 2024-11-26
Payer: MEDICARE

## 2024-11-26 DIAGNOSIS — E55.9 VITAMIN D DEFICIENCY: ICD-10-CM

## 2024-11-26 DIAGNOSIS — E61.1 IRON DEFICIENCY: Primary | ICD-10-CM

## 2024-11-26 DIAGNOSIS — E55.9 VITAMIN D DEFICIENCY: Primary | ICD-10-CM

## 2024-11-26 LAB — 25(OH)D3 SERPL-MCNC: 21 NG/ML (ref 30–100)

## 2024-11-26 RX ORDER — CALCIUM CARB/VITAMIN D3/VIT K1 500-500-40
TABLET,CHEWABLE ORAL
Qty: 180 CAPSULE | Refills: 1 | Status: SHIPPED | OUTPATIENT
Start: 2024-11-26

## 2024-11-26 RX ORDER — FERROUS SULFATE 324(65)MG
65 TABLET, DELAYED RELEASE (ENTERIC COATED) ORAL EVERY OTHER DAY
Qty: 45 TABLET | Refills: 1 | Status: SHIPPED | OUTPATIENT
Start: 2024-11-26 | End: 2025-05-25

## 2024-11-27 ENCOUNTER — TREATMENT (OUTPATIENT)
Dept: PHYSICAL THERAPY | Facility: CLINIC | Age: 84
End: 2024-11-27
Payer: MEDICARE

## 2024-11-27 DIAGNOSIS — Z96.651 S/P TOTAL KNEE REPLACEMENT, RIGHT: ICD-10-CM

## 2024-11-27 LAB — BACTERIA UR CULT: ABNORMAL

## 2024-11-27 PROCEDURE — 97110 THERAPEUTIC EXERCISES: CPT | Mod: GP

## 2024-11-27 NOTE — PROGRESS NOTES
Physical Therapy Treatment     Patient Name: Keisha Baxter  MRN: [id number]  Encounter date: 11/27/2024    Time Calculation  Start Time: 1010  Stop Time: 1050  Time Calculation (min): 40 min  PT Therapeutic Procedures Time Entry  Therapeutic Exercise Time Entry: 40     Visit # 5/10  Visits/Dates Authorized: 10/30/2024: MEDICARE A/B, [place], NO AUTH ( $0 USED PT/ST )   *Re-assess visit 10 and again visit 20     Current Problem:   Problem List Items Addressed This Visit    None  Visit Diagnoses           Codes     S/P total knee replacement, right     Z96.651                   Surgery: R TKR  Surgery date: 10/15/24               Precautions:    Post-op knee     Subjective  Doing well.  Denies falls.  Feel knee is progressing but has limited energy.  Reports she had labs with low vitamin D, and low iron.      Objective  Findings:   Guarded gait, decreased B/L LE step length.      Treatments:      Therapeutic Exercise 11961:   nu step L 5 x 6 min   Tband SS Min x 20 step to L, 20 to R   STS x 15  TKE Min  Leg press DL 20# x 20, 40# x 15  Heel raise 10 x 2  Tball SL heel slides x 20 B/L  QS and SLR x 15 B/L     Cues to stay on task, hyper-verbal, pacing required     HEP / Access Codes:   Access Code: [email or web address]  URL: [email or web address]  Date: 11/15/2024  Prepared by: Wilfrido Stone     Exercises  - Standing March with Counter Support  - 1 x daily - 7 x weekly - 3 sets - 10 reps  - Standing Knee Flexion with Counter Support  - 1 x daily - 7 x weekly - 3 sets - 10 reps  - Standing Hip Abduction with Counter Support  - 1 x daily - 7 x weekly - 3 sets - 10 reps  - Heel Raises with Counter Support  - 1 x daily - 7 x weekly - 3 sets - 10 reps  - quad set and straight leg combo  - 1 x daily - 7 x weekly - 3 sets - 10 reps    Assessment:   Demonstrates slow, steady progress.  Cues to stay on task and for rep count.  Improved LE strength and overall endurance with tolerance to activity.      Post-Treatment Symptoms:    0/10    Plan:    Progress per TKR guidelines     Goals:   Active         PT goals         Short Term         Start:  11/06/24    Expected End:  12/21/24        STG 1: patient will be IND with standing post-operative strengthening HEP x 5-6 visits.           Long Term         Start:  11/06/24    Expected End:  02/04/25        LTG 1: Patient will demonstrate 0-140 degree R knee flexion AROM for improved flexibility during gait/mobility  LTG 2: Patient will demonstrate < 20% impairment on WOMAC scale  LTE 3: Patient will demonstrate 4/5 RLE post-op quadriceps/hamstring strength

## 2024-12-02 ENCOUNTER — TREATMENT (OUTPATIENT)
Dept: PHYSICAL THERAPY | Facility: CLINIC | Age: 84
End: 2024-12-02
Payer: MEDICARE

## 2024-12-02 DIAGNOSIS — Z96.651 S/P TOTAL KNEE REPLACEMENT, RIGHT: ICD-10-CM

## 2024-12-02 PROCEDURE — 97110 THERAPEUTIC EXERCISES: CPT | Mod: GP | Performed by: PHYSICAL THERAPIST

## 2024-12-02 NOTE — PROGRESS NOTES
Physical Therapy Treatment     Patient Name: Keisha Baxter  MRN: [id number]  Encounter date: 11/27/2024    Time Calculation  Start Time: 1010  Stop Time: 1050  Time Calculation (min): 40 min  PT Therapeutic Procedures Time Entry  Therapeutic Exercise Time Entry: 40     Visit # 5/10  Visits/Dates Authorized: 10/30/2024: MEDICARE A/B, [place], NO AUTH ( $0 USED PT/ST )   *Re-assess visit 10 and again visit 20     Current Problem:   Problem List Items Addressed This Visit    None  Visit Diagnoses           Codes     S/P total knee replacement, right     Z96.651                   Surgery: R TKR  Surgery date: 10/15/24               Precautions:    Post-op knee     Subjective  Doing well, knee is feeling pretty good.      Objective  Findings:   Guarded gait, decreased B/L LE step length.      Treatments:      Therapeutic Exercise 18100:   nu step L 5 x 8 min   Tband SS Min x 20 step to L, 20 to R   STS x 15  TKE Min  Heel Raise x 20  Leg press DL 20# x 20, 40# x 15 SL R 10# 10x 2  Heel raise 10 x 2  Tball SL heel slides x 20 B/L  Tball heel dig ham isos 20 x  QS and SLR 2.5# x 20x FR   hip add   Hip abd   bridge  Cues to stay on task, hyper-verbal, pacing required     HEP / Access Codes:   Access Code: [email or web address]  URL: [email or web address]  Date: 11/15/2024  Prepared by: Wilfrido Stone     Exercises  - Standing March with Counter Support  - 1 x daily - 7 x weekly - 3 sets - 10 reps  - Standing Knee Flexion with Counter Support  - 1 x daily - 7 x weekly - 3 sets - 10 reps  - Standing Hip Abduction with Counter Support  - 1 x daily - 7 x weekly - 3 sets - 10 reps  - Heel Raises with Counter Support  - 1 x daily - 7 x weekly - 3 sets - 10 reps  - quad set and straight leg combo  - 1 x daily - 7 x weekly - 3 sets - 10 reps    Assessment:   Demonstrates slow, steady progress.  Cues to stay on task and for rep count.  Improved LE strength and overall endurance with tolerance to activity.      Post-Treatment Symptoms:    0/10    Plan:    Progress per TKR guidelines     Goals:   Active         PT goals         Short Term         Start:  11/06/24    Expected End:  12/21/24        STG 1: patient will be IND with standing post-operative strengthening HEP x 5-6 visits.           Long Term         Start:  11/06/24    Expected End:  02/04/25        LTG 1: Patient will demonstrate 0-140 degree R knee flexion AROM for improved flexibility during gait/mobility  LTG 2: Patient will demonstrate < 20% impairment on WOMAC scale  LTE 3: Patient will demonstrate 4/5 RLE post-op quadriceps/hamstring strength

## 2024-12-04 ENCOUNTER — TREATMENT (OUTPATIENT)
Dept: PHYSICAL THERAPY | Facility: CLINIC | Age: 84
End: 2024-12-04
Payer: MEDICARE

## 2024-12-04 DIAGNOSIS — Z96.651 S/P TOTAL KNEE REPLACEMENT, RIGHT: ICD-10-CM

## 2024-12-04 PROCEDURE — 97110 THERAPEUTIC EXERCISES: CPT | Mod: GP | Performed by: PHYSICAL THERAPIST

## 2024-12-04 NOTE — PROGRESS NOTES
Physical Therapy Treatment     Patient Name: Keisha Baxter  MRN: [id number]  Encounter date: 11/27/2024    Time Calculation  Start Time: 1010  Stop Time: 1050  Time Calculation (min): 40 min  PT Therapeutic Procedures Time Entry  Therapeutic Exercise Time Entry: 40     Visit # 5/10  Visits/Dates Authorized: 10/30/2024: MEDICARE A/B, [place], NO AUTH ( $0 USED PT/ST )   *Re-assess visit 10 and again visit 20     Current Problem:   Problem List Items Addressed This Visit    None  Visit Diagnoses           Codes     S/P total knee replacement, right     Z96.651                   Surgery: R TKR  Surgery date: 10/15/24               Precautions:    Post-op knee     Subjective  Doing well, knee is feeling pretty good. Has more energy today.      Objective  Findings:   Guarded gait, decreased B/L LE step length.      Treatments:      Therapeutic Exercise 39656:   nu step L 5 x 8 min   Tband SS Min x 20 step to L, 20 to R   STS x 15  TKE Min  Heel Raise x 20  Leg press DL 20# x 20, 40# x 15 SL R 10# 10x 2  Leg extension DL 15# 20, SL R 5#10x 2  Leg curl DL 25# 15 x 2  Heel raise 10 x 2  Tball SL heel slides x 20 B/L  Tball heel dig ham isos 20 x  QS and SLR 2.5# x 20x FR   hip add   Hip abd   bridge  Cues to stay on task, hyper-verbal, pacing required     HEP / Access Codes:   Access Code: [email or web address]  URL: [email or web address]  Date: 11/15/2024  Prepared by: Wilfrido Stone     Exercises  - Standing March with Counter Support  - 1 x daily - 7 x weekly - 3 sets - 10 reps  - Standing Knee Flexion with Counter Support  - 1 x daily - 7 x weekly - 3 sets - 10 reps  - Standing Hip Abduction with Counter Support  - 1 x daily - 7 x weekly - 3 sets - 10 reps  - Heel Raises with Counter Support  - 1 x daily - 7 x weekly - 3 sets - 10 reps  - quad set and straight leg combo  - 1 x daily - 7 x weekly - 3 sets - 10 reps    Assessment:   Tolerated wt machines well. ROM, pain and strength all progressing      Post-Treatment  Symptoms:   0/10    Plan:    Progress per TKR guidelines     Goals:   Active         PT goals         Short Term         Start:  11/06/24    Expected End:  12/21/24        STG 1: patient will be IND with standing post-operative strengthening HEP x 5-6 visits.           Long Term         Start:  11/06/24    Expected End:  02/04/25        LTG 1: Patient will demonstrate 0-140 degree R knee flexion AROM for improved flexibility during gait/mobility  LTG 2: Patient will demonstrate < 20% impairment on WOMAC scale  LTE 3: Patient will demonstrate 4/5 RLE post-op quadriceps/hamstring strength

## 2024-12-09 ENCOUNTER — TREATMENT (OUTPATIENT)
Dept: PHYSICAL THERAPY | Facility: CLINIC | Age: 84
End: 2024-12-09
Payer: MEDICARE

## 2024-12-09 DIAGNOSIS — Z96.651 S/P TOTAL KNEE REPLACEMENT, RIGHT: ICD-10-CM

## 2024-12-09 PROCEDURE — 97110 THERAPEUTIC EXERCISES: CPT | Mod: GP | Performed by: PHYSICAL THERAPIST

## 2024-12-09 NOTE — PROGRESS NOTES
Physical Therapy Treatment    Patient Name: Keisha Baxter  MRN: [id number]  Encounter date: 12/9/2024    Time Calculation  Start Time: 1248  Stop Time: 1330  Time Calculation (min): 42 min  PT Therapeutic Procedures Time Entry  Therapeutic Exercise Time Entry: 40    Visit # 8 of 10  Visits/Dates Authorized: [place]    Current Problem:   Problem List Items Addressed This Visit    None  Visit Diagnoses         Codes    S/P total knee replacement, right     Z96.651          Surgery: R TKR  Surgery date: 10/15/24    Precautions:    Falls       Subjective   General:    Cotninues to report more energy for daily things. Knee is feeling well day    Pre-Treatment Symptoms:    2    Objective   Findings:   Vitals:             Treatments:      Therapeutic Exercise 34379:     nu step L 5 x 8 min   Tband SS Min x 20 step to L, 20 to R   STS x 15  TKE Min  Heel Raise x 20  Leg press DL 20# x 20, 40# x 15 SL R 10# 10x 2  Leg extension DL 15# 20, SL R 5#10x 2  Heel digs for ham iso on tball x 20  TKE blue x 20  Heel raise 10 x 2 with TKE  Tball SL heel slides x 20 B/L  Tball heel dig ham isos 20 x  QS and SLR 2.5# x 20x FR   hip add   Hip abd   bridge  Cues to stay on task, hyper-verbal, pacing required        HEP / Access Codes:     Assessment: Pt progressing very well with ROM, quad control, pain and gait.        Post-Treatment Symptoms:        Plan: Continue per POC       Goals:   Active       PT goals       Short Term       Start:  11/06/24    Expected End:  12/21/24       STG 1: patient will be IND with standing post-operative strengthening HEP x 5-6 visits.         Long Term       Start:  11/06/24    Expected End:  02/04/25       LTG 1: Patient will demonstrate 0-140 degree R knee flexion AROM for improved flexibility during gait/mobility  LTG 2: Patient will demonstrate < 20% impairment on WOMAC scale  LTE 3: Patient will demonstrate 4/5 RLE post-op quadriceps/hamstring strength

## 2024-12-11 ENCOUNTER — TELEPHONE (OUTPATIENT)
Dept: PRIMARY CARE | Facility: CLINIC | Age: 84
End: 2024-12-11
Payer: MEDICARE

## 2024-12-11 ENCOUNTER — TREATMENT (OUTPATIENT)
Dept: PHYSICAL THERAPY | Facility: CLINIC | Age: 84
End: 2024-12-11
Payer: MEDICARE

## 2024-12-11 DIAGNOSIS — E61.1 IRON DEFICIENCY: Primary | ICD-10-CM

## 2024-12-11 DIAGNOSIS — Z96.651 S/P TOTAL KNEE REPLACEMENT, RIGHT: ICD-10-CM

## 2024-12-11 PROCEDURE — 97110 THERAPEUTIC EXERCISES: CPT | Mod: GP

## 2024-12-11 NOTE — TELEPHONE ENCOUNTER
Pt called to update she has been taking her Vit D & iron supplements for x2 weeks now     >She states she feels better - no longer light-headed    >She will continue take Vit D regularly   >She would like to know how long she should be on the iron supplement - Please Advise     >Pt OK to wait for Dr. Frey's return

## 2024-12-11 NOTE — PROGRESS NOTES
"  Physical Therapy Treatment/PT discharge     Patient Name: Keisha Bxater  MRN: [id number]  Encounter date: 12/11/2024    Time Calculation  Start Time: 1205  Stop Time: 1245  Time Calculation (min): 40 min  PT Therapeutic Procedures Time Entry  Therapeutic Exercise Time Entry: 38     Visit # 9 of 10  Visits/Dates Authorized: [place]     Current Problem:   Problem List Items Addressed This Visit    None  Visit Diagnoses           Codes     S/P total knee replacement, right     Z96.651             Surgery: R TKR  Surgery date: 10/15/24     Precautions:    Falls        Subjective  General:   No new issues.  Last scheduled visit.  Discussed continued treatment option vs. Discharge.  Patient feels she is ready to discharge at this time. IND with HEP.  Functionally IND.      Pre-Treatment Symptoms:   1/10 R knee \"soreness\"        Objective  Decreased stance time RLE  R knee AROM flexion to 120 degrees  L quad/HS strength 3+/5     Treatments:      Therapeutic Exercise 27328:      nu step L 5 x 8 min   Tband SS Min x 20 step to L, 20 to R   STS x 15  TKE Min  Heel Raise x 20  Leg press DL 20# x 20, 40# x 15 SL R 10# 10x 2  Leg extension DL 15# 20, SL R 5#10x 2  Heel digs for ham iso on tball x 20  TKE blue x 20  Heel raise 10 x 2 with TKE  Tball SL heel slides x 20 B/L  Tball heel dig ham isos 20 x  QS and SLR 2.5# x 20x FR   hip add DNP   Hip abd DNP   Bridge x 20    Cues to stay on task, hyper-verbal, pacing required     HEP / Access Codes:      Assessment: Progressing appropriately.  Reports less stiffness with exercise. Goals adequate for discharge.  Patient to continue strengthening IND on own.      Post-Treatment Symptoms: \"Feel good\"     Plan: Continue per POC     Goals:   Active         PT goals         Short Term         Start:  11/06/24    Expected End:  12/21/24        STG 1: patient will be IND with standing post-operative strengthening HEP x 5-6 visits.           Long Term         Start:  11/06/24    Expected End:  " 02/04/25        LTG 1: Patient will demonstrate 0-140 degree R knee flexion AROM for improved flexibility during gait/mobility  LTG 2: Patient will demonstrate < 20% impairment on WOMAC scale  LTE 3: Patient will demonstrate 4/5 RLE post-op quadriceps/hamstring strength

## 2024-12-11 NOTE — TELEPHONE ENCOUNTER
Spoke to patient regarding iron supplement.  She verbally understood.  She will repeat blood work in 3 months.

## 2024-12-31 ENCOUNTER — PATIENT OUTREACH (OUTPATIENT)
Dept: PRIMARY CARE | Facility: CLINIC | Age: 84
End: 2024-12-31
Payer: MEDICARE

## 2025-01-13 ENCOUNTER — OFFICE VISIT (OUTPATIENT)
Dept: PRIMARY CARE | Facility: CLINIC | Age: 85
End: 2025-01-13
Payer: MEDICARE

## 2025-01-13 VITALS
DIASTOLIC BLOOD PRESSURE: 74 MMHG | HEIGHT: 65 IN | OXYGEN SATURATION: 99 % | HEART RATE: 77 BPM | SYSTOLIC BLOOD PRESSURE: 124 MMHG | WEIGHT: 155.6 LBS | BODY MASS INDEX: 25.92 KG/M2

## 2025-01-13 DIAGNOSIS — E61.1 IRON DEFICIENCY: Primary | ICD-10-CM

## 2025-01-13 DIAGNOSIS — E03.9 HYPOTHYROIDISM, UNSPECIFIED TYPE: ICD-10-CM

## 2025-01-13 DIAGNOSIS — N32.81 OVERACTIVE BLADDER: ICD-10-CM

## 2025-01-13 DIAGNOSIS — G45.9 TIA (TRANSIENT ISCHEMIC ATTACK): ICD-10-CM

## 2025-01-13 DIAGNOSIS — I16.1 HYPERTENSIVE EMERGENCY: ICD-10-CM

## 2025-01-13 DIAGNOSIS — I10 PRIMARY HYPERTENSION: ICD-10-CM

## 2025-01-13 DIAGNOSIS — E78.2 MIXED HYPERLIPIDEMIA: ICD-10-CM

## 2025-01-13 PROCEDURE — 1157F ADVNC CARE PLAN IN RCRD: CPT | Performed by: STUDENT IN AN ORGANIZED HEALTH CARE EDUCATION/TRAINING PROGRAM

## 2025-01-13 PROCEDURE — 99214 OFFICE O/P EST MOD 30 MIN: CPT | Performed by: STUDENT IN AN ORGANIZED HEALTH CARE EDUCATION/TRAINING PROGRAM

## 2025-01-13 PROCEDURE — 3074F SYST BP LT 130 MM HG: CPT | Performed by: STUDENT IN AN ORGANIZED HEALTH CARE EDUCATION/TRAINING PROGRAM

## 2025-01-13 PROCEDURE — 1123F ACP DISCUSS/DSCN MKR DOCD: CPT | Performed by: STUDENT IN AN ORGANIZED HEALTH CARE EDUCATION/TRAINING PROGRAM

## 2025-01-13 PROCEDURE — 1036F TOBACCO NON-USER: CPT | Performed by: STUDENT IN AN ORGANIZED HEALTH CARE EDUCATION/TRAINING PROGRAM

## 2025-01-13 PROCEDURE — 3078F DIAST BP <80 MM HG: CPT | Performed by: STUDENT IN AN ORGANIZED HEALTH CARE EDUCATION/TRAINING PROGRAM

## 2025-01-13 PROCEDURE — 1159F MED LIST DOCD IN RCRD: CPT | Performed by: STUDENT IN AN ORGANIZED HEALTH CARE EDUCATION/TRAINING PROGRAM

## 2025-01-13 PROCEDURE — 1126F AMNT PAIN NOTED NONE PRSNT: CPT | Performed by: STUDENT IN AN ORGANIZED HEALTH CARE EDUCATION/TRAINING PROGRAM

## 2025-01-13 RX ORDER — SIMVASTATIN 40 MG/1
40 TABLET, FILM COATED ORAL NIGHTLY
Qty: 90 TABLET | Refills: 1 | Status: SHIPPED | OUTPATIENT
Start: 2025-01-13

## 2025-01-13 RX ORDER — LISINOPRIL 10 MG/1
10 TABLET ORAL DAILY
Qty: 90 TABLET | Refills: 1 | Status: SHIPPED | OUTPATIENT
Start: 2025-01-13 | End: 2025-07-12

## 2025-01-13 RX ORDER — LEVOTHYROXINE SODIUM 100 UG/1
100 TABLET ORAL
Qty: 90 TABLET | Refills: 1 | Status: SHIPPED | OUTPATIENT
Start: 2025-01-13

## 2025-01-13 RX ORDER — CLOPIDOGREL BISULFATE 75 MG/1
75 TABLET ORAL DAILY
Qty: 90 TABLET | Refills: 1 | Status: SHIPPED | OUTPATIENT
Start: 2025-01-13 | End: 2025-07-12

## 2025-01-13 ASSESSMENT — PATIENT HEALTH QUESTIONNAIRE - PHQ9
SUM OF ALL RESPONSES TO PHQ9 QUESTIONS 1 AND 2: 0
1. LITTLE INTEREST OR PLEASURE IN DOING THINGS: NOT AT ALL
2. FEELING DOWN, DEPRESSED OR HOPELESS: NOT AT ALL

## 2025-01-13 ASSESSMENT — PAIN SCALES - GENERAL: PAINLEVEL_OUTOF10: 0-NO PAIN

## 2025-01-13 NOTE — PROGRESS NOTES
"Subjective   Patient ID: Keisha Baxter is a 84 y.o. female who presents for Follow-up (6 month follow up).    HPI  85 yo female here for 6 month follow up  Hypothyroidism  On levothyroxine 100 mcg  2. HLD  On simvastatin 40 mg   3. Overactive bladder  Seeing new urologist  On desmopressin .2mg daily  4. HTN  On lisinopril 10 mg  5. Iron deficiency  On ferrous sulfate every other day  6. HX TIA  On plavix 75 mg daily    Review of Systems  All pertinent positive symptoms are included in the history of present illness.    All other systems have been reviewed and are negative and noncontributory to this patient's current ailments.     Allergies   Allergen Reactions    Penicillins Hives and Unknown     hives    Shellfish Derived Hives        Immunization History   Administered Date(s) Administered    Pfizer COVID-19 vaccine, bivalent, age 12 years and older (30 mcg/0.3 mL) 09/19/2022    Pfizer Purple Cap SARS-CoV-2 01/29/2021, 02/27/2021, 10/05/2021       Objective   Vitals:    01/13/25 0928   BP: 124/74   Pulse: 77   SpO2: 99%   Weight: 70.6 kg (155 lb 9.6 oz)   Height: 1.651 m (5' 5\")       Physical Exam  CONSTITUTIONAL - well nourished, well developed, looks like stated age, in no acute distress  SKIN - normal skin color and pigmentation. No rash, lesions, or nodules visualized  HEAD - no trauma, normocephalic  EYES - extraocular muscles are intact  ENT - atraumatic  NECK - no neck mass was observed  LUNGS - CTA B/L  CARDIAC - regular rate and regular rhythm  ABDOMEN - no organomegaly, soft, nontender, nondistended  EXTREMITIES - no edema, no deformities  MSK - moves limbs equally  NEUROLOGICAL - alert, oriented and no focal signs  PSYCHIATRIC - alert, pleasant and cordial, age-appropriate  IMMUNOLOGIC - no cervical lymphadenopathy     Assessment/Plan   85 yo female here for 6 month follow up  Hypothyroidism  Continue levothyroxine 100 mcg  2. HLD  Continue simvastatin 40 mg   3. Overactive bladder  Seeing new " urologist  Continue desmopressin .2mg daily  4. HTN  Continue lisinopril 10 mg  5. Iron deficiency  Continue ferrous sulfate every other day  6. HX TIA  Continue plavix 75 mg daily    Check labs in 1 month, decide whether or not to continue iron supplement  RTC for MW in 6 months

## 2025-02-26 LAB
ALBUMIN SERPL-MCNC: 4.3 G/DL (ref 3.6–5.1)
ALP SERPL-CCNC: 65 U/L (ref 37–153)
ALT SERPL-CCNC: 11 U/L (ref 6–29)
ANION GAP SERPL CALCULATED.4IONS-SCNC: 11 MMOL/L (CALC) (ref 7–17)
AST SERPL-CCNC: 15 U/L (ref 10–35)
BASOPHILS # BLD AUTO: 40 CELLS/UL (ref 0–200)
BASOPHILS NFR BLD AUTO: 0.8 %
BILIRUB SERPL-MCNC: 0.5 MG/DL (ref 0.2–1.2)
BUN SERPL-MCNC: 17 MG/DL (ref 7–25)
CALCIUM SERPL-MCNC: 9.4 MG/DL (ref 8.6–10.4)
CHLORIDE SERPL-SCNC: 103 MMOL/L (ref 98–110)
CHOLEST SERPL-MCNC: 171 MG/DL
CHOLEST/HDLC SERPL: 3.4 (CALC)
CO2 SERPL-SCNC: 22 MMOL/L (ref 20–32)
CREAT SERPL-MCNC: 0.99 MG/DL (ref 0.6–0.95)
EGFRCR SERPLBLD CKD-EPI 2021: 56 ML/MIN/1.73M2
EOSINOPHIL # BLD AUTO: 120 CELLS/UL (ref 15–500)
EOSINOPHIL NFR BLD AUTO: 2.4 %
ERYTHROCYTE [DISTWIDTH] IN BLOOD BY AUTOMATED COUNT: 13 % (ref 11–15)
GLUCOSE SERPL-MCNC: 98 MG/DL (ref 65–99)
HCT VFR BLD AUTO: 44.3 % (ref 35–45)
HDLC SERPL-MCNC: 50 MG/DL
HGB BLD-MCNC: 14.4 G/DL (ref 11.7–15.5)
IRON SATN MFR SERPL: 38 % (CALC) (ref 16–45)
IRON SERPL-MCNC: 120 MCG/DL (ref 45–160)
LDLC SERPL CALC-MCNC: 100 MG/DL (CALC)
LYMPHOCYTES # BLD AUTO: 1485 CELLS/UL (ref 850–3900)
LYMPHOCYTES NFR BLD AUTO: 29.7 %
MCH RBC QN AUTO: 30.3 PG (ref 27–33)
MCHC RBC AUTO-ENTMCNC: 32.5 G/DL (ref 32–36)
MCV RBC AUTO: 93.1 FL (ref 80–100)
MONOCYTES # BLD AUTO: 595 CELLS/UL (ref 200–950)
MONOCYTES NFR BLD AUTO: 11.9 %
NEUTROPHILS # BLD AUTO: 2760 CELLS/UL (ref 1500–7800)
NEUTROPHILS NFR BLD AUTO: 55.2 %
NONHDLC SERPL-MCNC: 121 MG/DL (CALC)
PLATELET # BLD AUTO: 181 THOUSAND/UL (ref 140–400)
PMV BLD REES-ECKER: 10.9 FL (ref 7.5–12.5)
POTASSIUM SERPL-SCNC: 4.9 MMOL/L (ref 3.5–5.3)
PROT SERPL-MCNC: 7.2 G/DL (ref 6.1–8.1)
RBC # BLD AUTO: 4.76 MILLION/UL (ref 3.8–5.1)
SODIUM SERPL-SCNC: 136 MMOL/L (ref 135–146)
T4 FREE SERPL-MCNC: 1.3 NG/DL (ref 0.8–1.8)
TIBC SERPL-MCNC: 318 MCG/DL (CALC) (ref 250–450)
TRIGL SERPL-MCNC: 112 MG/DL
TSH SERPL-ACNC: 5.23 MIU/L (ref 0.4–4.5)
WBC # BLD AUTO: 5 THOUSAND/UL (ref 3.8–10.8)

## 2025-02-27 ENCOUNTER — TELEPHONE (OUTPATIENT)
Dept: PRIMARY CARE | Facility: CLINIC | Age: 85
End: 2025-02-27
Payer: MEDICARE

## 2025-07-08 ENCOUNTER — OFFICE VISIT (OUTPATIENT)
Dept: PRIMARY CARE | Facility: CLINIC | Age: 85
End: 2025-07-08
Payer: MEDICARE

## 2025-07-08 VITALS
OXYGEN SATURATION: 98 % | BODY MASS INDEX: 25.96 KG/M2 | DIASTOLIC BLOOD PRESSURE: 80 MMHG | SYSTOLIC BLOOD PRESSURE: 116 MMHG | HEART RATE: 76 BPM | WEIGHT: 156 LBS

## 2025-07-08 DIAGNOSIS — I10 PRIMARY HYPERTENSION: ICD-10-CM

## 2025-07-08 DIAGNOSIS — R79.9 ABNORMAL FINDING OF BLOOD CHEMISTRY, UNSPECIFIED: ICD-10-CM

## 2025-07-08 DIAGNOSIS — E55.9 VITAMIN D DEFICIENCY: ICD-10-CM

## 2025-07-08 DIAGNOSIS — Z11.59 SCREENING FOR VIRAL DISEASE: ICD-10-CM

## 2025-07-08 DIAGNOSIS — Z00.00 WELLNESS EXAMINATION: ICD-10-CM

## 2025-07-08 DIAGNOSIS — E78.2 MIXED HYPERLIPIDEMIA: ICD-10-CM

## 2025-07-08 DIAGNOSIS — Z13.6 ENCOUNTER FOR SCREENING FOR CARDIOVASCULAR DISORDERS: ICD-10-CM

## 2025-07-08 DIAGNOSIS — Z13.29 SCREENING FOR THYROID DISORDER: ICD-10-CM

## 2025-07-08 DIAGNOSIS — Z13.1 SCREENING FOR DIABETES MELLITUS: ICD-10-CM

## 2025-07-08 DIAGNOSIS — E03.9 ACQUIRED HYPOTHYROIDISM: ICD-10-CM

## 2025-07-08 DIAGNOSIS — M76.31 IT BAND SYNDROME, RIGHT: ICD-10-CM

## 2025-07-08 DIAGNOSIS — Z00.00 MEDICARE ANNUAL WELLNESS VISIT, SUBSEQUENT: Primary | ICD-10-CM

## 2025-07-08 PROCEDURE — 99215 OFFICE O/P EST HI 40 MIN: CPT | Performed by: FAMILY MEDICINE

## 2025-07-08 PROCEDURE — G0439 PPPS, SUBSEQ VISIT: HCPCS | Performed by: FAMILY MEDICINE

## 2025-07-08 PROCEDURE — 99397 PER PM REEVAL EST PAT 65+ YR: CPT | Performed by: FAMILY MEDICINE

## 2025-07-08 PROCEDURE — G0446 INTENS BEHAVE THER CARDIO DX: HCPCS | Performed by: FAMILY MEDICINE

## 2025-07-08 PROCEDURE — G0444 DEPRESSION SCREEN ANNUAL: HCPCS | Performed by: FAMILY MEDICINE

## 2025-07-08 PROCEDURE — 3074F SYST BP LT 130 MM HG: CPT | Performed by: FAMILY MEDICINE

## 2025-07-08 PROCEDURE — 1125F AMNT PAIN NOTED PAIN PRSNT: CPT | Performed by: FAMILY MEDICINE

## 2025-07-08 PROCEDURE — 1170F FXNL STATUS ASSESSED: CPT | Performed by: FAMILY MEDICINE

## 2025-07-08 PROCEDURE — 3079F DIAST BP 80-89 MM HG: CPT | Performed by: FAMILY MEDICINE

## 2025-07-08 PROCEDURE — 99214 OFFICE O/P EST MOD 30 MIN: CPT | Performed by: FAMILY MEDICINE

## 2025-07-08 PROCEDURE — 1159F MED LIST DOCD IN RCRD: CPT | Performed by: FAMILY MEDICINE

## 2025-07-08 PROCEDURE — 1036F TOBACCO NON-USER: CPT | Performed by: FAMILY MEDICINE

## 2025-07-08 PROCEDURE — 99214 OFFICE O/P EST MOD 30 MIN: CPT | Mod: 25 | Performed by: FAMILY MEDICINE

## 2025-07-08 RX ORDER — LISINOPRIL 10 MG/1
10 TABLET ORAL DAILY
Qty: 90 TABLET | Refills: 1 | Status: SHIPPED | OUTPATIENT
Start: 2025-07-08 | End: 2026-01-04

## 2025-07-08 RX ORDER — SIMVASTATIN 40 MG/1
40 TABLET, FILM COATED ORAL NIGHTLY
Qty: 90 TABLET | Refills: 1 | Status: SHIPPED | OUTPATIENT
Start: 2025-07-08

## 2025-07-08 RX ORDER — LEVOTHYROXINE SODIUM 112 UG/1
112 TABLET ORAL
Qty: 90 TABLET | Refills: 0 | Status: SHIPPED | OUTPATIENT
Start: 2025-07-08 | End: 2025-10-06

## 2025-07-08 ASSESSMENT — ENCOUNTER SYMPTOMS
DEPRESSION: 0
LOSS OF SENSATION IN FEET: 0
OCCASIONAL FEELINGS OF UNSTEADINESS: 0

## 2025-07-08 ASSESSMENT — ACTIVITIES OF DAILY LIVING (ADL)
TAKING_MEDICATION: INDEPENDENT
MANAGING FINANCES: INDEPENDENT
USING TRANSPORTATION: INDEPENDENT
STIL DRIVING: YES
PREPARING MEALS: INDEPENDENT
PILL BOX USED: NO
MANAGING_FINANCES: INDEPENDENT
USING TELEPHONE: INDEPENDENT
DRESSING: INDEPENDENT
GROCERY_SHOPPING: INDEPENDENT
DRESSING: INDEPENDENT
BATHING: INDEPENDENT
DOING_HOUSEWORK: INDEPENDENT
EATING: INDEPENDENT
GROCERY SHOPPING: INDEPENDENT
TAKING MEDICATION: INDEPENDENT
FEEDING: INDEPENDENT
BATHING: INDEPENDENT
JUDGMENT_ADEQUATE_SAFELY_COMPLETE_DAILY_ACTIVITIES: YES
TOILETING: INDEPENDENT
DOING HOUSEWORK: INDEPENDENT
ADEQUATE_TO_COMPLETE_ADL: YES
NEEDS ASSISTANCE WITH FOOD: INDEPENDENT

## 2025-07-08 ASSESSMENT — COGNITIVE AND FUNCTIONAL STATUS - GENERAL
VERBAL FLUENCY - ANIMAL NAMES (0 TO 25): 3
TRAIL MAKING TEST: PATIENT COMPLETES TRAIL MAKING TEST PROPERLY.

## 2025-07-08 ASSESSMENT — PATIENT HEALTH QUESTIONNAIRE - PHQ9
SUM OF ALL RESPONSES TO PHQ9 QUESTIONS 1 AND 2: 0
2. FEELING DOWN, DEPRESSED OR HOPELESS: NOT AT ALL
1. LITTLE INTEREST OR PLEASURE IN DOING THINGS: NOT AT ALL

## 2025-07-08 ASSESSMENT — PAIN SCALES - GENERAL: PAINLEVEL_OUTOF10: 8

## 2025-07-08 NOTE — PROGRESS NOTES
85-year presents to establish care for yearly physical Medicare and wellness visit follow-up chronic medical conditions    Health Maintenance:  Eats a varied and healthy diet.  Exercises regularly.  Does not drink, smoke, use illicit substances.  Due for bone density screening and Otherwise up-to-date on all routine health maintenance screenings.  Due for immunizations.  Due for yearly lab work.    Hypothyroidism:  Last TSH outside of goal range currently on 100 mcg levothyroxine does feel fatigued and difficulty losing weight    Hyperlipidemia: Currently on minor intensity statin therapy last LDL at goal    Hypertension: On lisinopril blood pressure 160/80 today    Right leg pain: Has been experiencing tightness and pain on the lateral aspect of her leg down from her hip to her knee no recent trauma or injury    All pertinent positive symptoms are included in history of present illness.    All other systems have been reviewed and are negative and noncontributory to this patient's current ailments.      CONSTITUTIONAL - INAD. Not ill appearing.  SKIN - No lesions or rashes visualized. Good skin turgor.  HEENT- Head is atraumatic and normocephalic. Nasal turbinates are nonerythematous and without drainage. .  RESP - CTAB. No wheezing, rhonchi, or crackles.   CARDIAC - RRR.  2 out of 6 systolic murmur, gallops, or rubs.  ABDOMEN - Soft, nontender, nondistended. No organomegaly.  NEURO - CNs 2-12 grossly intact.  PSYCH - Normal mood and affect  MUSCULOSKELETAL-mild tenderness to palpation along the IT band in the right leg    1. Medicare annual wellness visit, subsequent (Primary)  Cardiovascular disease discussion was had including discussions of chronic medical conditions such as hypertension, hyperlipidemia, CAD, or others. 15 minutes was spent in discussion.  and Depression screening was completed. 5 minutes was spent in this discussion.  Health and wellness topics discussed today.  Recommended eating a varied and  healthy diet and made dietary recommendations, also discussed exercise and exercising regularly 150 minutes a week.  Immunizations recommended and updated.  Health screening guidelines discussed with patient including possible recommendations such as colon cancer screening, breast cancer screening, prostate cancer screening, lung cancer screening, bone densitometry, AAA screening, and other wellness topics.  Yearly lab work ordered/reviewed today.    - CBC and Auto Differential; Future  - Comprehensive Metabolic Panel; Future  - Lipid Panel; Future  - Hemoglobin A1C; Future  - TSH with reflex to Free T4 if abnormal; Future  - Vitamin D 25-Hydroxy,Total (for eval of Vitamin D levels); Future    2. Mixed hyperlipidemia  Discussed with patient about the natural history and course of hyperlipidemia.      Recommend lifestyle interventions including lower carbohydrate, lower saturated fat diet as well as increasing aerobic and resistance training exercise to at least 30 minutes daily 3 times a week, hopefully more.    Discussed medication options including the use of statin medications as well as the side effects of these medications.  Discussed goal LDL of <100 for primary prevention and <70 for secondary prevention.    Discussed the cost benefit analysis of lowering cholesterol and how it will help prevent heart attacks and strokes in the future.     Discussed secondary risk stratification with CT Cardiac Scoring and utilizing the PREVENT calculator to determine if statin use will benefit patient on an individual basis, and may have discussed an informed shared decision to de-prescribe statin if patient may not be likely benefitting from one.     If medication was prescribed or continued, the appropriate lab work was ordered for monitoring.  - simvastatin (Zocor) 40 mg tablet; Take 1 tablet (40 mg) by mouth once daily at bedtime.  Dispense: 90 tablet; Refill: 1  - CBC and Auto Differential; Future  - Comprehensive  Metabolic Panel; Future  - Lipid Panel; Future  - Hemoglobin A1C; Future  - TSH with reflex to Free T4 if abnormal; Future  - Vitamin D 25-Hydroxy,Total (for eval of Vitamin D levels); Future    3. Primary hypertension  Had a discussion with the patient about hypertension.  Discussed the natural history and course of hypertension and need for controlling blood pressure.  Discussed the cost benefit of treating hypertension with medication and how lowering blood pressure to goal levels will help reduce the risk of heart attacks and strokes in the future.     I recommend lifestyle interventions including regular cardio aerobic exercise 30 minutes daily at least 3 times a week, hopefully more.  Recommend dietary interventions to help lower blood pressure.    Recommend patient get a blood pressure cuff and begin measuring blood pressure at home and keeping a log.  Advised the patient bring the log with them at their next visit so that we can find out the average blood pressure reading and determine whether or not the treatment is working.    If patient is already taking medication, I recommended continuing or changing medication depending on blood pressure control.    Appropriate lab work was ordered as needed for appropriate monitoring.  - lisinopril 10 mg tablet; Take 1 tablet (10 mg) by mouth once daily.  Dispense: 90 tablet; Refill: 1  - CBC and Auto Differential; Future  - Comprehensive Metabolic Panel; Future  - Lipid Panel; Future  - Hemoglobin A1C; Future  - TSH with reflex to Free T4 if abnormal; Future  - Vitamin D 25-Hydroxy,Total (for eval of Vitamin D levels); Future    4. Acquired hypothyroidism  Adjust levothyroxine to 112 mcg follow-up in 3 months    The natural history and course of hypothyroidism was discussed at length with the patient.     Spoke about different symptoms look out for for hyper or hypothyroid symptoms including weight loss or gain, heat or cold intolerance, constipation/diarrhea, or  sensations of palpitations/fatigue.    Spoke with the different treatment options with regard to hypothyroidism.    Spoke with the need to routinely monitor TSH and the patient to assess efficacy of treatment.    I would like to see the patient every 6 months to recheck TSH for medication adjustment.    Routine lab work was ordered today, medication was ordered and/or adjusted today.  - levothyroxine (Synthroid, Levoxyl) 112 mcg tablet; Take 1 tablet (112 mcg) by mouth once daily in the morning. Take before meals.  Dispense: 90 tablet; Refill: 0  - CBC and Auto Differential; Future  - Comprehensive Metabolic Panel; Future  - Lipid Panel; Future  - Hemoglobin A1C; Future  - TSH with reflex to Free T4 if abnormal; Future  - Vitamin D 25-Hydroxy,Total (for eval of Vitamin D levels); Future    5. Vitamin D deficiency    - CBC and Auto Differential; Future  - Comprehensive Metabolic Panel; Future  - Lipid Panel; Future  - Hemoglobin A1C; Future  - TSH with reflex to Free T4 if abnormal; Future  - Vitamin D 25-Hydroxy,Total (for eval of Vitamin D levels); Future    6. It band syndrome, right  Likely IT band syndrome trial physical therapy  - Referral to Physical Therapy; Future  - CBC and Auto Differential; Future  - Comprehensive Metabolic Panel; Future  - Lipid Panel; Future  - Hemoglobin A1C; Future  - TSH with reflex to Free T4 if abnormal; Future  - Vitamin D 25-Hydroxy,Total (for eval of Vitamin D levels); Future    7. Abnormal finding of blood chemistry, unspecified    - CBC and Auto Differential; Future  - Comprehensive Metabolic Panel; Future  - Lipid Panel; Future  - Hemoglobin A1C; Future  - TSH with reflex to Free T4 if abnormal; Future  - Vitamin D 25-Hydroxy,Total (for eval of Vitamin D levels); Future    8. Screening for thyroid disorder  - CBC and Auto Differential; Future  - Comprehensive Metabolic Panel; Future  - Lipid Panel; Future  - Hemoglobin A1C; Future  - TSH with reflex to Free T4 if abnormal;  Future  - Vitamin D 25-Hydroxy,Total (for eval of Vitamin D levels); Future    9. Screening for diabetes mellitus    - CBC and Auto Differential; Future  - Comprehensive Metabolic Panel; Future  - Lipid Panel; Future  - Hemoglobin A1C; Future  - TSH with reflex to Free T4 if abnormal; Future  - Vitamin D 25-Hydroxy,Total (for eval of Vitamin D levels); Future    10. Screening for viral disease    - CBC and Auto Differential; Future  - Comprehensive Metabolic Panel; Future  - Lipid Panel; Future  - Hemoglobin A1C; Future  - TSH with reflex to Free T4 if abnormal; Future  - Vitamin D 25-Hydroxy,Total (for eval of Vitamin D levels); Future    11. Encounter for screening for cardiovascular disorders    - CBC and Auto Differential; Future  - Comprehensive Metabolic Panel; Future  - Lipid Panel; Future  - Hemoglobin A1C; Future  - TSH with reflex to Free T4 if abnormal; Future  - Vitamin D 25-Hydroxy,Total (for eval of Vitamin D levels); Future    12. Wellness examination  Health and wellness topics discussed today.  Recommended eating a varied and healthy diet and made dietary recommendations, also discussed exercise and exercising regularly 150 minutes a week.  Immunizations recommended and updated.  Health screening guidelines discussed with patient including possible recommendations such as colon cancer screening, breast cancer screening, prostate cancer screening, lung cancer screening, bone densitometry, AAA screening, and other wellness topics.  Yearly lab work ordered/reviewed today.

## 2025-07-09 ENCOUNTER — TELEPHONE (OUTPATIENT)
Dept: PRIMARY CARE | Facility: CLINIC | Age: 85
End: 2025-07-09
Payer: MEDICARE

## 2025-07-09 NOTE — TELEPHONE ENCOUNTER
Pt called stating she gets a dry spot in her throat which makes her cough a lot and has a lot of phlegm that she can't get out. Pt asking if there is something over the counter pcp recommends. Please advise.     Last seen: 7/8/25  Next appt: 10/21/25

## 2025-07-10 NOTE — TELEPHONE ENCOUNTER
If patient is experiencing a productive cough I would like to be evaluated as I cannot be sure what the etiology could be and hesitate to make recommendations until I know what could be going on

## 2025-07-14 ENCOUNTER — APPOINTMENT (OUTPATIENT)
Dept: PRIMARY CARE | Facility: CLINIC | Age: 85
End: 2025-07-14
Payer: MEDICARE

## 2025-08-25 ENCOUNTER — TELEPHONE (OUTPATIENT)
Dept: PRIMARY CARE | Facility: CLINIC | Age: 85
End: 2025-08-25
Payer: MEDICARE

## 2025-08-25 DIAGNOSIS — I67.9 CVD (CEREBROVASCULAR DISEASE): Primary | ICD-10-CM

## 2025-08-26 RX ORDER — CLOPIDOGREL BISULFATE 75 MG/1
75 TABLET ORAL DAILY
Qty: 90 TABLET | Refills: 3 | Status: SHIPPED | OUTPATIENT
Start: 2025-08-26 | End: 2026-08-26

## (undated) DEVICE — DRESSING, MEDIPORE W/PAD, 3-1/2X4 IN

## (undated) DEVICE — SOLUTION, IRRIGATION, X RX SODIUM CHL 0.9%, 1000ML BTL

## (undated) DEVICE — ADHESIVE, SKIN, DERMABOND ADVANCED, 15CM, PEN-STYLE

## (undated) DEVICE — DRESSING, MEPILEX BORDER, POST-OP AG, 4 X 10 IN

## (undated) DEVICE — GLOVE, PROTEXIS PI CLASSIC, SZ-7.5, PF, LF

## (undated) DEVICE — SUTURE, VICRYL, 2-0, 36 IN, CT-1, UNDYED

## (undated) DEVICE — BLADE, SAW, SAGITTAL, 18.0 X 1.27 X 90MM

## (undated) DEVICE — SUTURE, ETHIBOND, P2, V-37, 30 IN, GREEN

## (undated) DEVICE — IRRIGATION SYSTEM, WOUND, SURGIPHOR, 450ML, STERILE

## (undated) DEVICE — Device

## (undated) DEVICE — SUTURE, VICRYL, 0, 36 IN, CT-1, UNDYED

## (undated) DEVICE — HANDPIECE, INTERPULSE, W/RETRACTABLE COAX FAN, STERILE

## (undated) DEVICE — CEMENT, MIXEVAC III, 10S BOWL, KNEES

## (undated) DEVICE — GLOVE, SURGICAL, PROTEXIS PI BLUE W/NEUTHERA, 8.0, PF, LF

## (undated) DEVICE — TIP, SUCTION, FRAZIER, W/CONTROL VENT, 12 FR

## (undated) DEVICE — SUTURE, MONOCRYL, 4-0, 27 IN, PS-2, UNDYED

## (undated) DEVICE — SOLUTION, IRRIGATION, USP, SODIUM CHLORIDE 0.9%, 3000 ML, BAG

## (undated) DEVICE — CATHETER TRAY, URETHRAL, FOLEY, 16 FR, SILICONE

## (undated) DEVICE — RESERVOIR, WOUND, W/TROCAR, PVC, MEDIUM, 400CC, DAVOL, 1/8 IN, 10FR

## (undated) DEVICE — CUFF, TOURNIQUET, 30 X 4, DUAL PORT/SNGL BLADDER, DISP, LF

## (undated) DEVICE — FACE SHIELD, OPTI-COM